# Patient Record
Sex: MALE | Race: WHITE | NOT HISPANIC OR LATINO | Employment: FULL TIME | URBAN - METROPOLITAN AREA
[De-identification: names, ages, dates, MRNs, and addresses within clinical notes are randomized per-mention and may not be internally consistent; named-entity substitution may affect disease eponyms.]

---

## 2017-01-03 RX ORDER — CITALOPRAM 20 MG/1
TABLET ORAL
Qty: 90 TAB | Refills: 0 | Status: SHIPPED | OUTPATIENT
Start: 2017-01-03 | End: 2017-05-06 | Stop reason: SDUPTHER

## 2017-01-03 NOTE — TELEPHONE ENCOUNTER
Was the patient seen in the last year in this department? Yes     Does patient have an active prescription for medications requested? No     Received Request Via: Pharmacy      Pt met protocol?: Yes    LAST OV 10/20/16

## 2017-03-07 RX ORDER — LISINOPRIL AND HYDROCHLOROTHIAZIDE 20; 12.5 MG/1; MG/1
TABLET ORAL
Qty: 180 TAB | Refills: 0 | Status: SHIPPED | OUTPATIENT
Start: 2017-03-07 | End: 2017-10-16

## 2017-03-07 NOTE — TELEPHONE ENCOUNTER
Was the patient seen in the last year in this department? Yes     Does patient have an active prescription for medications requested? No     Received Request Via: Pharmacy      Pt met protocol?: Yes, LABS 5/16, OV /88

## 2017-05-08 RX ORDER — CITALOPRAM 20 MG/1
TABLET ORAL
Qty: 90 TAB | Refills: 0 | Status: SHIPPED | OUTPATIENT
Start: 2017-05-08 | End: 2017-08-19 | Stop reason: SDUPTHER

## 2017-05-15 ENCOUNTER — OFFICE VISIT (OUTPATIENT)
Dept: URGENT CARE | Facility: PHYSICIAN GROUP | Age: 52
End: 2017-05-15
Payer: COMMERCIAL

## 2017-05-15 VITALS
DIASTOLIC BLOOD PRESSURE: 62 MMHG | TEMPERATURE: 97.5 F | RESPIRATION RATE: 18 BRPM | HEART RATE: 84 BPM | BODY MASS INDEX: 36.01 KG/M2 | SYSTOLIC BLOOD PRESSURE: 126 MMHG | WEIGHT: 230 LBS | OXYGEN SATURATION: 98 %

## 2017-05-15 DIAGNOSIS — J98.8 RTI (RESPIRATORY TRACT INFECTION): ICD-10-CM

## 2017-05-15 DIAGNOSIS — I10 ESSENTIAL HYPERTENSION: ICD-10-CM

## 2017-05-15 DIAGNOSIS — J01.90 ACUTE RHINOSINUSITIS: Primary | ICD-10-CM

## 2017-05-15 PROCEDURE — 99214 OFFICE O/P EST MOD 30 MIN: CPT | Performed by: FAMILY MEDICINE

## 2017-05-15 RX ORDER — FLUTICASONE PROPIONATE 50 MCG
2 SPRAY, SUSPENSION (ML) NASAL
Qty: 1 BOTTLE | Refills: 1 | Status: SHIPPED | OUTPATIENT
Start: 2017-05-15 | End: 2017-07-06

## 2017-05-15 RX ORDER — PREDNISONE 20 MG/1
40 TABLET ORAL EVERY MORNING
Qty: 12 TAB | Refills: 0 | Status: SHIPPED | OUTPATIENT
Start: 2017-05-15 | End: 2017-05-21

## 2017-05-15 RX ORDER — AMOXICILLIN AND CLAVULANATE POTASSIUM 875; 125 MG/1; MG/1
1 TABLET, FILM COATED ORAL 2 TIMES DAILY
Qty: 14 TAB | Refills: 0 | Status: SHIPPED | OUTPATIENT
Start: 2017-05-15 | End: 2017-05-22

## 2017-05-15 ASSESSMENT — ENCOUNTER SYMPTOMS
SPUTUM PRODUCTION: 1
FEVER: 0
COUGH: 1
DIZZINESS: 0
HEADACHES: 1
CHILLS: 0
FOCAL WEAKNESS: 0
SORE THROAT: 1

## 2017-05-15 NOTE — PROGRESS NOTES
Subjective:      Lawrence Garrett is a 52 y.o. male who presents with URI    Chief Complaint   Patient presents with   • URI     x 10 days        - This is a very pleasant 52 y.o. male with complaints of 10 days cough and sinus congestion w/ yellow DC      - Hx HTN, stable on current meds          ALLERGIES:  Review of patient's allergies indicates no known allergies.     PMH:  Past Medical History   Diagnosis Date   • Hypertension    • Hyperlipidemia    • Anxiety    • HTN (hypertension) 11/17/2010   • Elevated liver function tests 11/17/2010   • Dyslipidemia 11/17/2010        MEDS:    Current outpatient prescriptions:   •  amoxicillin-clavulanate (AUGMENTIN) 875-125 MG Tab, Take 1 Tab by mouth 2 times a day for 7 days., Disp: 14 Tab, Rfl: 0  •  predniSONE (DELTASONE) 20 MG Tab, Take 2 Tabs by mouth every morning for 6 days., Disp: 12 Tab, Rfl: 0  •  fluticasone (FLONASE) 50 MCG/ACT nasal spray, Spray 2 Sprays in nose every bedtime., Disp: 1 Bottle, Rfl: 1  •  Hydrocod Polst-CPM Polst ER (TUSSIONEX) 10-8 MG/5ML Suspension Extended Release, Take 5 mL by mouth every 12 hours as needed for Cough., Disp: 120 mL, Rfl: 0  •  citalopram (CELEXA) 20 MG Tab, TAKE 1 TABLET BY MOUTH EVERY DAY, Disp: 90 Tab, Rfl: 0  •  lisinopril-hydrochlorothiazide (PRINZIDE, ZESTORETIC) 20-12.5 MG per tablet, TAKE 1 TAB BY MOUTH 2 TIMES A DAY., Disp: 180 Tab, Rfl: 0  •  ibuprofen (MOTRIN) 800 MG Tab, Take 1 Tab by mouth every 8 hours as needed. For mild pain, Disp: 30 Tab, Rfl: 0  •  aspirin (ASA) 325 MG TABS, Take 325 mg by mouth every day., Disp: , Rfl:   •  docosahexanoic acid (FISH OIL) 1000 MG CAPS, Take 1,000 mg by mouth 2 Times a Day., Disp: , Rfl:   •  vitamin D (CHOLECALCIFEROL) 1000 UNIT TABS, Take 1,000 Units by mouth every day., Disp: , Rfl:   •  atorvastatin (LIPITOR) 40 MG Tab, Take 1 Tab by mouth every day., Disp: 90 Tab, Rfl: 3  •  clotrimazole-betamethasone (LOTRISONE) 1-0.05 % Cream, Apply 1 Application to affected area(s)  2 times a day., Disp: 1 Tube, Rfl: 3  •  meclizine (ANTIVERT) 25 MG TABS, Take 25 mg by mouth as needed., Disp: , Rfl:   •  fluticasone (FLONASE) 50 MCG/ACT nasal spray, Spray 2 Sprays in nose every day., Disp: 16 g, Rfl: 11  •  methocarbamol (ROBAXIN-750) 750 MG TABS, Take 1 Tab by mouth 3 times a day., Disp: 90 Tab, Rfl: 0    ** Past medical, social, family and surgical history documented in HPI or under PMH/PSH/FH section, otherwise it is contributory **             HPI    Review of Systems   Constitutional: Negative for fever and chills.   HENT: Positive for congestion and sore throat.    Respiratory: Positive for cough and sputum production.    Cardiovascular: Negative for chest pain.   Neurological: Positive for headaches. Negative for dizziness and focal weakness.          Objective:     /62 mmHg  Pulse 84  Temp(Src) 36.4 °C (97.5 °F)  Resp 18  Wt 104.327 kg (230 lb)  SpO2 98%     Physical Exam   Constitutional: He appears well-developed. No distress.   HENT:   Head: Normocephalic and atraumatic.   Mouth/Throat: Oropharynx is clear and moist.   Eyes: Conjunctivae are normal.   Neck: Neck supple.   Cardiovascular: Regular rhythm.    No murmur heard.  Pulmonary/Chest: Effort normal and breath sounds normal.   Neurological: He is alert. He exhibits normal muscle tone.   Skin: Skin is warm and dry.   Psychiatric: He has a normal mood and affect. Judgment normal.   Nursing note and vitals reviewed.  boggy nasal mucosa w/ yellow DC              Assessment/Plan:         1. Acute rhinosinusitis  amoxicillin-clavulanate (AUGMENTIN) 875-125 MG Tab    predniSONE (DELTASONE) 20 MG Tab    fluticasone (FLONASE) 50 MCG/ACT nasal spray   2. RTI (respiratory tract infection)  Hydrocod Polst-CPM Polst ER (TUSSIONEX) 10-8 MG/5ML Suspension Extended Release             Dx & d/c instructions discussed w/ patient and/or family members. Follow up w/ Prvt Dr or here in 3-4 days if not getting better, sooner if needed,  ER  if worse and UC/PCP unavailable.        Possible side effects (i.e. Rash, GI upset/constipation, sedation, elevation of BP or sugars) of any medications given discussed.

## 2017-05-15 NOTE — MR AVS SNAPSHOT
Lawrence Gillespie Gerry   5/15/2017 12:15 PM   Office Visit   MRN: [id number]    Department:  [facility]   Dept Phone:  [phone]    Description:  Male : 1965   Provider:  Barrington Morales M.D.           Reason for Visit     URI x 10 days      Allergies as of 5/15/2017     No Known Allergies      You were diagnosed with     Acute rhinosinusitis   [358212]  -  Primary     RTI (respiratory tract infection)   [996327]       Essential hypertension   [8796322]         Vital Signs     Blood Pressure Pulse Temperature Respirations Weight Oxygen Saturation    126/62 mmHg 84 36.4 °C (97.5 °F) 18 104.327 kg (230 lb) 98%    Smoking Status                   Never Smoker            Basic Information     Date Of Birth Sex Race Ethnicity Preferred Language    1965 Male White Non- English      Problem List              ICD-10-CM Priority Class Noted - Resolved    HTN (hypertension) I10   2010 - Present    Anxiety F41.9   2010 - Present    Dyslipidemia E78.5   2010 - Present    Palpitations R00.2   2014 - Present    Sea sickness T75.3XXA   2014 - Present    Rupture of right gastrocnemius tendon S86.811A   2015 - Present    Depression F32.9   2016 - Present      Health Maintenance        Date Due Completion Dates    COLONOSCOPY 2023 (Done)    Override on 2013: Done    IMM DTaP/Tdap/Td Vaccine (2 - Td) 2026            Current Immunizations     Influenza Vaccine Quad Inj (Pf) 2016    Tdap Vaccine 2016      Below and/or attached are the medications your provider expects you to take. Review all of your home medications and newly ordered medications with your provider and/or pharmacist. Follow medication instructions as directed by your provider and/or pharmacist. Please keep your medication list with you and share with your provider. Update the information when medications are discontinued, doses are changed, or new  medications (including over-the-counter products) are added; and carry medication information at all times in the event of emergency situations     Allergies:  No Known Allergies          Medications  Valid as of: May 15, 2017 - 12:35 PM    Generic Name Brand Name Tablet Size Instructions for use    Amoxicillin-Pot Clavulanate (Tab) AUGMENTIN 875-125 MG Take 1 Tab by mouth 2 times a day for 7 days.        Aspirin (Tab)  MG Take 325 mg by mouth every day.        Atorvastatin Calcium (Tab) LIPITOR 40 MG Take 1 Tab by mouth every day.        Cholecalciferol (Tab) cholecalciferol 1000 UNIT Take 1,000 Units by mouth every day.        Citalopram Hydrobromide (Tab) CELEXA 20 MG TAKE 1 TABLET BY MOUTH EVERY DAY        Clotrimazole-Betamethasone (Cream) LOTRISONE 1-0.05 % Apply 1 Application to affected area(s) 2 times a day.        Fluticasone Propionate (Suspension) FLONASE 50 MCG/ACT Spray 2 Sprays in nose every day.        Fluticasone Propionate (Suspension) FLONASE 50 MCG/ACT Spray 2 Sprays in nose every bedtime.        Hydrocod Polst-Chlorphen Polst (Suspension Extended Release) TUSSIONEX 10-8 MG/5ML Take 5 mL by mouth every 12 hours as needed for Cough.        Ibuprofen (Tab) MOTRIN 800 MG Take 1 Tab by mouth every 8 hours as needed. For mild pain        Lisinopril-Hydrochlorothiazide (Tab) PRINZIDE, ZESTORETIC 20-12.5 MG TAKE 1 TAB BY MOUTH 2 TIMES A DAY.        Meclizine HCl (Tab) ANTIVERT 25 MG Take 25 mg by mouth as needed.        Methocarbamol (Tab) ROBAXIN 750 MG Take 1 Tab by mouth 3 times a day.        Omega-3 Fatty Acids (Cap) OMEGA 3 FA 1000 MG Take 1,000 mg by mouth 2 Times a Day.        PredniSONE (Tab) DELTASONE 20 MG Take 2 Tabs by mouth every morning for 6 days.        .                 Medicines prescribed today were sent to:     [facility] #[id number] - [place], [place] - [address]    [address] [address]    Phone: [phone] Fax: [phone]    Open 24 Hours?: No       Medication refill instructions:       If your prescription bottle indicates you have medication refills left, it is not necessary to call your provider’s office. Please contact your pharmacy and they will refill your medication.    If your prescription bottle indicates you do not have any refills left, you may request refills at any time through one of the following ways: The online [employer] [facility] system (except Urgent Care), by calling your provider’s office, or by asking your pharmacy to contact your provider’s office with a refill request. Medication refills are processed only during regular business hours and may not be available until the next business day. Your provider may request additional information or to have a follow-up visit with you prior to refilling your medication.   *Please Note: Medication refills are assigned a new Rx number when refilled electronically. Your pharmacy may indicate that no refills were authorized even though a new prescription for the same medication is available at the pharmacy. Please request the medicine by name with the pharmacy before contacting your provider for a refill.           [employer] [facility] Access Code: Activation code not generated  Current [employer] Status: Active

## 2017-05-25 ENCOUNTER — OFFICE VISIT (OUTPATIENT)
Dept: URGENT CARE | Facility: PHYSICIAN GROUP | Age: 52
End: 2017-05-25
Payer: COMMERCIAL

## 2017-05-25 ENCOUNTER — HOSPITAL ENCOUNTER (OUTPATIENT)
Dept: RADIOLOGY | Facility: MEDICAL CENTER | Age: 52
End: 2017-05-25
Attending: PHYSICIAN ASSISTANT
Payer: COMMERCIAL

## 2017-05-25 VITALS
BODY MASS INDEX: 34.86 KG/M2 | TEMPERATURE: 100 F | HEIGHT: 68 IN | RESPIRATION RATE: 18 BRPM | DIASTOLIC BLOOD PRESSURE: 98 MMHG | WEIGHT: 230 LBS | SYSTOLIC BLOOD PRESSURE: 138 MMHG | HEART RATE: 97 BPM | OXYGEN SATURATION: 92 %

## 2017-05-25 DIAGNOSIS — R05.9 COUGH: ICD-10-CM

## 2017-05-25 DIAGNOSIS — J18.9 PNEUMONIA OF RIGHT LOWER LOBE DUE TO INFECTIOUS ORGANISM: ICD-10-CM

## 2017-05-25 LAB
FLUAV+FLUBV AG SPEC QL IA: NORMAL
INT CON NEG: NEGATIVE
INT CON POS: POSITIVE

## 2017-05-25 PROCEDURE — 87804 INFLUENZA ASSAY W/OPTIC: CPT | Performed by: PHYSICIAN ASSISTANT

## 2017-05-25 PROCEDURE — 71020 DX-CHEST-2 VIEWS: CPT

## 2017-05-25 PROCEDURE — 99214 OFFICE O/P EST MOD 30 MIN: CPT | Mod: 25 | Performed by: PHYSICIAN ASSISTANT

## 2017-05-25 RX ORDER — DOXYCYCLINE HYCLATE 100 MG
100 TABLET ORAL 2 TIMES DAILY
Qty: 20 TAB | Refills: 0 | Status: SHIPPED | OUTPATIENT
Start: 2017-05-25 | End: 2017-06-04

## 2017-05-25 RX ORDER — CEFTRIAXONE 1 G/1
1 INJECTION, POWDER, FOR SOLUTION INTRAMUSCULAR; INTRAVENOUS ONCE
Status: COMPLETED | OUTPATIENT
Start: 2017-05-25 | End: 2017-05-25

## 2017-05-25 RX ADMIN — CEFTRIAXONE 1 G: 1 INJECTION, POWDER, FOR SOLUTION INTRAMUSCULAR; INTRAVENOUS at 20:44

## 2017-05-25 ASSESSMENT — ENCOUNTER SYMPTOMS
ABDOMINAL PAIN: 0
FOCAL WEAKNESS: 0
COUGH: 1
HEADACHES: 0
VOMITING: 0
SORE THROAT: 0
MYALGIAS: 0
WHEEZING: 1
FEVER: 1
RHINORRHEA: 1
CHILLS: 1
TINGLING: 0
DIARRHEA: 0
NAUSEA: 0
DIZZINESS: 0
SPUTUM PRODUCTION: 1
SWEATS: 1
SHORTNESS OF BREATH: 0
SENSORY CHANGE: 0
HEMOPTYSIS: 0

## 2017-05-25 ASSESSMENT — COPD QUESTIONNAIRES: COPD: 0

## 2017-05-25 NOTE — MR AVS SNAPSHOT
"Shahzadjulito Gillespie Gerry   2017 6:30 PM   Office Visit   MRN: [id number]    Department:  [facility]   Dept Phone:  [phone]    Description:  Male : 1965   Provider:  Sarah Valdez PA-C           Reason for Visit     Cold Symptoms cough, congestion x3 weeks, not getting better      Allergies as of 2017     No Known Allergies      You were diagnosed with     Pneumonia of right lower lobe due to infectious organism   [[id number]]         Vital Signs     Blood Pressure Pulse Temperature Respirations Height Weight    138/98 mmHg 97 37.8 °C (100 °F) 18 1.727 m (5' 8\") 104.327 kg (230 lb)    Body Mass Index Oxygen Saturation Smoking Status             34.98 kg/m2 92% Never Smoker          Basic Information     Date Of Birth Sex Race Ethnicity Preferred Language    1965 Male White Non- English      Problem List              ICD-10-CM Priority Class Noted - Resolved    HTN (hypertension) I10   2010 - Present    Anxiety F41.9   2010 - Present    Dyslipidemia E78.5   2010 - Present    Palpitations R00.2   2014 - Present    Sea sickness T75.3XXA   2014 - Present    Rupture of right gastrocnemius tendon S86.811A   2015 - Present    Depression F32.9   2016 - Present      Health Maintenance        Date Due Completion Dates    COLONOSCOPY 2023 (Done)    Override on 2013: Done    IMM DTaP/Tdap/Td Vaccine (2 - Td) 2026            Results     POCT Influenza A/B      Component    Rapid Influenza A-B    neg    Internal Control Positive    Positive    Internal Control Negative    Negative                        Current Immunizations     Influenza Vaccine Quad Inj (Pf) 2016    Tdap Vaccine 2016      Below and/or attached are the medications your provider expects you to take. Review all of your home medications and newly ordered medications with your provider and/or pharmacist. Follow medication instructions as " directed by your provider and/or pharmacist. Please keep your medication list with you and share with your provider. Update the information when medications are discontinued, doses are changed, or new medications (including over-the-counter products) are added; and carry medication information at all times in the event of emergency situations     Allergies:  No Known Allergies          Medications  Valid as of: May 25, 2017 -  8:57 PM    Generic Name Brand Name Tablet Size Instructions for use    Aspirin (Tab)  MG Take 325 mg by mouth every day.        Atorvastatin Calcium (Tab) LIPITOR 40 MG Take 1 Tab by mouth every day.        Cholecalciferol (Tab) cholecalciferol 1000 UNIT Take 1,000 Units by mouth every day.        Citalopram Hydrobromide (Tab) CELEXA 20 MG TAKE 1 TABLET BY MOUTH EVERY DAY        Clotrimazole-Betamethasone (Cream) LOTRISONE 1-0.05 % Apply 1 Application to affected area(s) 2 times a day.        Doxycycline Hyclate (Tab) VIBRAMYCIN 100 MG Take 1 Tab by mouth 2 times a day for 10 days.        Fluticasone Propionate (Suspension) FLONASE 50 MCG/ACT Spray 2 Sprays in nose every day.        Fluticasone Propionate (Suspension) FLONASE 50 MCG/ACT Spray 2 Sprays in nose every bedtime.        Hydrocod Polst-Chlorphen Polst (Suspension Extended Release) TUSSIONEX 10-8 MG/5ML Take 5 mL by mouth every 12 hours as needed for Cough.        Ibuprofen (Tab) MOTRIN 800 MG Take 1 Tab by mouth every 8 hours as needed. For mild pain        Lisinopril-Hydrochlorothiazide (Tab) PRINZIDE, ZESTORETIC 20-12.5 MG TAKE 1 TAB BY MOUTH 2 TIMES A DAY.        Meclizine HCl (Tab) ANTIVERT 25 MG Take 25 mg by mouth as needed.        Methocarbamol (Tab) ROBAXIN 750 MG Take 1 Tab by mouth 3 times a day.        Omega-3 Fatty Acids (Cap) OMEGA 3 FA 1000 MG Take 1,000 mg by mouth 2 Times a Day.        .                 Medicines prescribed today were sent to:     [facility]Y #[id number] - [place], [place] - [address]    [address] [address]    Phone: [phone] Fax: [phone]    Open 24 Hours?: No    [id number] - [place] - [address]    [address] [place] [address] [place] [address]    Phone: [phone] Fax: [phone]    Open 24 Hours?: No      Medication refill instructions:       If your prescription bottle indicates you have medication refills left, it is not necessary to call your provider’s office. Please contact your pharmacy and they will refill your medication.    If your prescription bottle indicates you do not have any refills left, you may request refills at any time through one of the following ways: The online [employer] system (except Urgent Care), by calling your provider’s office, or by asking your pharmacy to contact your provider’s office with a refill request. Medication refills are processed only during regular business hours and may not be available until the next business day. Your provider may request additional information or to have a follow-up visit with you prior to refilling your medication.   *Please Note: Medication refills are assigned a new Rx number when refilled electronically. Your pharmacy may indicate that no refills were authorized even though a new prescription for the same medication is available at the pharmacy. Please request the medicine by name with the pharmacy before contacting your provider for a refill.        Your To Do List     Future Labs/Procedures Complete By Expires    DX-CHEST-2 VIEWS  As directed 5/25/2018         [employer] Access Code: Activation code not generated  Current [employer] Status: Active

## 2017-05-26 NOTE — PROGRESS NOTES
"Subjective:      Lawrence Garrett is a 52 y.o. male who presents with Cold Symptoms            Cough  This is a new problem. Episode onset: 3 weeks  The problem has been unchanged. The cough is productive of sputum. Associated symptoms include chills, ear congestion, a fever, nasal congestion, rhinorrhea, sweats and wheezing. Pertinent negatives include no chest pain, ear pain, headaches, hemoptysis, myalgias, rash, sore throat or shortness of breath. Associated symptoms comments: Sinus pressure . He has tried prescription cough suppressant (Augmentin ) for the symptoms. The treatment provided no relief. There is no history of asthma, bronchitis, COPD or pneumonia.     Past Medical History   Diagnosis Date   • Hypertension    • Hyperlipidemia    • Anxiety    • HTN (hypertension) 11/17/2010   • Elevated liver function tests 11/17/2010   • Dyslipidemia 11/17/2010       Past Surgical History   Procedure Laterality Date   • Vasectomy         Family History   Problem Relation Age of Onset   • Heart Disease Father 70     CHF       No Known Allergies    Medications, Allergies, and current problem list reviewed today in Epic      Review of Systems   Constitutional: Positive for fever, chills and malaise/fatigue.   HENT: Positive for congestion and rhinorrhea. Negative for ear discharge, ear pain and sore throat.         Sinus pressure and rhinorrhea    Respiratory: Positive for cough, sputum production and wheezing. Negative for hemoptysis and shortness of breath.    Cardiovascular: Negative for chest pain and leg swelling.   Gastrointestinal: Negative for nausea, vomiting, abdominal pain and diarrhea.   Musculoskeletal: Negative for myalgias.   Skin: Negative for rash.   Neurological: Negative for dizziness, tingling, sensory change, focal weakness and headaches.     All other systems reviewed and are negative.        Objective:     /98 mmHg  Pulse 97  Temp(Src) 37.8 °C (100 °F)  Resp 18  Ht 1.727 m (5' 8\")  " Wt 104.327 kg (230 lb)  BMI 34.98 kg/m2  SpO2 92%     Physical Exam   Constitutional: He is oriented to person, place, and time. He appears well-developed and well-nourished. No distress.   HENT:   Head: Normocephalic and atraumatic.   Right Ear: Tympanic membrane, external ear and ear canal normal.   Left Ear: Tympanic membrane, external ear and ear canal normal.   Nose: Mucosal edema and rhinorrhea present.   Mouth/Throat: Uvula is midline and oropharynx is clear and moist. No oropharyngeal exudate or posterior oropharyngeal erythema.   Eyes: Conjunctivae are normal.   Neck: Neck supple.   Cardiovascular: Normal rate, regular rhythm and normal heart sounds.  Exam reveals no gallop and no friction rub.    No murmur heard.  Lymphadenopathy:     He has no cervical adenopathy.   Neurological: He is alert and oriented to person, place, and time. No cranial nerve deficit.   Skin: Skin is warm and dry. No rash noted.   Psychiatric: He has a normal mood and affect. His behavior is normal. Judgment and thought content normal.     5/25/2017 7:54 PM    HISTORY/REASON FOR EXAM: Cough.    TECHNIQUE/EXAM DESCRIPTION AND NUMBER OF VIEWS:  Two views of the chest.    COMPARISON:  None.    FINDINGS:    Cardiomediastinal contours are normal.    There is mild patchy right basilar opacity    No pleural space process is evident.         Impression        Mild patchy right basilar opacity could represent pneumonia               Assessment/Plan:     1. Pneumonia of right lower lobe due to infectious organism  DX-CHEST-2 VIEWS    POCT Influenza A/B- negative    doxycycline (VIBRAMYCIN) 100 MG Tab    cefTRIAXone (ROCEPHIN) injection 1 g       Current outpatient prescriptions:   •  doxycycline (VIBRAMYCIN) 100 MG Tab, Take 1 Tab by mouth 2 times a day for 10 days., Disp: 20 Tab, Rfl: 0    Current facility-administered medications:   •  cefTRIAXone (ROCEPHIN) injection 1 g, 1 g, Intramuscular, Once, KEVIN Brennan  fluids, rest, humidification.     Differential diagnoses, Supportive care, and indications for immediate follow-up discussed with patient.   Instructed to return to clinic or nearest emergency department for any change in condition, further concerns, or worsening of symptoms.    The patient demonstrated a good understanding and agreed with the treatment plan.    Sarah Valdez PA-C

## 2017-05-31 ENCOUNTER — OFFICE VISIT (OUTPATIENT)
Dept: MEDICAL GROUP | Facility: PHYSICIAN GROUP | Age: 52
End: 2017-05-31
Payer: COMMERCIAL

## 2017-05-31 VITALS
WEIGHT: 233 LBS | HEIGHT: 68 IN | RESPIRATION RATE: 18 BRPM | DIASTOLIC BLOOD PRESSURE: 90 MMHG | TEMPERATURE: 97.8 F | BODY MASS INDEX: 35.31 KG/M2 | OXYGEN SATURATION: 98 % | SYSTOLIC BLOOD PRESSURE: 130 MMHG | HEART RATE: 80 BPM

## 2017-05-31 DIAGNOSIS — E66.9 OBESITY (BMI 30-39.9): ICD-10-CM

## 2017-05-31 DIAGNOSIS — E78.2 MIXED HYPERTRIGLYCERIDEMIA: ICD-10-CM

## 2017-05-31 DIAGNOSIS — J18.9 CAP (COMMUNITY ACQUIRED PNEUMONIA): Primary | ICD-10-CM

## 2017-05-31 DIAGNOSIS — E78.5 DYSLIPIDEMIA: ICD-10-CM

## 2017-05-31 DIAGNOSIS — Z12.5 SCREENING FOR PROSTATE CANCER: ICD-10-CM

## 2017-05-31 PROCEDURE — 99213 OFFICE O/P EST LOW 20 MIN: CPT | Performed by: NURSE PRACTITIONER

## 2017-05-31 ASSESSMENT — PATIENT HEALTH QUESTIONNAIRE - PHQ9: CLINICAL INTERPRETATION OF PHQ2 SCORE: 0

## 2017-05-31 NOTE — MR AVS SNAPSHOT
"Shahzadmund Jose Miguel Garrett   2017 2:20 PM   Office Visit   MRN: [id number]    Department:  [facility]   Dept Phone:  [phone]    Description:  Male : 1965   Provider:  YOSELIN Thompson           Reason for Visit     Follow-Up Pneumonia       Allergies as of 2017     No Known Allergies      You were diagnosed with     CAP (community acquired pneumonia)   [379271]  -  Primary     Mixed hypertriglyceridemia   [782876]       Dyslipidemia   [527227]       Screening for prostate cancer   [315931]         Vital Signs     Blood Pressure Pulse Temperature Respirations Height Weight    130/90 mmHg 80 36.6 °C (97.8 °F) 18 1.727 m (5' 8\") 105.688 kg (233 lb)    Body Mass Index Oxygen Saturation Smoking Status             35.44 kg/m2 98% Never Smoker          Basic Information     Date Of Birth Sex Race Ethnicity Preferred Language    1965 Male White Non- English      Your appointments     2017  8:20 AM   ANNUAL EXAM PREVENTATIVE with Loraine Hendrix M.D.   [address] [address]   [phone]              Problem List              ICD-10-CM Priority Class Noted - Resolved    HTN (hypertension) I10   2010 - Present    Anxiety F41.9   2010 - Present    Dyslipidemia E78.5   2010 - Present    Palpitations R00.2   2014 - Present    Sea sickness T75.3XXA   2014 - Present    Rupture of right gastrocnemius tendon S86.811A   2015 - Present    Depression F32.9   2016 - Present      Health Maintenance        Date Due Completion Dates    COLONOSCOPY 2023 (Done)    Override on 2013: Done    IMM DTaP/Tdap/Td Vaccine (2 - Td) 2026            Current Immunizations     Influenza Vaccine Quad Inj (Pf) 2016    Tdap Vaccine 2016      Below and/or attached are the medications your provider expects you to take. Review all of your home medications " and newly ordered medications with your provider and/or pharmacist. Follow medication instructions as directed by your provider and/or pharmacist. Please keep your medication list with you and share with your provider. Update the information when medications are discontinued, doses are changed, or new medications (including over-the-counter products) are added; and carry medication information at all times in the event of emergency situations     Allergies:  No Known Allergies          Medications  Valid as of: May 31, 2017 -  2:28 PM    Generic Name Brand Name Tablet Size Instructions for use    Aspirin (Tab)  MG Take 325 mg by mouth every day.        Atorvastatin Calcium (Tab) LIPITOR 40 MG Take 1 Tab by mouth every day.        Cholecalciferol (Tab) cholecalciferol 1000 UNIT Take 1,000 Units by mouth every day.        Citalopram Hydrobromide (Tab) CELEXA 20 MG TAKE 1 TABLET BY MOUTH EVERY DAY        Clotrimazole-Betamethasone (Cream) LOTRISONE 1-0.05 % Apply 1 Application to affected area(s) 2 times a day.        Doxycycline Hyclate (Tab) VIBRAMYCIN 100 MG Take 1 Tab by mouth 2 times a day for 10 days.        Fluticasone Propionate (Suspension) FLONASE 50 MCG/ACT Spray 2 Sprays in nose every day.        Fluticasone Propionate (Suspension) FLONASE 50 MCG/ACT Spray 2 Sprays in nose every bedtime.        Hydrocod Polst-Chlorphen Polst (Suspension Extended Release) TUSSIONEX 10-8 MG/5ML Take 5 mL by mouth every 12 hours as needed for Cough.        Ibuprofen (Tab) MOTRIN 800 MG Take 1 Tab by mouth every 8 hours as needed. For mild pain        Lisinopril-Hydrochlorothiazide (Tab) PRINZIDE, ZESTORETIC 20-12.5 MG TAKE 1 TAB BY MOUTH 2 TIMES A DAY.        Meclizine HCl (Tab) ANTIVERT 25 MG Take 25 mg by mouth as needed.        Methocarbamol (Tab) ROBAXIN 750 MG Take 1 Tab by mouth 3 times a day.        Omega-3 Fatty Acids (Cap) OMEGA 3 FA 1000 MG Take 1,000 mg by mouth 2 Times a Day.        .                    Medicines prescribed today were sent to:     [id number] - [place], [place] - [address]    [address] [place] [address]    Phone: [phone] Fax: [phone]    Open 24 Hours?: No    [id number] - [place], [place] - [address]    [address] [place] [address] [place] [address]    Phone: [phone] Fax: [phone]    Open 24 Hours?: No      Medication refill instructions:       If your prescription bottle indicates you have medication refills left, it is not necessary to call your provider’s office. Please contact your pharmacy and they will refill your medication.    If your prescription bottle indicates you do not have any refills left, you may request refills at any time through one of the following ways: The online [employer] system (except Urgent Care), by calling your provider’s office, or by asking your pharmacy to contact your provider’s office with a refill request. Medication refills are processed only during regular business hours and may not be available until the next business day. Your provider may request additional information or to have a follow-up visit with you prior to refilling your medication.   *Please Note: Medication refills are assigned a new Rx number when refilled electronically. Your pharmacy may indicate that no refills were authorized even though a new prescription for the same medication is available at the pharmacy. Please request the medicine by name with the pharmacy before contacting your provider for a refill.        Your To Do List     Future Labs/Procedures Complete By Expires    COMP METABOLIC PANEL  As directed 5/31/2018    HEMOGLOBIN A1C  As directed 5/31/2018    LIPID PROFILE  As directed 5/31/2018    PROSTATE SPECIFIC AG SCREENING  As directed 6/1/2018         [employer] Access Code: Activation code not generated  Current [employer] Status: Active

## 2017-05-31 NOTE — PROGRESS NOTES
Chief Complaint   Patient presents with   • Follow-Up     Pneumonia        HISTORY OF PRESENT ILLNESS: Patient is a 52 y.o. male established patient who presents today to discuss the followin. CAP (community acquired pneumonia)  Patient was seen in the  on 17, diagnosed with pneumonia.  Confirmed by chest xray.  He has been taking Doxycycline as prescribed with significant improvement in symptoms.  Continues to report cough with occasional wheezing.  No further fever.  Back to work.    2. Mixed hypertriglyceridemia  3. Dyslipidemia  Patient continues to take atorvastatin daily.  He denies any side effects.  He is preparing for a well exam with his primary care provider and is requesting lab orders to have done prior to the appointment.    4. Screening for prostate cancer    5. Obesity (BMI 30-39.9)    Allergies:Review of patient's allergies indicates no known allergies.    Current Outpatient Prescriptions Ordered in [facility]   Medication Sig Dispense Refill   • doxycycline (VIBRAMYCIN) 100 MG Tab Take 1 Tab by mouth 2 times a day for 10 days. 20 Tab 0   • Hydrocod Polst-CPM Polst ER (TUSSIONEX) 10-8 MG/5ML Suspension Extended Release Take 5 mL by mouth every 12 hours as needed for Cough. 120 mL 0   • citalopram (CELEXA) 20 MG Tab TAKE 1 TABLET BY MOUTH EVERY DAY 90 Tab 0   • lisinopril-hydrochlorothiazide (PRINZIDE, ZESTORETIC) 20-12.5 MG per tablet TAKE 1 TAB BY MOUTH 2 TIMES A DAY. 180 Tab 0   • ibuprofen (MOTRIN) 800 MG Tab Take 1 Tab by mouth every 8 hours as needed. For mild pain 30 Tab 0   • aspirin (ASA) 325 MG TABS Take 325 mg by mouth every day.     • docosahexanoic acid (FISH OIL) 1000 MG CAPS Take 1,000 mg by mouth 2 Times a Day.     • vitamin D (CHOLECALCIFEROL) 1000 UNIT TABS Take 1,000 Units by mouth every day.     • fluticasone (FLONASE) 50 MCG/ACT nasal spray Spray 2 Sprays in nose every bedtime. 1 Bottle 1   • atorvastatin (LIPITOR) 40 MG Tab Take 1 Tab by mouth every day. 90 Tab 3   •  clotrimazole-betamethasone (LOTRISONE) 1-0.05 % Cream Apply 1 Application to affected area(s) 2 times a day. 1 Tube 3   • meclizine (ANTIVERT) 25 MG TABS Take 25 mg by mouth as needed.     • fluticasone (FLONASE) 50 MCG/ACT nasal spray Spray 2 Sprays in nose every day. 16 g 11   • methocarbamol (ROBAXIN-750) 750 MG TABS Take 1 Tab by mouth 3 times a day. 90 Tab 0     No current Epic-ordered facility-administered medications on file.       Past Medical History   Diagnosis Date   • Hypertension    • Hyperlipidemia    • Anxiety    • HTN (hypertension) 2010   • Elevated liver function tests 2010   • Dyslipidemia 2010       Social History   Substance Use Topics   • Smoking status: Never Smoker    • Smokeless tobacco: Never Used   • Alcohol Use: 0.0 oz/week     0 Standard drinks or equivalent per week      Comment: rare       Family Status   Relation Status Death Age   • Mother Alive    • Father     • Sister Alive    • Brother  21     MVA   • Maternal Grandmother     • Maternal Grandfather     • Paternal Grandmother     • Paternal Grandfather     • Sister Alive      Family History   Problem Relation Age of Onset   • Heart Disease Father 70     CHF       ROS: see above    Review of Systems   Constitutional: Negative for fever, chills, weight loss and malaise/fatigue.   HENT: Negative for ear pain, nosebleeds, congestion, sore throat and neck pain.    Eyes: Negative for blurred vision.   Respiratory: Negative for cough, sputum production, shortness of breath and wheezing.    Cardiovascular: Negative for chest pain, palpitations, orthopnea and leg swelling.   Gastrointestinal: Negative for heartburn, nausea, vomiting and abdominal pain.   Genitourinary: Negative for dysuria, urgency and frequency.   Musculoskeletal: Negative for myalgias, back pain and joint pain.   Skin: Negative for rash and itching.   Neurological: Negative for dizziness, tingling, tremors,  "sensory change, focal weakness and headaches.   Endo/Heme/Allergies: Does not bruise/bleed easily.   Psychiatric/Behavioral: Negative for depression, suicidal ideas and memory loss.  The patient is not nervous/anxious and does not have insomnia.        Exam:  Blood pressure 130/90, pulse 80, temperature 36.6 °C (97.8 °F), resp. rate 18, height 1.727 m (5' 8\"), weight 105.688 kg (233 lb), SpO2 98 %.  General:  Well nourished, well developed male in NAD  Head is grossly normal.  Neck: Thyroid is not enlarged.  Pulmonary: Normal effort. Bibasilar rales cleared with coughing.  Cardiovascular: Regular rate and rhythm without murmur.   Extremities: no clubbing, cyanosis, or edema.  Psych:  Mood and affect are normal.  Answering questions appropriately with good eye contact.      Please note that this dictation was created using voice recognition software. I have made every reasonable attempt to correct obvious errors, but I expect that there are errors of grammar and possibly content that I did not discover before finalizing the note.    Assessment/Plan:    1. CAP (community acquired pneumonia)     2. Mixed hypertriglyceridemia  LIPID PROFILE   3. Dyslipidemia  LIPID PROFILE    COMP METABOLIC PANEL    HEMOGLOBIN A1C   4. Screening for prostate cancer  PROSTATE SPECIFIC AG SCREENING   5. Obesity (BMI 30-39.9)  Patient identified as having weight management issue.  Appropriate orders and counseling given.        1.  No changes to medication treatment, complete course of Doxycycline as prescribed  2.  Fasting lab orders given  3.  F/u with Dr. Hendrix as scheduled.    "

## 2017-06-20 RX ORDER — IBUPROFEN 800 MG/1
TABLET ORAL
Qty: 30 TAB | Refills: 2 | Status: SHIPPED | OUTPATIENT
Start: 2017-06-20 | End: 2017-07-06

## 2017-06-20 RX ORDER — LISINOPRIL AND HYDROCHLOROTHIAZIDE 20; 12.5 MG/1; MG/1
TABLET ORAL
Qty: 180 TAB | Refills: 0 | Status: SHIPPED | OUTPATIENT
Start: 2017-06-20 | End: 2017-07-06

## 2017-06-20 NOTE — TELEPHONE ENCOUNTER
Was the patient seen in the last year in this department? Yes     Does patient have an active prescription for medications requested? No     Received Request Via: Pharmacy      Pt met protocol?: Yes, OV last month   BP Readings from Last 1 Encounters:   05/31/17 130/90

## 2017-06-29 ENCOUNTER — HOSPITAL ENCOUNTER (OUTPATIENT)
Dept: LAB | Facility: MEDICAL CENTER | Age: 52
End: 2017-06-29
Attending: NURSE PRACTITIONER
Payer: COMMERCIAL

## 2017-06-29 DIAGNOSIS — E78.2 MIXED HYPERTRIGLYCERIDEMIA: ICD-10-CM

## 2017-06-29 DIAGNOSIS — E78.5 DYSLIPIDEMIA: ICD-10-CM

## 2017-06-29 DIAGNOSIS — Z12.5 SCREENING FOR PROSTATE CANCER: ICD-10-CM

## 2017-06-29 LAB
ALBUMIN SERPL BCP-MCNC: 4.4 G/DL (ref 3.2–4.9)
ALBUMIN/GLOB SERPL: 1.4 G/DL
ALP SERPL-CCNC: 82 U/L (ref 30–99)
ALT SERPL-CCNC: 30 U/L (ref 2–50)
ANION GAP SERPL CALC-SCNC: 6 MMOL/L (ref 0–11.9)
AST SERPL-CCNC: 20 U/L (ref 12–45)
BILIRUB SERPL-MCNC: 1 MG/DL (ref 0.1–1.5)
BUN SERPL-MCNC: 13 MG/DL (ref 8–22)
CALCIUM SERPL-MCNC: 9.7 MG/DL (ref 8.5–10.5)
CHLORIDE SERPL-SCNC: 99 MMOL/L (ref 96–112)
CHOLEST SERPL-MCNC: 235 MG/DL (ref 100–199)
CO2 SERPL-SCNC: 30 MMOL/L (ref 20–33)
CREAT SERPL-MCNC: 1.04 MG/DL (ref 0.5–1.4)
EST. AVERAGE GLUCOSE BLD GHB EST-MCNC: 117 MG/DL
GFR SERPL CREATININE-BSD FRML MDRD: >60 ML/MIN/1.73 M 2
GLOBULIN SER CALC-MCNC: 3.1 G/DL (ref 1.9–3.5)
GLUCOSE SERPL-MCNC: 89 MG/DL (ref 65–99)
HBA1C MFR BLD: 5.7 % (ref 0–5.6)
HDLC SERPL-MCNC: 50 MG/DL
LDLC SERPL CALC-MCNC: 124 MG/DL
POTASSIUM SERPL-SCNC: 4.1 MMOL/L (ref 3.6–5.5)
PROT SERPL-MCNC: 7.5 G/DL (ref 6–8.2)
PSA SERPL-MCNC: 1.47 NG/ML (ref 0–4)
SODIUM SERPL-SCNC: 135 MMOL/L (ref 135–145)
TRIGL SERPL-MCNC: 303 MG/DL (ref 0–149)

## 2017-06-29 PROCEDURE — 80053 COMPREHEN METABOLIC PANEL: CPT

## 2017-06-29 PROCEDURE — 80061 LIPID PANEL: CPT

## 2017-06-29 PROCEDURE — 84153 ASSAY OF PSA TOTAL: CPT

## 2017-06-29 PROCEDURE — 36415 COLL VENOUS BLD VENIPUNCTURE: CPT

## 2017-06-29 PROCEDURE — 83036 HEMOGLOBIN GLYCOSYLATED A1C: CPT

## 2017-07-06 ENCOUNTER — OFFICE VISIT (OUTPATIENT)
Dept: MEDICAL GROUP | Facility: PHYSICIAN GROUP | Age: 52
End: 2017-07-06
Payer: COMMERCIAL

## 2017-07-06 VITALS
SYSTOLIC BLOOD PRESSURE: 132 MMHG | HEART RATE: 72 BPM | RESPIRATION RATE: 14 BRPM | BODY MASS INDEX: 36.22 KG/M2 | DIASTOLIC BLOOD PRESSURE: 86 MMHG | HEIGHT: 68 IN | TEMPERATURE: 97.9 F | OXYGEN SATURATION: 97 % | WEIGHT: 239 LBS

## 2017-07-06 DIAGNOSIS — R73.02 IMPAIRED GLUCOSE TOLERANCE: ICD-10-CM

## 2017-07-06 DIAGNOSIS — Z00.00 WELL ADULT EXAM: ICD-10-CM

## 2017-07-06 DIAGNOSIS — E78.2 MIXED HYPERLIPIDEMIA: ICD-10-CM

## 2017-07-06 PROCEDURE — 99396 PREV VISIT EST AGE 40-64: CPT | Performed by: FAMILY MEDICINE

## 2017-07-06 NOTE — MR AVS SNAPSHOT
"        Lawrence Gillespie Gerry   2017 9:40 AM   Office Visit   MRN: [id number]    Department:  [facility]   Dept Phone:  [phone]    Description:  Male : 1965   Provider:  Loraine Hendrix M.D.           Reason for Visit     Annual Exam     Follow-Up labs      Allergies as of 2017     No Known Allergies      You were diagnosed with     Well adult exam   [659279]       Impaired glucose tolerance   [976087]       Mixed hyperlipidemia   [272.2.ICD-9-CM]         Vital Signs     Blood Pressure Pulse Temperature Respirations Height Weight    132/86 mmHg 72 36.6 °C (97.9 °F) 14 1.727 m (5' 8\") 108.41 kg (239 lb)    Body Mass Index Oxygen Saturation Smoking Status             36.35 kg/m2 97% Never Smoker          Basic Information     Date Of Birth Sex Race Ethnicity Preferred Language    1965 Male White Non- English      Problem List              ICD-10-CM Priority Class Noted - Resolved    HTN (hypertension) I10   2010 - Present    Anxiety F41.9   2010 - Present    Dyslipidemia E78.5   2010 - Present    Palpitations R00.2   2014 - Present    Sea sickness T75.3XXA   2014 - Present    Rupture of right gastrocnemius tendon S86.811A   2015 - Present    Depression F32.9   2016 - Present    Obesity (BMI 30-39.9) E66.9   2017 - Present      Health Maintenance        Date Due Completion Dates    IMM INFLUENZA (1) 2017    COLONOSCOPY 2023 (Done)    Override on 2013: Done    IMM DTaP/Tdap/Td Vaccine (2 - Td) 2026            Current Immunizations     Influenza Vaccine Quad Inj (Pf) 2016    Tdap Vaccine 2016      Below and/or attached are the medications your provider expects you to take. Review all of your home medications and newly ordered medications with your provider and/or pharmacist. Follow medication instructions as directed by your provider and/or pharmacist. Please keep your medication list " with you and share with your provider. Update the information when medications are discontinued, doses are changed, or new medications (including over-the-counter products) are added; and carry medication information at all times in the event of emergency situations     Allergies:  No Known Allergies          Medications  Valid as of: July 06, 2017 - 10:13 AM    Generic Name Brand Name Tablet Size Instructions for use    Aspirin (Tab)  MG Take 325 mg by mouth every day.        Atorvastatin Calcium (Tab) LIPITOR 40 MG Take 1 Tab by mouth every day.        Cholecalciferol (Tab) cholecalciferol 1000 UNIT Take 1,000 Units by mouth every day.        Citalopram Hydrobromide (Tab) CELEXA 20 MG TAKE 1 TABLET BY MOUTH EVERY DAY        Lisinopril-Hydrochlorothiazide (Tab) PRINZIDE, ZESTORETIC 20-12.5 MG TAKE 1 TAB BY MOUTH 2 TIMES A DAY.        Meclizine HCl (Tab) ANTIVERT 25 MG Take 25 mg by mouth as needed.        Methocarbamol (Tab) ROBAXIN 750 MG Take 1 Tab by mouth 3 times a day.        Omega-3 Fatty Acids (Cap) OMEGA 3 FA 1000 MG Take 1,000 mg by mouth 2 Times a Day.        .                 Medicines prescribed today were sent to:     [id number] - [place], [place] - [address]    [address] [facility] [address]    Phone: [phone] Fax: [phone]    Open 24 Hours?: No      Medication refill instructions:       If your prescription bottle indicates you have medication refills left, it is not necessary to call your provider’s office. Please contact your pharmacy and they will refill your medication.    If your prescription bottle indicates you do not have any refills left, you may request refills at any time through one of the following ways: The online [employer] system (except Urgent Care), by calling your provider’s office, or by asking your pharmacy to contact your provider’s office with a refill request. Medication refills are processed only during regular business hours and may not  be available until the next business day. Your provider may request additional information or to have a follow-up visit with you prior to refilling your medication.   *Please Note: Medication refills are assigned a new Rx number when refilled electronically. Your pharmacy may indicate that no refills were authorized even though a new prescription for the same medication is available at the pharmacy. Please request the medicine by name with the pharmacy before contacting your provider for a refill.        Your To Do List     Future Labs/Procedures Complete By Expires    COMP METABOLIC PANEL  As directed 7/7/2018    HEMOGLOBIN A1C  As directed 7/6/2018    LIPID PROFILE  As directed 7/6/2018         [employer] Access Code: Activation code not generated  Current [employer] Status: Active

## 2017-07-06 NOTE — PROGRESS NOTES
Chief Complaint   Patient presents with   • Annual Exam   • Follow-Up     labs       HISTORY OF PRESENT ILLNESS: Patient is a 52 y.o. male est patient who presents today to discuss the following issues:    1. Well adult exam  Here for annual wellness visit. Reports that he has been doing well.  No new medical history to report.  He continues on his same medications, although reports some difficulty with adherence to the ASA and Statin therapy due to being dosed at night.  Labs reviewed and there is room for improvement.  No myalgias with the atorvastatin 40 mg.  A1c is up to 5.7.  No CP, SOB, or leg swelling.          Active Ambulatory Problems     Diagnosis Date Noted   • HTN (hypertension) 11/17/2010   • Anxiety 11/17/2010   • Dyslipidemia 11/17/2010   • Palpitations 07/08/2014   • Sea sickness 07/08/2014   • Rupture of right gastrocnemius tendon 07/08/2015   • Depression 05/31/2016   • Obesity (BMI 30-39.9) 05/31/2017     Resolved Ambulatory Problems     Diagnosis Date Noted   • Elevated liver function tests 11/17/2010   • Acute lumbar back pain 11/15/2013   • Sinusitis 07/08/2014     Past Medical History   Diagnosis Date   • Hypertension    • Hyperlipidemia        Allergies:Review of patient's allergies indicates no known allergies.    Current Outpatient Prescriptions   Medication Sig Dispense Refill   • citalopram (CELEXA) 20 MG Tab TAKE 1 TABLET BY MOUTH EVERY DAY 90 Tab 0   • lisinopril-hydrochlorothiazide (PRINZIDE, ZESTORETIC) 20-12.5 MG per tablet TAKE 1 TAB BY MOUTH 2 TIMES A DAY. 180 Tab 0   • atorvastatin (LIPITOR) 40 MG Tab Take 1 Tab by mouth every day. 90 Tab 3   • meclizine (ANTIVERT) 25 MG TABS Take 25 mg by mouth as needed.     • methocarbamol (ROBAXIN-750) 750 MG TABS Take 1 Tab by mouth 3 times a day. 90 Tab 0   • aspirin (ASA) 325 MG TABS Take 325 mg by mouth every day.     • docosahexanoic acid (FISH OIL) 1000 MG CAPS Take 1,000 mg by mouth 2 Times a Day.     • vitamin D (CHOLECALCIFEROL) 1000  "UNIT TABS Take 1,000 Units by mouth every day.       No current facility-administered medications for this visit.       Social History   Substance Use Topics   • Smoking status: Never Smoker    • Smokeless tobacco: Never Used   • Alcohol Use: 0.0 oz/week     0 Standard drinks or equivalent per week      Comment: rare       Family Status   Relation Status Death Age   • Mother Alive    • Father     • Sister Alive    • Brother  21     MVA   • Maternal Grandmother     • Maternal Grandfather     • Paternal Grandmother     • Paternal Grandfather     • Sister Alive      Family History   Problem Relation Age of Onset   • Heart Disease Father 70     CHF     There are no preventive care reminders to display for this patient.    ROS:  Review of Systems   Constitutional: Negative for fever, chills, weight loss and malaise/fatigue.   HENT: Negative for ear pain, nosebleeds, congestion, sore throat and neck pain.    Eyes: Negative for blurred vision.   Respiratory: Negative for cough, sputum production, shortness of breath and wheezing.    Cardiovascular: Negative for chest pain, palpitations, orthopnea and leg swelling.   Gastrointestinal: Negative for heartburn, nausea, vomiting and abdominal pain.   Genitourinary: Negative for dysuria, urgency and frequency.   Musculoskeletal: Negative for myalgias, back pain and joint pain.   Skin: Negative for rash and itching.   Neurological: Negative for dizziness, tingling, tremors, sensory change, focal weakness and headaches.   Endo/Heme/Allergies: Does not bruise/bleed easily.   Psychiatric/Behavioral: Negative for depression, suicidal ideas and memory loss.  The patient is not nervous/anxious and does not have insomnia.      Exam:  Blood pressure 132/86, pulse 72, temperature 36.6 °C (97.9 °F), resp. rate 14, height 1.727 m (5' 8\"), weight 108.41 kg (239 lb), SpO2 97 %.  General:  Well nourished, well developed male in NAD  HEENT: " Normocephalic and atraumatic. TMs clear bilaterally. Oropharynx is clear      without erythema and no tonsillar exudate. Nasal mucosa pink with minimal      Rhinorrhea.  EYES: EOMI.  Conjunctiva clear.    Neck: Supple without JVD or bruit. Thyroid is not enlarged.  Pulmonary: Clear to ausculation and percussion.  Normal effort. No rales, ronchi, or wheezing.  Cardiovascular: Regular rate and rhythm without murmur, no peripheral edema  Abdomen: positive bowel sounds.  Non tender, non distended, no hepatosplenomegaly.   MSK: normal ROM in upper and lower extremities bilaterally  Psych: appropriate affect and concentration, oriented to person and place  Neuro: no unilateral weakness in upper or lower extremities.  No gait disturbance    Please note that this dictation was created using voice recognition software. I have made every reasonable attempt to correct obvious errors, but I expect that there are errors of grammar and possibly content that I did not discover before finalizing the note.    Assessment/Plan:  1. Well adult exam  Continue exercise, work on dietary improvements      2. Impaired glucose tolerance  - HEMOGLOBIN A1C; Future    3. Mixed hyperlipidemia  - COMP METABOLIC PANEL; Future  - LIPID PROFILE; Future    Labs in 3 months follow up thereafter.

## 2017-08-21 RX ORDER — CITALOPRAM 20 MG/1
TABLET ORAL
Qty: 90 TAB | Refills: 1 | Status: SHIPPED | OUTPATIENT
Start: 2017-08-21 | End: 2018-03-04 | Stop reason: SDUPTHER

## 2017-10-16 RX ORDER — LISINOPRIL AND HYDROCHLOROTHIAZIDE 20; 12.5 MG/1; MG/1
TABLET ORAL
Qty: 180 TAB | Refills: 1 | Status: SHIPPED | OUTPATIENT
Start: 2017-10-16 | End: 2018-05-13 | Stop reason: SDUPTHER

## 2017-10-16 NOTE — TELEPHONE ENCOUNTER
Refill X 6 months, sent to pharmacy.Pt. Seen in the last 6 months per protocol.   Lab Results   Component Value Date/Time    SODIUM 135 06/29/2017 11:56 AM    POTASSIUM 4.1 06/29/2017 11:56 AM    CHLORIDE 99 06/29/2017 11:56 AM    CO2 30 06/29/2017 11:56 AM    GLUCOSE 89 06/29/2017 11:56 AM    BUN 13 06/29/2017 11:56 AM    CREATININE 1.04 06/29/2017 11:56 AM    CREATININE 1.09 01/03/2011 08:40 AM    BUNCREATRAT 15 01/03/2011 08:40 AM    GLOMRATE >59 01/03/2011 08:40 AM

## 2017-10-16 NOTE — TELEPHONE ENCOUNTER
Was the patient seen in the last year in this department? Yes     Does patient have an active prescription for medications requested? No     Received Request Via: Pharmacy      Pt met protocol?: Yes pt last ov 7/2017   BP Readings from Last 1 Encounters:   07/06/17 132/86

## 2017-11-01 ENCOUNTER — HOSPITAL ENCOUNTER (OUTPATIENT)
Dept: RADIOLOGY | Facility: MEDICAL CENTER | Age: 52
End: 2017-11-01
Attending: FAMILY MEDICINE
Payer: COMMERCIAL

## 2017-11-01 ENCOUNTER — OFFICE VISIT (OUTPATIENT)
Dept: MEDICAL GROUP | Facility: PHYSICIAN GROUP | Age: 52
End: 2017-11-01
Payer: COMMERCIAL

## 2017-11-01 VITALS
HEART RATE: 78 BPM | BODY MASS INDEX: 35.16 KG/M2 | DIASTOLIC BLOOD PRESSURE: 88 MMHG | SYSTOLIC BLOOD PRESSURE: 130 MMHG | TEMPERATURE: 97.7 F | HEIGHT: 68 IN | WEIGHT: 232 LBS | OXYGEN SATURATION: 96 % | RESPIRATION RATE: 14 BRPM

## 2017-11-01 DIAGNOSIS — M54.2 CERVICALGIA: ICD-10-CM

## 2017-11-01 DIAGNOSIS — R42 VERTIGO: ICD-10-CM

## 2017-11-01 DIAGNOSIS — M54.50 CHRONIC BILATERAL LOW BACK PAIN WITHOUT SCIATICA: ICD-10-CM

## 2017-11-01 DIAGNOSIS — Z23 NEED FOR VACCINATION: ICD-10-CM

## 2017-11-01 DIAGNOSIS — M54.50 ACUTE BILATERAL LOW BACK PAIN WITHOUT SCIATICA: ICD-10-CM

## 2017-11-01 DIAGNOSIS — G89.29 CHRONIC BILATERAL LOW BACK PAIN WITHOUT SCIATICA: ICD-10-CM

## 2017-11-01 PROCEDURE — 99214 OFFICE O/P EST MOD 30 MIN: CPT | Mod: 25 | Performed by: FAMILY MEDICINE

## 2017-11-01 PROCEDURE — 72040 X-RAY EXAM NECK SPINE 2-3 VW: CPT

## 2017-11-01 PROCEDURE — 90686 IIV4 VACC NO PRSV 0.5 ML IM: CPT | Performed by: FAMILY MEDICINE

## 2017-11-01 PROCEDURE — 90471 IMMUNIZATION ADMIN: CPT | Performed by: FAMILY MEDICINE

## 2017-11-01 RX ORDER — METHOCARBAMOL 750 MG/1
750 TABLET, FILM COATED ORAL 3 TIMES DAILY
Qty: 90 TAB | Refills: 0 | Status: SHIPPED | OUTPATIENT
Start: 2017-11-01 | End: 2019-06-25

## 2017-11-01 NOTE — PROGRESS NOTES
Chief Complaint   Patient presents with   • Neck Pain   • Back Pain   • Dizziness     x 2 months    • Immunizations     FLU        HISTORY OF PRESENT ILLNESS: Patient is a 52 y.o. male established patient here today for the following concerns:    1. Need for vaccination  Due for influenza    2. Chronic bilateral low back pain without sciatica  Here today with intermittent lower lumbar back pain with discomfort just right lateral to the spine without radiation.  Feels better with slight forward flexion and stretching.  Just started going back to the gym.  Improved with occasional ibuprofen.  No radicular symptoms.  NO pelvic pains.  Denies any hematuria or dysuria.   Will be going to chiropractic therapy later this week and getting massage therapy.      3. Cervicalgia  Reports that he has been experiencing about 2 months of intermittent right sided neck pain that radiates into his right scapula and upper shoulder.  No numbness, tingling or weakness in the right hand.  He reports that it will catch him by surprise with a turn of the head and be at level 10/10 pain for a few seconds then subsides.  He reports that he feels use of a neck brace may help him particularly at night.     4. Vertigo  Reports about the same time he has been experiencing room spinning vertigo only while lying flat.  NO dizziness or light headedness with standing, bending or stooping.  No syncope or pre-syncopal feelings.  Reports that it is not made worse by rolling over.  It does improve when he is able to be upright and standing.        Past Medical, Social, and Family history reviewed and updated in EPIC    Allergies:Review of patient's allergies indicates no known allergies.    Current Outpatient Prescriptions   Medication Sig Dispense Refill   • methocarbamol (ROBAXIN-750) 750 MG Tab Take 1 Tab by mouth 3 times a day. 90 Tab 0   • lisinopril-hydrochlorothiazide (PRINZIDE, ZESTORETIC) 20-12.5 MG per tablet TAKE 1 TAB BY MOUTH 2 TIMES A DAY.  "180 Tab 1   • citalopram (CELEXA) 20 MG Tab TAKE 1 TABLET BY MOUTH EVERY DAY 90 Tab 1   • atorvastatin (LIPITOR) 40 MG Tab Take 1 Tab by mouth every day. 90 Tab 3   • aspirin (ASA) 325 MG TABS Take 325 mg by mouth every day.     • docosahexanoic acid (FISH OIL) 1000 MG CAPS Take 1,000 mg by mouth 2 Times a Day.     • vitamin D (CHOLECALCIFEROL) 1000 UNIT TABS Take 1,000 Units by mouth every day.       No current facility-administered medications for this visit.          ROS:  Review of Systems   Constitutional: Negative for fever, chills, weight loss and malaise/fatigue.   HENT: Negative for ear pain, nosebleeds, congestion, sore throat and +neck pain.    Eyes: Negative for blurred vision.   Respiratory: Negative for cough, sputum production, shortness of breath and wheezing.    Cardiovascular: Negative for chest pain, palpitations,  and leg swelling.   Gastrointestinal: Negative for heartburn, nausea, vomiting, diarrhea and abdominal pain.   Genitourinary: Negative for dysuria, urgency and frequency.   Musculoskeletal: Negative for myalgias,+ back pain and joint pain.   Skin: Negative for rash and itching.   Neurological:  + dizziness,no  tingling, tremors, sensory change, focal weakness and headaches.   Endo/Heme/Allergies: Does not bruise/bleed easily.   Psychiatric/Behavioral: Negative for depression, anxiety, suicidal ideas, insomnia and memory loss.      Exam:  Blood pressure 130/88, pulse 78, temperature 36.5 °C (97.7 °F), resp. rate 14, height 1.727 m (5' 8\"), weight 105.2 kg (232 lb), SpO2 96 %.    General:  Well nourished, well developed in NAD  Head is grossly normal.  Neck: Supple without JVD.  No midline tenderness. Normal ROM.   Neuro:  strength is 5/5 throughout.    Pulmonary:  Normal effort. CTAB  Cardiovascular: Regular rate and rhythm   Extremities: no clubbing, cyanosis, or edema.  Psych: affect appropriate  MSK: non-tender midline to palpation.  Full ROM.  Antalgic gait.      Please note that " this dictation was created using voice recognition software. I have made every reasonable attempt to correct obvious errors, but I expect that there are errors of grammar and possibly content that I did not discover before finalizing the note.    Assessment/Plan:  1. Need for vaccination  - INFLUENZA VACCINE QUAD INJ >3Y(PF)    2. Chronic bilateral low back pain without sciatica  Suspect Facet arthropathy on the right.   - REFERRAL TO PHYSICAL THERAPY Reason for Therapy: Eval/Treat/Report    3. Cervicalgia  Concerning for possible instability   - methocarbamol (ROBAXIN-750) 750 MG Tab; Take 1 Tab by mouth 3 times a day.  Dispense: 90 Tab; Refill: 0  - REFERRAL TO PHYSICAL THERAPY Reason for Therapy: Eval/Treat/Report  - DX-CERVICAL SPINE-FLX-EXT ONLY; Future    4. Vertigo  - DX-CERVICAL SPINE-FLX-EXT ONLY; Future  Suspect that this vertigo is cervical in origin.  We will get flex ex films to help r/o instability   PT and chiropractic as planned      Follow up in 4-6 weeks.

## 2017-11-13 ENCOUNTER — PHYSICAL THERAPY (OUTPATIENT)
Dept: PHYSICAL THERAPY | Facility: REHABILITATION | Age: 52
End: 2017-11-13
Attending: FAMILY MEDICINE
Payer: COMMERCIAL

## 2017-11-13 DIAGNOSIS — G89.29 CHRONIC LOW BACK PAIN, UNSPECIFIED BACK PAIN LATERALITY, WITH SCIATICA PRESENCE UNSPECIFIED: ICD-10-CM

## 2017-11-13 DIAGNOSIS — M54.5 CHRONIC LOW BACK PAIN, UNSPECIFIED BACK PAIN LATERALITY, WITH SCIATICA PRESENCE UNSPECIFIED: ICD-10-CM

## 2017-11-13 DIAGNOSIS — M54.2 CERVICALGIA: ICD-10-CM

## 2017-11-13 PROCEDURE — 97012 MECHANICAL TRACTION THERAPY: CPT

## 2017-11-13 PROCEDURE — 97162 PT EVAL MOD COMPLEX 30 MIN: CPT

## 2017-11-13 ASSESSMENT — ENCOUNTER SYMPTOMS
QUALITY: ACHING
PAIN SCALE AT HIGHEST: 10
PAIN SCALE AT LOWEST: 3
QUALITY: DISCOMFORT
PAIN SCALE: 4
EXACERBATED BY: PROLONGED SITTING
PAIN TIMING: ALL DAY
EXACERBATED BY: BENDING

## 2017-11-13 NOTE — OP THERAPY EVALUATION
Outpatient Physical Therapy  INITIAL EVALUATION    [facility] Physical Therapy [address]  [address]  [place] [address]  Phone:  [phone]  Fax:  [phone]    Date of Evaluation: 2017    Patient: Lawrence Garrett  YOB: 1965  MRN: [id number]     Referring Provider: Loraine Hendrix M.D.  [address] [place] [address]  [address]  [place] [address]   Referring Diagnosis Chronic bilateral low back pain without sciatica [M54.5, G89.29];Cervicalgia [M54.2]     Time Calculation  Start time: 0800  Stop time: 0915 Time Calculation (min): 75 minutes     Physical Therapy Occurrence Codes    Date physical therapy care plan established or reviewed:  17   Date physical therapy treatment started:  17             Chief Complaint: Neck Pain (with dizziness when lying down)    Visit Diagnoses     ICD-10-CM   1. Cervicalgia M54.2   2. Chronic low back pain, unspecified back pain laterality, with sciatica presence unspecified M54.5    G89.29         Subjective:   History of Present Illness:     Mechanism of injury:  Patient reports biggest concern is insidious onset neck pain with dizziness when lying flat. Patient goes to the gym 5x days a week and report frequently hurts himself. Patient reports the gym does help sxs. The pain and dizziness is unchanged since beginning. Patient also has low back pain chronically. Patient has been to the chiropractor, since the neck pain started and massage, reports some relief.   Prior level of function:  Patient works as a , 40 hrs a week, does have standing job. In spare time, does search and rescue.   Headaches:  no headaches  Sleep disturbance:  Interrupted sleep (Patient waking approx 4 times a night )  Pain:     Current pain ratin    At best pain rating:  3    At worst pain rating:  10 (intermittently, for short duration )    Quality:  Aching and discomfort (denies any sxs into UE)    Pain timing:  All day    Alleviating factors:  "Foam Roller, (every couple of days)    Aggravating factors:  Prolonged sitting and bending (extension with rotation, posture )    Progression:  Unchanged  Social Support:     Lives in:  One-story house    Lives with:  Spouse  Hand dominance:  Right  Diagnostic Tests:     X-ray: abnormal      Diagnostic Tests Comments:  Per x-ray report: \"There is degenerative disc space narrowing with endplate spurring and sclerosis at the C5-6 level. There may be mild disc space narrowing and anterior endplate spurring at the C4-5 level. There is no significant arthropathy evident.\"  Treatments:     Previous treatment:  Chiropractic, massage and medication (meds as needed but makes patient very drowsy )    Current treatment:  Chiropractic, massage and medication (occasionally )  Patient Goals:     Other patient goals:  To improve movement with neck.       Past Medical History:   Diagnosis Date   • Anxiety    • Dyslipidemia 11/17/2010   • Elevated liver function tests 11/17/2010   • HTN (hypertension) 11/17/2010   • Hyperlipidemia    • Hypertension      Past Surgical History:   Procedure Laterality Date   • VASECTOMY       Social History   Substance Use Topics   • Smoking status: Never Smoker   • Smokeless tobacco: Never Used   • Alcohol use 0.0 oz/week      Comment: rare     Family and Occupational History     Social History   • Marital status:      Spouse name: N/A   • Number of children: N/A   • Years of education: N/A       Objective     Postural Observations  Seated posture: fair  Correction of posture: makes symptoms better    Additional Postural Observation Details  In sitting, improvement of cervical rotation to decreased pain with 60 degrees symmetrical.     Neurological Testing     Myotome testing   Cervical (left)   All left cervical myotomes within normal limits    Cervical (right)   All right cervical myotomes within normal limits    Dermatome testing   Cervical (left)   All left cervical dermatomes " intact    Cervical (right)   All right cervical dermatomes intact    Active Range of Motion     Cervical Spine   Flexion: within functional limits (with upper cervical pain)  Extension: within functional limits (pain lower cervical at end range)  Left rotation: Active left cervical rotation: 50 degrees.  Right rotation: Active right cervical rotation: 40 degrees with pain     Joint Play   Spine     Central PA [place]        T1: hypomobile       T2: hypomobile       T3: hypomobile       T4: hypomobile       T5: painful and hypomobile       T6: hypomobile and painful    Unilateral PA Glide (left)        C3: painful       C4: painful       C5: painful    1st Rib Caudal Glide        Left: WFL       Right: painful and hypomobile        General Comments     Spine Comments   NDI=9/50         Therapeutic Exercises:     1. Foam Roller progression (HEP)    2. Chin tuck with retraction , x 10     Treatments:    1. Traction (mechanical), 15 minutes 14/7 lbs 60/20 with MHP, reported no pain after tx.       Assessment, Response and Plan:   Impairments: activity intolerance, difficulty performing job, impaired functional mobility, limited mobility and pain with function    Assessment details:  Patient is a 52 year old who present with constant neck pain with insidious onset approx 2-3 months ago with dizziness when lying flat limiting patient from sleeping through the night. On examination, patient found to have thoracic hypomobilty, poor posture, decreased cervical ROM,  and cervical TTP C4-C6. Patient will benefit from skilled PT to improve mobility and sleep through night.   Patient did also report low back pain, but agrees to address the neck first and then address the lumbar spine at later time if not improving with postural training.   Goals:   Short Term Goals:   1) Patient will report only waking <2x a night   2) Patient will improve cervical rotation ROM > 10 degrees      Long Term Goals:    1) Patient will be independent  with HEP   2) Patient will be able to sleep through night with no pain   3) Patient will score <5/50 on NDI  Long term goal time span:  2-4 months    Plan:   Therapy options:  Physical therapy treatment to continue  Planned therapy interventions:  Therapeutic activities, home exercise program, manual therapy, neuromuscular re-education, patient education and safety awareness  Other planned therapy interventions:  Cervical traction, electrical stimulation   Frequency: 1-2 x.  Duration in visits:  10  Duration in weeks:  16  Discussed with:  Patient  Plan details:  Anticipate visits spaced out over 1x 1-2 weeks as patient becomes more independent with HEP and self managing sxs.       Referring provider co-signature:  I have reviewed this plan of care and my co-signature certifies the need for services.  Certification Dates:   From 11/13/2017       To 03/13/2017    Physician Signature: ________________________________ Date: ______________

## 2017-11-20 ENCOUNTER — PHYSICAL THERAPY (OUTPATIENT)
Dept: PHYSICAL THERAPY | Facility: REHABILITATION | Age: 52
End: 2017-11-20
Attending: FAMILY MEDICINE
Payer: COMMERCIAL

## 2017-11-20 DIAGNOSIS — M54.2 CERVICALGIA: ICD-10-CM

## 2017-11-20 DIAGNOSIS — G89.29 CHRONIC LOW BACK PAIN, UNSPECIFIED BACK PAIN LATERALITY, WITH SCIATICA PRESENCE UNSPECIFIED: ICD-10-CM

## 2017-11-20 DIAGNOSIS — M54.5 CHRONIC LOW BACK PAIN, UNSPECIFIED BACK PAIN LATERALITY, WITH SCIATICA PRESENCE UNSPECIFIED: ICD-10-CM

## 2017-11-20 PROCEDURE — 97012 MECHANICAL TRACTION THERAPY: CPT

## 2017-11-20 PROCEDURE — 97110 THERAPEUTIC EXERCISES: CPT

## 2017-11-20 PROCEDURE — 95992 CANALITH REPOSITIONING PROC: CPT

## 2017-11-20 NOTE — OP THERAPY DAILY TREATMENT
"  Outpatient Physical Therapy  DAILY TREATMENT     [facility] Physical [address].  [address]  [place] [address]  Phone:  [phone]  Fax:  [phone]    Date: 11/20/2017    Patient: Lawrence Garrett  YOB: 1965  MRN: [id number]     Time Calculation    1500  1545  45     Chief Complaint: Neck Problem and Back Problem    Visit #: 2    SUBJECTIVE:  States c-traction helped last visit  OBJECTIVE:  Current objective measures:  Minor cervical/lumbar restrictions. Evaluated for BPPV. + (R) horizontal geotrophic BPPV      Therapeutic Exercises (CPT 95362):     1. Trap/levator stretch, 3x15    2. Lumbar sidelying stretch, 3x15    3. Core dying bug    Therapeutic Treatments and Modalities:     2. Canalith Repositioning (CPT 29932), Gufojuanai manuver minutes    Therapeutic Treatment and Modalities Summary: c-traction 15' 20/14# 40/7\"      Pain rating before treatment: 4  Pain rating after treatment: 2    ASSESSMENT:   Response to treatment: a little less pain    PLAN/RECOMMENDATIONS:   Plan for treatment: therapy treatment to continue next visit.  Planned interventions for next visit: continue with current treatment, canalith repositioning (CPT 35460), E-stim unattended (CPT 11192), manual therapy (CPT 84919), mechanical traction (CPT 94063) and therapeutic exercise (CPT 25546).      "

## 2017-11-22 ENCOUNTER — PHYSICAL THERAPY (OUTPATIENT)
Dept: PHYSICAL THERAPY | Facility: REHABILITATION | Age: 52
End: 2017-11-22
Attending: FAMILY MEDICINE
Payer: COMMERCIAL

## 2017-11-22 DIAGNOSIS — M54.2 CERVICALGIA: ICD-10-CM

## 2017-11-22 DIAGNOSIS — M54.5 CHRONIC LOW BACK PAIN, UNSPECIFIED BACK PAIN LATERALITY, WITH SCIATICA PRESENCE UNSPECIFIED: ICD-10-CM

## 2017-11-22 DIAGNOSIS — G89.29 CHRONIC LOW BACK PAIN, UNSPECIFIED BACK PAIN LATERALITY, WITH SCIATICA PRESENCE UNSPECIFIED: ICD-10-CM

## 2017-11-22 PROCEDURE — 97012 MECHANICAL TRACTION THERAPY: CPT

## 2017-11-22 PROCEDURE — 97110 THERAPEUTIC EXERCISES: CPT

## 2017-11-22 NOTE — OP THERAPY DAILY TREATMENT
"  Outpatient Physical Therapy  DAILY TREATMENT     [facility] Physical Therapy [address]  [address]  [place] [address]  Phone:  [phone]  Fax:  [phone]    Date: 11/22/2017    Patient: Lawrence Garrett  YOB: 1965  MRN: [id number]     Time Calculation             Chief Complaint: Neck Pain and Back Problem    Visit #: 3    SUBJECTIVE:  A little less dizzy after last tx. States tx felt good  OBJECTIVE:  Current objective measures: 2 beat horizontal nystagmus. Performed Gufojuanai manuwinston       Therapeutic Exercises (CPT 14321):     1. Prone chin tucks , 10 x10\"    2.  quadraped ue/le lifts, 10 each side    3. Swiss ball supermans, 2# x 20    Therapeutic Treatments and Modalities:     1. Mechanical Traction (CPT 21477), 15' minutes, cervical  canalith repositioning        ASSESSMENT:   Response to treatment: less nystagmus    PLAN/RECOMMENDATIONS:   Plan for treatment: therapy treatment to continue next visit.        "

## 2017-11-27 ENCOUNTER — APPOINTMENT (OUTPATIENT)
Dept: PHYSICAL THERAPY | Facility: REHABILITATION | Age: 52
End: 2017-11-27
Attending: FAMILY MEDICINE
Payer: COMMERCIAL

## 2017-11-29 ENCOUNTER — APPOINTMENT (OUTPATIENT)
Dept: PHYSICAL THERAPY | Facility: REHABILITATION | Age: 52
End: 2017-11-29
Attending: FAMILY MEDICINE
Payer: COMMERCIAL

## 2017-12-04 ENCOUNTER — PHYSICAL THERAPY (OUTPATIENT)
Dept: PHYSICAL THERAPY | Facility: REHABILITATION | Age: 52
End: 2017-12-04
Attending: FAMILY MEDICINE
Payer: COMMERCIAL

## 2017-12-04 DIAGNOSIS — M54.2 CERVICALGIA: ICD-10-CM

## 2017-12-04 DIAGNOSIS — M54.5 CHRONIC LOW BACK PAIN, UNSPECIFIED BACK PAIN LATERALITY, WITH SCIATICA PRESENCE UNSPECIFIED: ICD-10-CM

## 2017-12-04 DIAGNOSIS — G89.29 CHRONIC LOW BACK PAIN, UNSPECIFIED BACK PAIN LATERALITY, WITH SCIATICA PRESENCE UNSPECIFIED: ICD-10-CM

## 2017-12-04 PROCEDURE — 97140 MANUAL THERAPY 1/> REGIONS: CPT

## 2017-12-04 PROCEDURE — 97014 ELECTRIC STIMULATION THERAPY: CPT

## 2017-12-04 PROCEDURE — 97012 MECHANICAL TRACTION THERAPY: CPT

## 2017-12-04 NOTE — OP THERAPY DAILY TREATMENT
"  Outpatient Physical Therapy  DAILY TREATMENT     [facility] Physical [address].  [address]  [place] [address]  Phone:  [phone]  Fax:  [phone]    Date: 12/04/2017    Patient: Lawrence Garrett  YOB: 1965  MRN: [id number]     Time Calculation  Start time: 0800  Stop time: 0845 Time Calculation (min): 45 minutes     Chief Complaint: Neck Pain and Back Problem    Visit #: 4    SUBJECTIVE:  States just came from gym. Muscles a little sore. Neck 1/10  OBJECTIVE:  Current objective measures: cervical rom 90%. Lumbar extension mod limited      Therapeutic Exercises (CPT 10120):     1. Prone press ups with resistance., 20    2. Sidelying stretch , 15\"      Therapeutic Exercise Summary: Sounds like has a good gym program    Therapeutic Treatments and Modalities:     Therapeutic Treatment and Modalities Summary: Mayur therapy to cervical as well as lumbar. c-Tx 20/14# 60/10\" 15' followed with E-stim /MH to lumbar.        ASSESSMENT:   Response to treatment: graduallly improving. Still intermittamt vertigo roll (R). States does self TX - Gufojuanai manuver 3x day  PLAN/RECOMMENDATIONS:   Plan for treatment: therapy treatment to continue next visit.  Planned interventions for next visit: canalith repositioning (CPT 88516), E-stim unattended (CPT 73041), manual therapy (CPT 40391), mechanical traction (CPT 76368) and therapeutic exercise (CPT 28991).      "

## 2017-12-06 ENCOUNTER — PHYSICAL THERAPY (OUTPATIENT)
Dept: PHYSICAL THERAPY | Facility: REHABILITATION | Age: 52
End: 2017-12-06
Attending: FAMILY MEDICINE
Payer: COMMERCIAL

## 2017-12-06 DIAGNOSIS — M54.5 CHRONIC LOW BACK PAIN, UNSPECIFIED BACK PAIN LATERALITY, WITH SCIATICA PRESENCE UNSPECIFIED: ICD-10-CM

## 2017-12-06 DIAGNOSIS — G89.29 CHRONIC LOW BACK PAIN, UNSPECIFIED BACK PAIN LATERALITY, WITH SCIATICA PRESENCE UNSPECIFIED: ICD-10-CM

## 2017-12-06 DIAGNOSIS — M54.2 CERVICALGIA: ICD-10-CM

## 2017-12-06 PROCEDURE — 97012 MECHANICAL TRACTION THERAPY: CPT

## 2017-12-06 NOTE — OP THERAPY DAILY TREATMENT
"  Outpatient Physical Therapy  DAILY TREATMENT     [facility] Physical [address].  [address]  [place] [address]  Phone:  [phone]  Fax:  [phone]    Date: 12/06/2017    Patient: Lawrence Garrett  YOB: 1965  MRN: [id number]     Time Calculation  Start time: 0800  Stop time: 0830 Time Calculation (min): 30 minutes     Chief Complaint: Back Problem and Neck Pain    Visit #: 5    SUBJECTIVE:  States less neck /back pain. Today, neck 0/10, LBP 1/10 but can vary. Also less c/o vertigo  OBJECTIVE:  Current objective measures: Positional testing all canals (-). Initially had horizontal canalithiesis. He has been compliant with self Tx and reports much less dizziness. Cervical ROM has improved by ~ 10'. He still has somewhat limited trunk ext. But this has improved      Therapeutic Exercises (CPT 68339):     Total treatment time spent on Therapeutic Exercises: 15 minutes.      1. Canalith repositioning all canals, No vertigo with testing    2. Prone press ups, 20    Therapeutic Treatments and Modalities:     1. Mechanical Traction (CPT 12370), 15' minutes, lumbar , 100/70# 40 7\"             ASSESSMENT:   Response to treatment:  Improved ROM, Decreased pain, Less dizziness    PLAN/RECOMMENDATIONS:   Plan for treatment: D/C with HEP  "

## 2017-12-06 NOTE — OP THERAPY DISCHARGE SUMMARY
Outpatient Physical Therapy  DISCHARGE SUMMARY NOTE      [facility] Physical Therapy [address].  [address]  [place] [address]  Phone:  [phone]  Fax:  [phone]    Date of Visit: 12/06/2017    Patient: Lawrence Garrett  YOB: 1965  MRN: [id number]     Referring Provider: Loraine Hendrix M.D.  [address] [place] [address]   Referring Diagnosis Low back pain [M54.5];Cervicalgia [M54.2]     Physical Therapy Occurrence Codes    Date physical therapy care plan established or reviewed:  12/6/17   Date physical therapy treatment started:  11/13/17          Functional Limitation G-Codes and Severity Modifiers  Goal:     Discharge:         Your patient is being discharged from Physical Therapy with the following comments:   · Goals met    Comments:  See daily note for objective parameters     Limitations Remaining:  See note    Recommendations:  D/C with BRITT Meza, PT    Date: 12/6/2017

## 2017-12-11 ENCOUNTER — APPOINTMENT (OUTPATIENT)
Dept: PHYSICAL THERAPY | Facility: REHABILITATION | Age: 52
End: 2017-12-11
Attending: FAMILY MEDICINE
Payer: COMMERCIAL

## 2017-12-13 ENCOUNTER — APPOINTMENT (OUTPATIENT)
Dept: PHYSICAL THERAPY | Facility: REHABILITATION | Age: 52
End: 2017-12-13
Attending: FAMILY MEDICINE
Payer: COMMERCIAL

## 2017-12-26 DIAGNOSIS — E78.5 HYPERLIPIDEMIA, UNSPECIFIED HYPERLIPIDEMIA TYPE: ICD-10-CM

## 2017-12-26 NOTE — TELEPHONE ENCOUNTER
Was the patient seen in the last year in this department? Yes     Does patient have an active prescription for medications requested? No     Received Request Via: Patient mandeep

## 2017-12-27 RX ORDER — ATORVASTATIN CALCIUM 40 MG/1
40 TABLET, FILM COATED ORAL DAILY
Qty: 90 TAB | Refills: 0 | Status: SHIPPED | OUTPATIENT
Start: 2017-12-27 | End: 2018-03-29 | Stop reason: SDUPTHER

## 2017-12-27 NOTE — TELEPHONE ENCOUNTER
See MA's notes below, pt has met protocol, OV 11/17   Lab Results   Component Value Date/Time    CHOLSTRLTOT 235 (H) 06/29/2017 11:56 AM     (H) 06/29/2017 11:56 AM    HDL 50 06/29/2017 11:56 AM    TRIGLYCERIDE 303 (H) 06/29/2017 11:56 AM       Lab Results   Component Value Date/Time    SODIUM 135 06/29/2017 11:56 AM    POTASSIUM 4.1 06/29/2017 11:56 AM    CHLORIDE 99 06/29/2017 11:56 AM    CO2 30 06/29/2017 11:56 AM    GLUCOSE 89 06/29/2017 11:56 AM    BUN 13 06/29/2017 11:56 AM    CREATININE 1.04 06/29/2017 11:56 AM    CREATININE 1.09 01/03/2011 08:40 AM    BUNCREATRAT 15 01/03/2011 08:40 AM    GLOMRATE >59 01/03/2011 08:40 AM     Lab Results   Component Value Date/Time    ALKPHOSPHAT 82 06/29/2017 11:56 AM    ASTSGOT 20 06/29/2017 11:56 AM    ALTSGPT 30 06/29/2017 11:56 AM    TBILIRUBIN 1.0 06/29/2017 11:56 AM

## 2018-01-04 ENCOUNTER — HOSPITAL ENCOUNTER (OUTPATIENT)
Dept: LAB | Facility: MEDICAL CENTER | Age: 53
End: 2018-01-04
Attending: FAMILY MEDICINE
Payer: COMMERCIAL

## 2018-01-04 DIAGNOSIS — R73.02 IMPAIRED GLUCOSE TOLERANCE: ICD-10-CM

## 2018-01-04 DIAGNOSIS — E78.2 MIXED HYPERLIPIDEMIA: ICD-10-CM

## 2018-01-04 LAB
ALBUMIN SERPL BCP-MCNC: 4.5 G/DL (ref 3.2–4.9)
ALBUMIN/GLOB SERPL: 1.7 G/DL
ALP SERPL-CCNC: 78 U/L (ref 30–99)
ALT SERPL-CCNC: 37 U/L (ref 2–50)
ANION GAP SERPL CALC-SCNC: 8 MMOL/L (ref 0–11.9)
AST SERPL-CCNC: 23 U/L (ref 12–45)
BILIRUB SERPL-MCNC: 1.7 MG/DL (ref 0.1–1.5)
BUN SERPL-MCNC: 13 MG/DL (ref 8–22)
CALCIUM SERPL-MCNC: 9.5 MG/DL (ref 8.5–10.5)
CHLORIDE SERPL-SCNC: 102 MMOL/L (ref 96–112)
CHOLEST SERPL-MCNC: 130 MG/DL (ref 100–199)
CO2 SERPL-SCNC: 29 MMOL/L (ref 20–33)
CREAT SERPL-MCNC: 1 MG/DL (ref 0.5–1.4)
EST. AVERAGE GLUCOSE BLD GHB EST-MCNC: 123 MG/DL
GFR SERPL CREATININE-BSD FRML MDRD: >60 ML/MIN/1.73 M 2
GLOBULIN SER CALC-MCNC: 2.7 G/DL (ref 1.9–3.5)
GLUCOSE SERPL-MCNC: 96 MG/DL (ref 65–99)
HBA1C MFR BLD: 5.9 % (ref 0–5.6)
HDLC SERPL-MCNC: 52 MG/DL
LDLC SERPL CALC-MCNC: 46 MG/DL
POTASSIUM SERPL-SCNC: 3.8 MMOL/L (ref 3.6–5.5)
PROT SERPL-MCNC: 7.2 G/DL (ref 6–8.2)
SODIUM SERPL-SCNC: 139 MMOL/L (ref 135–145)
TRIGL SERPL-MCNC: 161 MG/DL (ref 0–149)

## 2018-01-04 PROCEDURE — 36415 COLL VENOUS BLD VENIPUNCTURE: CPT

## 2018-01-04 PROCEDURE — 80053 COMPREHEN METABOLIC PANEL: CPT

## 2018-01-04 PROCEDURE — 83036 HEMOGLOBIN GLYCOSYLATED A1C: CPT

## 2018-01-04 PROCEDURE — 80061 LIPID PANEL: CPT

## 2018-03-05 RX ORDER — CITALOPRAM 20 MG/1
TABLET ORAL
Qty: 90 TAB | Refills: 1 | Status: SHIPPED | OUTPATIENT
Start: 2018-03-05 | End: 2018-09-19 | Stop reason: SDUPTHER

## 2018-03-05 NOTE — TELEPHONE ENCOUNTER
Was the patient seen in the last year in this department? Yes     Does patient have an active prescription for medications requested? No     Received Request Via: Pharmacy      Pt met protocol?: Yes pt last ov 11/17

## 2018-03-29 DIAGNOSIS — E78.5 HYPERLIPIDEMIA, UNSPECIFIED HYPERLIPIDEMIA TYPE: ICD-10-CM

## 2018-03-29 RX ORDER — ATORVASTATIN CALCIUM 40 MG/1
40 TABLET, FILM COATED ORAL DAILY
Qty: 90 TAB | Refills: 1 | Status: SHIPPED | OUTPATIENT
Start: 2018-03-29 | End: 2018-12-12 | Stop reason: SDUPTHER

## 2018-03-29 NOTE — TELEPHONE ENCOUNTER
Was the patient seen in the last year in this department? Yes     Does patient have an active prescription for medications requested? No     Received Request Via: Pharmacy    Pt met protocol?: Yes     Last OV 11/21017    Lab Results  Component Value Date/Time   CHOLSTRLTOT 130 01/04/2018 0610   TRIGLYCERIDE 161 (H) 01/04/2018 0610   HDL 52 01/04/2018 0610   LDL 46 01/04/2018 0610

## 2018-03-29 NOTE — TELEPHONE ENCOUNTER
Pt has had OV within the 12 month protocol and lipid panel is current. 6 month supply sent to pharmacy.   Lab Results   Component Value Date/Time    CHOLSTRLTOT 130 01/04/2018 06:10 AM    LDL 46 01/04/2018 06:10 AM    HDL 52 01/04/2018 06:10 AM    TRIGLYCERIDE 161 (H) 01/04/2018 06:10 AM       Lab Results   Component Value Date/Time    SODIUM 139 01/04/2018 06:10 AM    POTASSIUM 3.8 01/04/2018 06:10 AM    CHLORIDE 102 01/04/2018 06:10 AM    CO2 29 01/04/2018 06:10 AM    GLUCOSE 96 01/04/2018 06:10 AM    BUN 13 01/04/2018 06:10 AM    CREATININE 1.00 01/04/2018 06:10 AM    CREATININE 1.09 01/03/2011 08:40 AM    BUNCREATRAT 15 01/03/2011 08:40 AM    GLOMRATE >59 01/03/2011 08:40 AM     Lab Results   Component Value Date/Time    ALKPHOSPHAT 78 01/04/2018 06:10 AM    ASTSGOT 23 01/04/2018 06:10 AM    ALTSGPT 37 01/04/2018 06:10 AM    TBILIRUBIN 1.7 (H) 01/04/2018 06:10 AM

## 2018-05-14 RX ORDER — LISINOPRIL AND HYDROCHLOROTHIAZIDE 20; 12.5 MG/1; MG/1
TABLET ORAL
Qty: 180 TAB | Refills: 0 | Status: SHIPPED | OUTPATIENT
Start: 2018-05-14 | End: 2018-06-25 | Stop reason: SDUPTHER

## 2018-05-14 NOTE — TELEPHONE ENCOUNTER
Was the patient seen in the last year in this department? Yes     Does patient have an active prescription for medications requested? No     Received Request Via: Pharmacy      Pt met protocol?: No pt last ov 11/2017   BP Readings from Last 1 Encounters:   11/01/17 130/88

## 2018-05-31 ENCOUNTER — HOSPITAL ENCOUNTER (OUTPATIENT)
Dept: LAB | Facility: MEDICAL CENTER | Age: 53
End: 2018-05-31
Attending: FAMILY MEDICINE
Payer: COMMERCIAL

## 2018-05-31 ENCOUNTER — OFFICE VISIT (OUTPATIENT)
Dept: MEDICAL GROUP | Facility: PHYSICIAN GROUP | Age: 53
End: 2018-05-31
Payer: COMMERCIAL

## 2018-05-31 VITALS
RESPIRATION RATE: 16 BRPM | SYSTOLIC BLOOD PRESSURE: 124 MMHG | BODY MASS INDEX: 36.28 KG/M2 | WEIGHT: 239.4 LBS | HEART RATE: 74 BPM | TEMPERATURE: 97.6 F | OXYGEN SATURATION: 98 % | HEIGHT: 68 IN | DIASTOLIC BLOOD PRESSURE: 88 MMHG

## 2018-05-31 DIAGNOSIS — R60.9 PERIPHERAL EDEMA: ICD-10-CM

## 2018-05-31 DIAGNOSIS — R00.2 PALPITATIONS: ICD-10-CM

## 2018-05-31 DIAGNOSIS — L91.8 SKIN TAG: ICD-10-CM

## 2018-05-31 DIAGNOSIS — R20.9 SKIN SENSATION DISTURBANCE: ICD-10-CM

## 2018-05-31 LAB — TSH SERPL DL<=0.005 MIU/L-ACNC: 1.27 UIU/ML (ref 0.38–5.33)

## 2018-05-31 PROCEDURE — 99214 OFFICE O/P EST MOD 30 MIN: CPT | Mod: 25 | Performed by: FAMILY MEDICINE

## 2018-05-31 PROCEDURE — 93000 ELECTROCARDIOGRAM COMPLETE: CPT | Performed by: FAMILY MEDICINE

## 2018-05-31 PROCEDURE — 84443 ASSAY THYROID STIM HORMONE: CPT

## 2018-05-31 PROCEDURE — 17110 DESTRUCTION B9 LES UP TO 14: CPT | Performed by: FAMILY MEDICINE

## 2018-05-31 PROCEDURE — 99999 CLINIC EKG: CPT | Performed by: FAMILY MEDICINE

## 2018-05-31 PROCEDURE — 36415 COLL VENOUS BLD VENIPUNCTURE: CPT

## 2018-05-31 ASSESSMENT — PATIENT HEALTH QUESTIONNAIRE - PHQ9: CLINICAL INTERPRETATION OF PHQ2 SCORE: 0

## 2018-05-31 NOTE — PROGRESS NOTES
Chief Complaint   Patient presents with   • Other     skin tags       HISTORY OF PRESENT ILLNESS: Patient is a 53 y.o. male established patient here today for the following concerns:    1. Skin tag  2. Skin sensation disturbance  Here today to have his skin tags treated over the neck, axillae and waistline, and neck that typically get snagged on clothing and are bothersome.  Has not tried any OTC treatments.      3. Palpitations  Reports also he is still getting some palpiations without any provocation.  No change in caffeine intake.  He has also noted some edema of the shin area.  Previously found to have some PVCs on routine EKG.  Would like to look into it again.    4. Peripheral edema  As mentioned above notes, even by early morning has indentation of sock lines and feels puffy.  No pain.  No orthopnea or PND.  No CP.        Past Medical, Social, and Family history reviewed and updated in EPIC    Allergies:Patient has no known allergies.    Current Outpatient Prescriptions   Medication Sig Dispense Refill   • lisinopril-hydrochlorothiazide (PRINZIDE, ZESTORETIC) 20-12.5 MG per tablet TAKE 1 TAB BY MOUTH 2 TIMES A DAY. 180 Tab 0   • atorvastatin (LIPITOR) 40 MG Tab TAKE 1 TAB BY MOUTH EVERY DAY. 90 Tab 1   • citalopram (CELEXA) 20 MG Tab TAKE 1 TABLET BY MOUTH EVERY DAY 90 Tab 1   • methocarbamol (ROBAXIN-750) 750 MG Tab Take 1 Tab by mouth 3 times a day. 90 Tab 0   • aspirin (ASA) 325 MG TABS Take 325 mg by mouth every day.     • docosahexanoic acid (FISH OIL) 1000 MG CAPS Take 1,000 mg by mouth 2 Times a Day.     • vitamin D (CHOLECALCIFEROL) 1000 UNIT TABS Take 1,000 Units by mouth every day.       No current facility-administered medications for this visit.          ROS:  Review of Systems   Constitutional: Negative for fever, chills, weight loss and malaise/fatigue.   HENT: Negative for ear pain, nosebleeds, congestion, sore throat and neck pain.    Eyes: Negative for blurred vision.   Respiratory: Negative  "for cough, sputum production, shortness of breath and wheezing.    Cardiovascular: Negative for chest pain, palpitations,  and leg swelling.   Gastrointestinal: Negative for heartburn, nausea, vomiting, diarrhea and abdominal pain.   Genitourinary: Negative for dysuria, urgency and frequency.   Musculoskeletal: Negative for myalgias, back pain and joint pain.   Skin: Negative for rash and itching.   Neurological: Negative for dizziness, tingling, tremors, sensory change, focal weakness and headaches.   Endo/Heme/Allergies: Does not bruise/bleed easily.   Psychiatric/Behavioral: Negative for depression, anxiety, suicidal ideas, insomnia and memory loss.      Exam:  Blood pressure 124/88, pulse 74, temperature 36.4 °C (97.6 °F), resp. rate 16, height 1.727 m (5' 8\"), weight 108.6 kg (239 lb 6.4 oz), SpO2 98 %.    General:  Well nourished, well developed in NAD  Head is grossly normal.  Neck: Supple without JVD   Pulmonary:  Normal effort.   Cardiovascular: Regular rate  Extremities: no clubbing, cyanosis, or edema.  Psych: affect appropriate  Skin: multiple pedunculated flesh colored tags as mention above.     CRYOTHERAPY: With patient's permission. 5 Lesion(s) was identified and 3 rounds of liquid nitrogen was used in a 30 second freeze thaw cycle.  Patient tolerated the procedure well.       Please note that this dictation was created using voice recognition software. I have made every reasonable attempt to correct obvious errors, but I expect that there are errors of grammar and possibly content that I did not discover before finalizing the note.    Assessment/Plan:  1. Skin tag  2. Skin sensation disturbance  Cryo therapy performed.  Skin care discussed.  Return precautions given.     3. Palpitations  EKG Interpretation-HR is  normal EKG, normal sinus rhythm, unchanged from previous tracings    - EKG - Clinic performed  - HOLTER - Cardiology Performed (48HR); Future    4. Peripheral edema  r/o thyroid dysfuction " especially given the palpitations.  - TSH WITH REFLEX TO FT4; Future

## 2018-06-25 NOTE — TELEPHONE ENCOUNTER
Patient has misplaced the medication. Can you please refill. Thank you     Was the patient seen in the last year in this department? Yes     Does patient have an active prescription for medications requested? Yes     Received Request Via: Patient Mychart

## 2018-06-26 RX ORDER — LISINOPRIL AND HYDROCHLOROTHIAZIDE 20; 12.5 MG/1; MG/1
1 TABLET ORAL 2 TIMES DAILY
Qty: 180 TAB | Refills: 3 | Status: SHIPPED | OUTPATIENT
Start: 2018-06-26 | End: 2019-08-11 | Stop reason: SDUPTHER

## 2018-08-06 ENCOUNTER — TELEPHONE (OUTPATIENT)
Dept: CARDIOLOGY | Facility: MEDICAL CENTER | Age: 53
End: 2018-08-06

## 2018-08-06 ENCOUNTER — NON-PROVIDER VISIT (OUTPATIENT)
Dept: CARDIOLOGY | Facility: MEDICAL CENTER | Age: 53
End: 2018-08-06
Payer: COMMERCIAL

## 2018-08-06 DIAGNOSIS — I49.1 PREMATURE ATRIAL CONTRACTION: ICD-10-CM

## 2018-08-06 DIAGNOSIS — R00.2 PALPITATIONS: ICD-10-CM

## 2018-08-06 DIAGNOSIS — I49.3 PVC (PREMATURE VENTRICULAR CONTRACTION): ICD-10-CM

## 2018-08-08 LAB — EKG IMPRESSION: NORMAL

## 2018-08-08 PROCEDURE — 93224 XTRNL ECG REC UP TO 48 HRS: CPT | Performed by: INTERNAL MEDICINE

## 2018-09-04 ENCOUNTER — HOSPITAL ENCOUNTER (OUTPATIENT)
Dept: LAB | Facility: MEDICAL CENTER | Age: 53
End: 2018-09-04
Attending: FAMILY MEDICINE
Payer: COMMERCIAL

## 2018-09-04 ENCOUNTER — OFFICE VISIT (OUTPATIENT)
Dept: MEDICAL GROUP | Facility: PHYSICIAN GROUP | Age: 53
End: 2018-09-04
Payer: COMMERCIAL

## 2018-09-04 VITALS
WEIGHT: 238.8 LBS | SYSTOLIC BLOOD PRESSURE: 144 MMHG | HEART RATE: 90 BPM | TEMPERATURE: 97.4 F | RESPIRATION RATE: 18 BRPM | DIASTOLIC BLOOD PRESSURE: 80 MMHG | BODY MASS INDEX: 36.19 KG/M2 | HEIGHT: 68 IN | OXYGEN SATURATION: 96 %

## 2018-09-04 DIAGNOSIS — R10.2 PELVIC PAIN: ICD-10-CM

## 2018-09-04 DIAGNOSIS — Z23 NEED FOR VACCINATION: ICD-10-CM

## 2018-09-04 DIAGNOSIS — N53.19 ABNORMAL EJACULATION: ICD-10-CM

## 2018-09-04 DIAGNOSIS — N41.1 CHRONIC PROSTATITIS: ICD-10-CM

## 2018-09-04 LAB
APPEARANCE UR: CLEAR
BILIRUB UR QL STRIP.AUTO: NEGATIVE
COLOR UR: YELLOW
GLUCOSE UR STRIP.AUTO-MCNC: NEGATIVE MG/DL
KETONES UR STRIP.AUTO-MCNC: NEGATIVE MG/DL
LEUKOCYTE ESTERASE UR QL STRIP.AUTO: NEGATIVE
MICRO URNS: NORMAL
NITRITE UR QL STRIP.AUTO: NEGATIVE
PH UR STRIP.AUTO: 5.5 [PH]
PROT UR QL STRIP: NEGATIVE MG/DL
PSA SERPL-MCNC: 1.72 NG/ML (ref 0–4)
RBC UR QL AUTO: NEGATIVE
SP GR UR STRIP.AUTO: 1.03
UROBILINOGEN UR STRIP.AUTO-MCNC: 0.2 MG/DL

## 2018-09-04 PROCEDURE — 81003 URINALYSIS AUTO W/O SCOPE: CPT

## 2018-09-04 PROCEDURE — 90686 IIV4 VACC NO PRSV 0.5 ML IM: CPT | Performed by: FAMILY MEDICINE

## 2018-09-04 PROCEDURE — 90471 IMMUNIZATION ADMIN: CPT | Performed by: FAMILY MEDICINE

## 2018-09-04 PROCEDURE — 84153 ASSAY OF PSA TOTAL: CPT

## 2018-09-04 PROCEDURE — 36415 COLL VENOUS BLD VENIPUNCTURE: CPT

## 2018-09-04 PROCEDURE — 99214 OFFICE O/P EST MOD 30 MIN: CPT | Mod: 25 | Performed by: FAMILY MEDICINE

## 2018-09-04 RX ORDER — CIPROFLOXACIN 250 MG/1
250 TABLET, FILM COATED ORAL 2 TIMES DAILY
Qty: 28 TAB | Refills: 0 | Status: SHIPPED | OUTPATIENT
Start: 2018-09-04 | End: 2018-09-18

## 2018-09-04 NOTE — PROGRESS NOTES
Chief Complaint   Patient presents with   • Labs Only     PSA       HISTORY OF PRESENT ILLNESS: Patient is a 53 y.o. male established patient here today for the following concerns:    Here today with about 1 year of pelvic pressure and difficulty ejaculating.  Reports that there is pressure and very little ejaculate comes out.  No pain specifically, although he reports it can be quite uncomfortable.  No dysuria.  No hematospermia.  No hematuria.  He has not had any fevers or chills.  He is on citalopram for the last several months.   He is due for PSA testing.  No previous hx of prostate cancer or prostatitis.        Past Medical, Social, and Family history reviewed and updated in EPIC    Allergies:Patient has no known allergies.    Current Outpatient Prescriptions   Medication Sig Dispense Refill   • ciprofloxacin (CIPRO) 250 MG Tab Take 1 Tab by mouth 2 times a day for 14 days. 28 Tab 0   • lisinopril-hydrochlorothiazide (PRINZIDE, ZESTORETIC) 20-12.5 MG per tablet Take 1 Tab by mouth 2 times a day. TAKE 1 TAB BY MOUTH 2 TIMES A DAY. 180 Tab 3   • atorvastatin (LIPITOR) 40 MG Tab TAKE 1 TAB BY MOUTH EVERY DAY. 90 Tab 1   • citalopram (CELEXA) 20 MG Tab TAKE 1 TABLET BY MOUTH EVERY DAY 90 Tab 1   • methocarbamol (ROBAXIN-750) 750 MG Tab Take 1 Tab by mouth 3 times a day. 90 Tab 0   • aspirin (ASA) 325 MG TABS Take 325 mg by mouth every day.     • docosahexanoic acid (FISH OIL) 1000 MG CAPS Take 1,000 mg by mouth 2 Times a Day.     • vitamin D (CHOLECALCIFEROL) 1000 UNIT TABS Take 1,000 Units by mouth every day.       No current facility-administered medications for this visit.          ROS:  Review of Systems   Constitutional: Negative for fever, chills, weight loss and malaise/fatigue.   HENT: Negative for ear pain, nosebleeds, congestion, sore throat and neck pain.    Eyes: Negative for blurred vision.   Respiratory: Negative for cough, sputum production, shortness of breath and wheezing.    Cardiovascular:  "Negative for chest pain, palpitations,  and leg swelling.   Gastrointestinal: Negative for heartburn, nausea, vomiting, diarrhea and abdominal pain.   Genitourinary: Negative for dysuria, urgency and frequency.   Musculoskeletal: Negative for myalgias, back pain and joint pain.   Skin: Negative for rash and itching.   Neurological: Negative for dizziness, tingling, tremors, sensory change, focal weakness and headaches.   Endo/Heme/Allergies: Does not bruise/bleed easily.   Psychiatric/Behavioral: Negative for depression, anxiety, suicidal ideas, insomnia and memory loss.      Exam:  Blood pressure 144/80, pulse 90, temperature 36.3 °C (97.4 °F), resp. rate 18, height 1.727 m (5' 8\"), weight 108.3 kg (238 lb 12.8 oz), SpO2 96 %.    General:  Well nourished, well developed in NAD  Head is grossly normal.  Neck: Supple without JVD   Pulmonary:  Normal effort.   Cardiovascular: Regular rate  Extremities: no clubbing, cyanosis, or edema.  Psych: affect appropriate  KINZA: normal rectal tone, prostate mildly enlarged, smooth and non-tender.  Patient does experience urethral discharge on prostatic pressure.      Please note that this dictation was created using voice recognition software. I have made every reasonable attempt to correct obvious errors, but I expect that there are errors of grammar and possibly content that I did not discover before finalizing the note.    Assessment/Plan:  1. Pelvic pain  - PROSTATE SPECIFIC AG SCREENING; Future  - URINALYSIS,CULTURE IF INDICATED; Future    2. Abnormal ejaculation  - PROSTATE SPECIFIC AG SCREENING; Future  - URINALYSIS,CULTURE IF INDICATED; Future    3. Need for vaccination  - INFLUENZA VACCINE QUAD INJ >3Y(PF)    4. Chronic prostatitis  - ciprofloxacin (CIPRO) 250 MG Tab; Take 1 Tab by mouth 2 times a day for 14 days.  Dispense: 28 Tab; Refill: 0  - URINALYSIS,CULTURE IF INDICATED; Future            "

## 2018-09-06 ENCOUNTER — TELEPHONE (OUTPATIENT)
Dept: MEDICAL GROUP | Facility: PHYSICIAN GROUP | Age: 53
End: 2018-09-06

## 2018-09-07 RX ORDER — SULFAMETHOXAZOLE AND TRIMETHOPRIM 800; 160 MG/1; MG/1
1 TABLET ORAL 2 TIMES DAILY
Qty: 20 TAB | Refills: 0 | Status: SHIPPED | OUTPATIENT
Start: 2018-09-07 | End: 2018-09-17

## 2018-09-07 NOTE — TELEPHONE ENCOUNTER
Received a fax from [facility].  They state that there is a severe drug to drug interaction between the Cipro and Citalopram.  The use of Citalopram in patients maintained on agents that prolong the QTc interval may result in potentially life-threatening cardia arrhythmias, including torsades de pointes.  Please advise.  Thank you.  P:[phone]  F:[phone]

## 2018-09-20 NOTE — TELEPHONE ENCOUNTER
Was the patient seen in the last year in this department? Yes    Does patient have an active prescription for medications requested? No     Received Request Via: Pharmacy    Pt met protocol?: Yes     Last OV 09/2018    No upcoming appointment

## 2018-09-21 RX ORDER — CITALOPRAM 20 MG/1
TABLET ORAL
Qty: 90 TAB | Refills: 1 | Status: SHIPPED | OUTPATIENT
Start: 2018-09-21 | End: 2019-04-07 | Stop reason: SDUPTHER

## 2018-12-12 DIAGNOSIS — E78.5 HYPERLIPIDEMIA, UNSPECIFIED HYPERLIPIDEMIA TYPE: ICD-10-CM

## 2018-12-13 RX ORDER — ATORVASTATIN CALCIUM 40 MG/1
40 TABLET, FILM COATED ORAL DAILY
Qty: 90 TAB | Refills: 0 | Status: SHIPPED | OUTPATIENT
Start: 2018-12-13 | End: 2019-03-13 | Stop reason: SDUPTHER

## 2018-12-13 NOTE — TELEPHONE ENCOUNTER
Was the patient seen in the last year in this department? Yes    Does patient have an active prescription for medications requested? No     Received Request Via: Pharmacy      Pt met protocol?: Yes, last ov 9/4/18    Lab Results  Component Value Date/Time   CHOLSTRLTOT 130 01/04/2018 0610   TRIGLYCERIDE 161 (H) 01/04/2018 0610   HDL 52 01/04/2018 0610   LDL 46 01/04/2018 0610

## 2019-03-13 DIAGNOSIS — E78.5 HYPERLIPIDEMIA, UNSPECIFIED HYPERLIPIDEMIA TYPE: ICD-10-CM

## 2019-03-13 DIAGNOSIS — E78.5 DYSLIPIDEMIA: ICD-10-CM

## 2019-03-13 DIAGNOSIS — I10 ESSENTIAL HYPERTENSION: ICD-10-CM

## 2019-03-13 RX ORDER — ATORVASTATIN CALCIUM 40 MG/1
TABLET, FILM COATED ORAL
Qty: 90 TAB | Refills: 0 | Status: SHIPPED | OUTPATIENT
Start: 2019-03-13 | End: 2019-06-18 | Stop reason: SDUPTHER

## 2019-03-13 NOTE — TELEPHONE ENCOUNTER
Was the patient seen in the last year in this department? Yes    Does patient have an active prescription for medications requested? No     Received Request Via: Pharmacy      Pt met protocol?: Yes, lipid labs 1/18, ov pcp 11/18 trigl up

## 2019-03-26 RX ORDER — IBUPROFEN 800 MG/1
TABLET ORAL
Qty: 30 TAB | Refills: 1 | Status: SHIPPED | OUTPATIENT
Start: 2019-03-26 | End: 2020-06-11

## 2019-04-08 NOTE — TELEPHONE ENCOUNTER
Was the patient seen in the last year in this department? Yes    Does patient have an active prescription for medications requested? No     Received Request Via: Pharmacy    Pt met protocol?: Yes     Last OV 09/04/2018

## 2019-04-09 RX ORDER — CITALOPRAM 20 MG/1
TABLET ORAL
Qty: 90 TAB | Refills: 0 | Status: SHIPPED | OUTPATIENT
Start: 2019-04-09 | End: 2019-07-11 | Stop reason: SDUPTHER

## 2019-04-23 ENCOUNTER — HOSPITAL ENCOUNTER (OUTPATIENT)
Dept: LAB | Facility: MEDICAL CENTER | Age: 54
End: 2019-04-23
Attending: FAMILY MEDICINE
Payer: COMMERCIAL

## 2019-04-23 ENCOUNTER — OFFICE VISIT (OUTPATIENT)
Dept: MEDICAL GROUP | Facility: PHYSICIAN GROUP | Age: 54
End: 2019-04-23
Payer: COMMERCIAL

## 2019-04-23 VITALS
BODY MASS INDEX: 36.22 KG/M2 | WEIGHT: 239 LBS | HEART RATE: 78 BPM | OXYGEN SATURATION: 97 % | HEIGHT: 68 IN | SYSTOLIC BLOOD PRESSURE: 138 MMHG | TEMPERATURE: 97.4 F | DIASTOLIC BLOOD PRESSURE: 90 MMHG | RESPIRATION RATE: 16 BRPM

## 2019-04-23 DIAGNOSIS — Z12.5 SCREENING FOR PROSTATE CANCER: ICD-10-CM

## 2019-04-23 DIAGNOSIS — R31.9 HEMATURIA, UNSPECIFIED TYPE: ICD-10-CM

## 2019-04-23 DIAGNOSIS — R10.2 SUPRAPUBIC ABDOMINAL PAIN: ICD-10-CM

## 2019-04-23 DIAGNOSIS — G89.29 CHRONIC MIDLINE LOW BACK PAIN, WITH SCIATICA PRESENCE UNSPECIFIED: ICD-10-CM

## 2019-04-23 DIAGNOSIS — G89.29 CHRONIC LEFT SHOULDER PAIN: ICD-10-CM

## 2019-04-23 DIAGNOSIS — M25.512 CHRONIC LEFT SHOULDER PAIN: ICD-10-CM

## 2019-04-23 DIAGNOSIS — M54.5 CHRONIC MIDLINE LOW BACK PAIN, WITH SCIATICA PRESENCE UNSPECIFIED: ICD-10-CM

## 2019-04-23 DIAGNOSIS — M54.2 NECK PAIN: ICD-10-CM

## 2019-04-23 DIAGNOSIS — Z13.0 SCREENING FOR DEFICIENCY ANEMIA: ICD-10-CM

## 2019-04-23 DIAGNOSIS — Z13.6 SCREENING FOR CARDIOVASCULAR CONDITION: ICD-10-CM

## 2019-04-23 PROBLEM — M54.50 CHRONIC MIDLINE LOW BACK PAIN: Status: ACTIVE | Noted: 2019-04-23

## 2019-04-23 LAB
ALBUMIN SERPL BCP-MCNC: 4.4 G/DL (ref 3.2–4.9)
ALBUMIN/GLOB SERPL: 1.5 G/DL
ALP SERPL-CCNC: 94 U/L (ref 30–99)
ALT SERPL-CCNC: 32 U/L (ref 2–50)
ANION GAP SERPL CALC-SCNC: 8 MMOL/L (ref 0–11.9)
APPEARANCE UR: CLEAR
AST SERPL-CCNC: 23 U/L (ref 12–45)
BASOPHILS # BLD AUTO: 0.5 % (ref 0–1.8)
BASOPHILS # BLD: 0.02 K/UL (ref 0–0.12)
BILIRUB SERPL-MCNC: 1.3 MG/DL (ref 0.1–1.5)
BILIRUB UR STRIP-MCNC: NEGATIVE MG/DL
BUN SERPL-MCNC: 16 MG/DL (ref 8–22)
CALCIUM SERPL-MCNC: 9.6 MG/DL (ref 8.5–10.5)
CHLORIDE SERPL-SCNC: 102 MMOL/L (ref 96–112)
CHOLEST SERPL-MCNC: 140 MG/DL (ref 100–199)
CO2 SERPL-SCNC: 29 MMOL/L (ref 20–33)
COLOR UR AUTO: YELLOW
CREAT SERPL-MCNC: 1.02 MG/DL (ref 0.5–1.4)
EOSINOPHIL # BLD AUTO: 0.11 K/UL (ref 0–0.51)
EOSINOPHIL NFR BLD: 2.6 % (ref 0–6.9)
ERYTHROCYTE [DISTWIDTH] IN BLOOD BY AUTOMATED COUNT: 41.1 FL (ref 35.9–50)
FASTING STATUS PATIENT QL REPORTED: NORMAL
GLOBULIN SER CALC-MCNC: 3 G/DL (ref 1.9–3.5)
GLUCOSE SERPL-MCNC: 101 MG/DL (ref 65–99)
GLUCOSE UR STRIP.AUTO-MCNC: NEGATIVE MG/DL
HCT VFR BLD AUTO: 46.2 % (ref 42–52)
HDLC SERPL-MCNC: 45 MG/DL
HGB BLD-MCNC: 15.2 G/DL (ref 14–18)
IMM GRANULOCYTES # BLD AUTO: 0 K/UL (ref 0–0.11)
IMM GRANULOCYTES NFR BLD AUTO: 0 % (ref 0–0.9)
KETONES UR STRIP.AUTO-MCNC: NEGATIVE MG/DL
LDLC SERPL CALC-MCNC: 61 MG/DL
LEUKOCYTE ESTERASE UR QL STRIP.AUTO: NEGATIVE
LYMPHOCYTES # BLD AUTO: 1.42 K/UL (ref 1–4.8)
LYMPHOCYTES NFR BLD: 34 % (ref 22–41)
MCH RBC QN AUTO: 29.5 PG (ref 27–33)
MCHC RBC AUTO-ENTMCNC: 32.9 G/DL (ref 33.7–35.3)
MCV RBC AUTO: 89.5 FL (ref 81.4–97.8)
MONOCYTES # BLD AUTO: 0.46 K/UL (ref 0–0.85)
MONOCYTES NFR BLD AUTO: 11 % (ref 0–13.4)
NEUTROPHILS # BLD AUTO: 2.17 K/UL (ref 1.82–7.42)
NEUTROPHILS NFR BLD: 51.9 % (ref 44–72)
NITRITE UR QL STRIP.AUTO: NEGATIVE
NRBC # BLD AUTO: 0.04 K/UL
NRBC BLD-RTO: 1 /100 WBC
PH UR STRIP.AUTO: 7 [PH] (ref 5–8)
PLATELET # BLD AUTO: 247 K/UL (ref 164–446)
PMV BLD AUTO: 10.6 FL (ref 9–12.9)
POTASSIUM SERPL-SCNC: 3.9 MMOL/L (ref 3.6–5.5)
PROT SERPL-MCNC: 7.4 G/DL (ref 6–8.2)
PROT UR QL STRIP: NEGATIVE MG/DL
PSA SERPL-MCNC: 1.53 NG/ML (ref 0–4)
RBC # BLD AUTO: 5.16 M/UL (ref 4.7–6.1)
RBC UR QL AUTO: NEGATIVE
SODIUM SERPL-SCNC: 139 MMOL/L (ref 135–145)
SP GR UR STRIP.AUTO: 1.01
TRIGL SERPL-MCNC: 169 MG/DL (ref 0–149)
UROBILINOGEN UR STRIP-MCNC: 0.2 MG/DL
WBC # BLD AUTO: 4.2 K/UL (ref 4.8–10.8)

## 2019-04-23 PROCEDURE — 36415 COLL VENOUS BLD VENIPUNCTURE: CPT

## 2019-04-23 PROCEDURE — 80061 LIPID PANEL: CPT

## 2019-04-23 PROCEDURE — 80053 COMPREHEN METABOLIC PANEL: CPT

## 2019-04-23 PROCEDURE — 81002 URINALYSIS NONAUTO W/O SCOPE: CPT | Performed by: FAMILY MEDICINE

## 2019-04-23 PROCEDURE — 84153 ASSAY OF PSA TOTAL: CPT

## 2019-04-23 PROCEDURE — 85025 COMPLETE CBC W/AUTO DIFF WBC: CPT

## 2019-04-23 PROCEDURE — 99214 OFFICE O/P EST MOD 30 MIN: CPT | Performed by: FAMILY MEDICINE

## 2019-04-23 ASSESSMENT — PATIENT HEALTH QUESTIONNAIRE - PHQ9
8. MOVING OR SPEAKING SO SLOWLY THAT OTHER PEOPLE COULD HAVE NOTICED. OR THE OPPOSITE, BEING SO FIGETY OR RESTLESS THAT YOU HAVE BEEN MOVING AROUND A LOT MORE THAN USUAL: NOT AT ALL
SUM OF ALL RESPONSES TO PHQ QUESTIONS 1-9: 3
4. FEELING TIRED OR HAVING LITTLE ENERGY: NOT AT ALL
2. FEELING DOWN, DEPRESSED, IRRITABLE, OR HOPELESS: NOT AT ALL
5. POOR APPETITE OR OVEREATING: NOT AT ALL
1. LITTLE INTEREST OR PLEASURE IN DOING THINGS: NOT AT ALL
SUM OF ALL RESPONSES TO PHQ9 QUESTIONS 1 AND 2: 0
3. TROUBLE FALLING OR STAYING ASLEEP OR SLEEPING TOO MUCH: NEARLY EVERY DAY
7. TROUBLE CONCENTRATING ON THINGS, SUCH AS READING THE NEWSPAPER OR WATCHING TELEVISION: NOT AT ALL
6. FEELING BAD ABOUT YOURSELF - OR THAT YOU ARE A FAILURE OR HAVE LET YOURSELF OR YOUR FAMILY DOWN: NOT AL ALL
9. THOUGHTS THAT YOU WOULD BE BETTER OFF DEAD, OR OF HURTING YOURSELF: NOT AT ALL

## 2019-04-23 NOTE — ASSESSMENT & PLAN NOTE
Reports about 1 month ago had some urinary hesitancy and pressure like sensation and began passing gross hematuria, it resolved after that one void.  Urine stream is nearly back to normal, without that sensation of being blocked.  Still having some periumbilical discomfort.  Not influenced by urination.  No fevers or chills.  No hematospermia.  No flank pains.

## 2019-04-23 NOTE — PROGRESS NOTES
Chief Complaint   Patient presents with   • Abdominal Pain     x 2 months   • Blood in Urine       HISTORY OF PRESENT ILLNESS: Patient is a 54 y.o. male established patient here today for the following concerns:      Suprapubic abdominal pain  Hematuria  Reports about 1 month ago had some urinary hesitancy and pressure like sensation and began passing gross hematuria, it resolved after that one void.  Urine stream is nearly back to normal, without that sensation of being blocked.  Still having some periumbilical discomfort.  Not influenced by urination.  No fevers or chills.  No hematospermia.  No flank pains.      2. Chronic midline low back pain, with sciatica presence unspecified  3. Neck pain  4. Chronic left shoulder pain  Reports over the last few months, noting some more aches and pains and stiffness primarily affecting the left shoulder, neck and low back.  In the past these things have been greatly helped with PT.  Would like to have referral.  Denies any new accidents or injuries.  Reports no new sports.  Reports that the pain is described as aching in all of these joints without radiation of pain.  Has been having some crackling and popping of the right wrist, but without pain or swelling.  This is the site of former distal radius fracture.     5. Screening for cardiovascular condition  6. Screening for prostate cancer  7. Screening for deficiency anemia  Due for labs as well.          Past Medical, Social, and Family history reviewed and updated in EPIC    Allergies:Patient has no known allergies.    Current Outpatient Prescriptions   Medication Sig Dispense Refill   • citalopram (CELEXA) 20 MG Tab TAKE 1 TABLET BY MOUTH EVERY DAY 90 Tab 0   • ibuprofen (MOTRIN) 800 MG Tab TAKE 1 TAB BY MOUTH EVERY 8 HOURS AS NEEDED. FOR MILD PAIN 30 Tab 1   • atorvastatin (LIPITOR) 40 MG Tab TAKE 1 TABLET BY MOUTH EVERY DAY 90 Tab 0   • lisinopril-hydrochlorothiazide (PRINZIDE, ZESTORETIC) 20-12.5 MG per tablet Take 1  "Tab by mouth 2 times a day. TAKE 1 TAB BY MOUTH 2 TIMES A DAY. 180 Tab 3   • aspirin (ASA) 325 MG TABS Take 325 mg by mouth every day.     • docosahexanoic acid (FISH OIL) 1000 MG CAPS Take 1,000 mg by mouth 2 Times a Day.     • vitamin D (CHOLECALCIFEROL) 1000 UNIT TABS Take 1,000 Units by mouth every day.     • methocarbamol (ROBAXIN-750) 750 MG Tab Take 1 Tab by mouth 3 times a day. (Patient not taking: Reported on 4/23/2019) 90 Tab 0     No current facility-administered medications for this visit.          ROS:  Review of Systems   Constitutional: Negative for fever, chills, weight loss and malaise/fatigue.   HENT: Negative for ear pain, nosebleeds, congestion, sore throat and neck pain.    Eyes: Negative for blurred vision.   Respiratory: Negative for cough, sputum production, shortness of breath and wheezing.    Cardiovascular: Negative for chest pain, palpitations,  and leg swelling.   Gastrointestinal: Negative for heartburn, nausea, vomiting, diarrhea and abdominal pain.   Genitourinary: + for dysuria, urgency and frequency.   Musculoskeletal: Negative for myalgias, +back pain and joint pain.   Skin: Negative for rash and itching.   Neurological: Negative for dizziness, tingling, tremors, sensory change, focal weakness and headaches.   Endo/Heme/Allergies: Does not bruise/bleed easily.   Psychiatric/Behavioral: Negative for depression, anxiety, suicidal ideas, insomnia and memory loss.      Exam:  /90   Pulse 78   Temp 36.3 °C (97.4 °F)   Resp 16   Ht 1.727 m (5' 8\")   Wt 108.4 kg (239 lb)   SpO2 97%     General:  Well nourished, well developed in NAD  Head is grossly normal.  Neck: Supple without JVD   Pulmonary:  Normal effort.   Cardiovascular: Regular rate  Extremities: no clubbing, cyanosis, or edema.  Psych: affect appropriate  MSK: Full ROM in the lumbar spine,  No leg weakness.  Neck with some limited rotation, normal flexion and extension.  No weakness in the arms.  Left shoulder " without any weakness on empty can testing.      Please note that this dictation was created using voice recognition software. I have made every reasonable attempt to correct obvious errors, but I expect that there are errors of grammar and possibly content that I did not discover before finalizing the note.    Assessment/Plan:  1. Hematuria, unspecified type  UA clear today, will obtain US to r/o obstruction given still with suprapubic discomfort and slow emptying   - POCT Urinalysis  - US-RENAL; Future    2. Chronic midline low back pain, with sciatica presence unspecified  Suspect facet/DDD  - REFERRAL TO PHYSICAL THERAPY Reason for Therapy: Eval/Treat/Report    3. Neck pain  Suspect DDD, facet arthropathy,   - REFERRAL TO PHYSICAL THERAPY Reason for Therapy: Eval/Treat/Report    4. 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 Screening for cardiovascular condition  - Comp Metabolic Panel; Future  - Lipid Profile; Future    6. Screening for prostate cancer  - PROSTATE SPECIFIC AG SCREENING; Future    7. Screening for deficiency anemia  - CBC WITH DIFFERENTIAL; Future    8. Suprapubic abdominal pain  Will check   - US-RENAL; Future    Follow up in 4 weeks for labs and US review.

## 2019-04-26 DIAGNOSIS — B35.6 TINEA CRURIS: ICD-10-CM

## 2019-04-26 NOTE — TELEPHONE ENCOUNTER
Was the patient seen in the last year in this department? Yes    Does patient have an active prescription for medications requested? No     Received Request Via: Pharmacy      Pt met protocol?: Yes    LAST OV 04/23/2019

## 2019-04-26 NOTE — TELEPHONE ENCOUNTER
Was the patient seen in the last year in this department? Yes    Does patient have an active prescription for medications requested? No     Received Request Via: Patient       Hi Dr Hendrix,     Can I get another tube for a rash in my groin area?     Thanks Ed

## 2019-04-30 ENCOUNTER — HOSPITAL ENCOUNTER (OUTPATIENT)
Dept: RADIOLOGY | Facility: MEDICAL CENTER | Age: 54
End: 2019-04-30
Attending: FAMILY MEDICINE
Payer: COMMERCIAL

## 2019-04-30 DIAGNOSIS — R31.9 HEMATURIA, UNSPECIFIED TYPE: ICD-10-CM

## 2019-04-30 DIAGNOSIS — R10.2 SUPRAPUBIC ABDOMINAL PAIN: ICD-10-CM

## 2019-04-30 PROCEDURE — 76775 US EXAM ABDO BACK WALL LIM: CPT

## 2019-04-30 RX ORDER — CLOTRIMAZOLE AND BETAMETHASONE DIPROPIONATE 10; .64 MG/G; MG/G
1 CREAM TOPICAL 2 TIMES DAILY
Qty: 1 TUBE | Refills: 3 | Status: SHIPPED | OUTPATIENT
Start: 2019-04-30 | End: 2020-03-11

## 2019-06-18 DIAGNOSIS — E78.5 HYPERLIPIDEMIA, UNSPECIFIED HYPERLIPIDEMIA TYPE: ICD-10-CM

## 2019-06-19 RX ORDER — ATORVASTATIN CALCIUM 40 MG/1
TABLET, FILM COATED ORAL
Qty: 90 TAB | Refills: 3 | Status: SHIPPED | OUTPATIENT
Start: 2019-06-19 | End: 2020-06-11 | Stop reason: SDUPTHER

## 2019-06-19 NOTE — TELEPHONE ENCOUNTER
Pt has had OV within the 12 month protocol and lipid panel is current. 12 month supply sent to pharmacy.   Lab Results   Component Value Date/Time    CHOLSTRLTOT 140 04/23/2019 07:51 AM    LDL 61 04/23/2019 07:51 AM    HDL 45 04/23/2019 07:51 AM    TRIGLYCERIDE 169 (H) 04/23/2019 07:51 AM       Lab Results   Component Value Date/Time    SODIUM 139 04/23/2019 07:51 AM    POTASSIUM 3.9 04/23/2019 07:51 AM    CHLORIDE 102 04/23/2019 07:51 AM    CO2 29 04/23/2019 07:51 AM    GLUCOSE 101 (H) 04/23/2019 07:51 AM    BUN 16 04/23/2019 07:51 AM    CREATININE 1.02 04/23/2019 07:51 AM    CREATININE 1.09 01/03/2011 08:40 AM    BUNCREATRAT 15 01/03/2011 08:40 AM    GLOMRATE >59 01/03/2011 08:40 AM     Lab Results   Component Value Date/Time    ALKPHOSPHAT 94 04/23/2019 07:51 AM    ASTSGOT 23 04/23/2019 07:51 AM    ALTSGPT 32 04/23/2019 07:51 AM    TBILIRUBIN 1.3 04/23/2019 07:51 AM

## 2019-06-19 NOTE — TELEPHONE ENCOUNTER
Was the patient seen in the last year in this department? Yes    Does patient have an active prescription for medications requested? No     Received Request Via: Pharmacy      Pt met protocol?: Yes   Pt last ov 4/19   Lab Results  Component Value Date/Time   CHOLSTRLTOT 140 04/23/2019 0751   TRIGLYCERIDE 169 (H) 04/23/2019 0751   HDL 45 04/23/2019 0751   LDL 61 04/23/2019 0751

## 2019-06-25 ENCOUNTER — OFFICE VISIT (OUTPATIENT)
Dept: MEDICAL GROUP | Facility: PHYSICIAN GROUP | Age: 54
End: 2019-06-25
Payer: COMMERCIAL

## 2019-06-25 VITALS
DIASTOLIC BLOOD PRESSURE: 88 MMHG | HEART RATE: 80 BPM | RESPIRATION RATE: 16 BRPM | HEIGHT: 68 IN | OXYGEN SATURATION: 92 % | SYSTOLIC BLOOD PRESSURE: 140 MMHG | TEMPERATURE: 98 F | BODY MASS INDEX: 37.22 KG/M2 | WEIGHT: 245.6 LBS

## 2019-06-25 DIAGNOSIS — R73.01 IMPAIRED FASTING GLUCOSE: ICD-10-CM

## 2019-06-25 DIAGNOSIS — N40.1 BENIGN PROSTATIC HYPERPLASIA WITH LOWER URINARY TRACT SYMPTOMS, SYMPTOM DETAILS UNSPECIFIED: ICD-10-CM

## 2019-06-25 DIAGNOSIS — E66.9 OBESITY (BMI 35.0-39.9 WITHOUT COMORBIDITY): ICD-10-CM

## 2019-06-25 DIAGNOSIS — I10 ESSENTIAL HYPERTENSION: ICD-10-CM

## 2019-06-25 DIAGNOSIS — E78.2 MIXED HYPERLIPIDEMIA: ICD-10-CM

## 2019-06-25 PROCEDURE — 99214 OFFICE O/P EST MOD 30 MIN: CPT | Performed by: FAMILY MEDICINE

## 2019-06-25 RX ORDER — TAMSULOSIN HYDROCHLORIDE 0.4 MG/1
0.4 CAPSULE ORAL
Qty: 90 CAP | Refills: 3 | Status: SHIPPED | OUTPATIENT
Start: 2019-06-25 | End: 2020-06-11 | Stop reason: SDUPTHER

## 2019-06-25 RX ORDER — FINASTERIDE 5 MG/1
5 TABLET, FILM COATED ORAL DAILY
Qty: 90 TAB | Refills: 3 | Status: SHIPPED | OUTPATIENT
Start: 2019-06-25 | End: 2020-06-11 | Stop reason: SDUPTHER

## 2019-06-25 NOTE — PROGRESS NOTES
Chief Complaint   Patient presents with   • Hyperlipidemia     fv labs       HISTORY OF PRESENT ILLNESS: Patient is a 54 y.o. male established patient here today for the following concerns:    1. Benign prostatic hyperplasia with lower urinary tract symptoms, symptom details unspecified  Here today for follow up on US of the KUB.  Found to have prostate enlargement with symptoms of frequency.  No Hematuria.  No pelvic pains.  PSA has remained stable.     2. Mixed hyperlipidemia  Continues on statin therapy with atorvastatin without myalgias.  No CP.      3. Essential hypertension  Continues on lisinopril-HCTZ.  Doing well.  Occasional orthostasis.  No CP or palpitations.  Lytes normal.     4. Impaired fasting glucose  Noted on labs fasting sugar of 101.  No polyuria or polydipsia.  No neuropathy.       5. Obesity (BMI 35.0-39.9 without comorbidity)  Has been trying to work on weight since returning home, but had visited mom who fed him really well.  Exercising regulaly.       Past Medical, Social, and Family history reviewed and updated in EPIC    Allergies:Patient has no known allergies.    Current Outpatient Prescriptions   Medication Sig Dispense Refill   • tamsulosin (FLOMAX) 0.4 MG capsule Take 1 Cap by mouth ONE-HALF HOUR AFTER BREAKFAST. 90 Cap 3   • finasteride (PROSCAR) 5 MG Tab Take 1 Tab by mouth every day. 90 Tab 3   • atorvastatin (LIPITOR) 40 MG Tab TAKE 1 TABLET BY MOUTH EVERY DAY 90 Tab 3   • clotrimazole-betamethasone (LOTRISONE) 1-0.05 % Cream Apply 1 Application to affected area(s) 2 times a day. 1 Tube 3   • citalopram (CELEXA) 20 MG Tab TAKE 1 TABLET BY MOUTH EVERY DAY 90 Tab 0   • lisinopril-hydrochlorothiazide (PRINZIDE, ZESTORETIC) 20-12.5 MG per tablet Take 1 Tab by mouth 2 times a day. TAKE 1 TAB BY MOUTH 2 TIMES A DAY. 180 Tab 3   • aspirin (ASA) 325 MG TABS Take 325 mg by mouth every day.     • docosahexanoic acid (FISH OIL) 1000 MG CAPS Take 1,000 mg by mouth 2 Times a Day.     • vitamin  "D (CHOLECALCIFEROL) 1000 UNIT TABS Take 1,000 Units by mouth every day.     • ibuprofen (MOTRIN) 800 MG Tab TAKE 1 TAB BY MOUTH EVERY 8 HOURS AS NEEDED. FOR MILD PAIN 30 Tab 1     No current facility-administered medications for this visit.          ROS:  Review of Systems   Constitutional: Negative for fever, chills, weight loss and malaise/fatigue.   HENT: Negative for ear pain, nosebleeds, congestion, sore throat and neck pain.    Eyes: Negative for blurred vision.   Respiratory: Negative for cough, sputum production, shortness of breath and wheezing.    Cardiovascular: Negative for chest pain, palpitations,  and leg swelling.   Gastrointestinal: Negative for heartburn, nausea, vomiting, diarrhea and abdominal pain.   Genitourinary: Negative for dysuria, urgency and frequency.   Musculoskeletal: Negative for myalgias, back pain and joint pain.   Skin: Negative for rash and itching.   Neurological: Negative for dizziness, tingling, tremors, sensory change, focal weakness and headaches.   Endo/Heme/Allergies: Does not bruise/bleed easily.   Psychiatric/Behavioral: Negative for depression, anxiety, suicidal ideas, insomnia and memory loss.      Exam:  /88   Pulse 80   Temp 36.7 °C (98 °F)   Resp 16   Ht 1.727 m (5' 8\")   Wt 111.4 kg (245 lb 9.6 oz)   SpO2 92%     General:  Well nourished, well developed in NAD  Head is grossly normal.  Neck: Supple without JVD   Pulmonary:  Normal effort.   Cardiovascular: Regular rate  Extremities: no clubbing, cyanosis, or edema.  Psych: affect appropriate      Please note that this dictation was created using voice recognition software. I have made every reasonable attempt to correct obvious errors, but I expect that there are errors of grammar and possibly content that I did not discover before finalizing the note.    Assessment/Plan:  1. Benign prostatic hyperplasia with lower urinary tract symptoms, symptom details unspecified  Start   - tamsulosin (FLOMAX) 0.4 MG " capsule; Take 1 Cap by mouth ONE-HALF HOUR AFTER BREAKFAST.  Dispense: 90 Cap; Refill: 3  - finasteride (PROSCAR) 5 MG Tab; Take 1 Tab by mouth every day.  Dispense: 90 Tab; Refill: 3  Offered referral to urology if desires, will try medication at first.     2. Mixed hyperlipidemia  Continue atrovastatin   - Comp Metabolic Panel; Future  - Lipid Profile; Future    3. Essential hypertension  Continue lisinopril-HCTZ, discussed monitoring for worsening orthostasis.  Hydration.    - Comp Metabolic Panel; Future  - Lipid Profile; Future    4. Impaired fasting glucose  Weight reduction, increased fiber.   - HEMOGLOBIN A1C; Future    5. Obesity (BMI 35.0-39.9 without comorbidity)  - Patient identified as having weight management issue.  Appropriate orders and counseling given.    Follow up in 6  Months, sooner prn.

## 2019-07-12 RX ORDER — CITALOPRAM 20 MG/1
TABLET ORAL
Qty: 90 TAB | Refills: 1 | Status: SHIPPED | OUTPATIENT
Start: 2019-07-12 | End: 2020-03-11 | Stop reason: SDUPTHER

## 2019-08-12 NOTE — TELEPHONE ENCOUNTER
Was the patient seen in the last year in this department? Yes    Does patient have an active prescription for medications requested? No     Received Request Via: Pharmacy      Pt met protocol?: Yes   Pt last ov 6/2019   BP Readings from Last 1 Encounters:   06/25/19 140/88

## 2019-08-13 RX ORDER — LISINOPRIL AND HYDROCHLOROTHIAZIDE 20; 12.5 MG/1; MG/1
1 TABLET ORAL 2 TIMES DAILY
Qty: 180 TAB | Refills: 1 | Status: SHIPPED | OUTPATIENT
Start: 2019-08-13 | End: 2019-08-28

## 2019-08-13 NOTE — TELEPHONE ENCOUNTER
Refill X 6 months, sent to pharmacy.Pt. Seen in the last 6 months per protocol.   Lab Results   Component Value Date/Time    SODIUM 139 04/23/2019 07:51 AM    POTASSIUM 3.9 04/23/2019 07:51 AM    CHLORIDE 102 04/23/2019 07:51 AM    CO2 29 04/23/2019 07:51 AM    GLUCOSE 101 (H) 04/23/2019 07:51 AM    BUN 16 04/23/2019 07:51 AM    CREATININE 1.02 04/23/2019 07:51 AM    CREATININE 1.09 01/03/2011 08:40 AM    BUNCREATRAT 15 01/03/2011 08:40 AM    GLOMRATE >59 01/03/2011 08:40 AM

## 2019-08-21 ENCOUNTER — HOSPITAL ENCOUNTER (OUTPATIENT)
Dept: RADIOLOGY | Facility: MEDICAL CENTER | Age: 54
End: 2019-08-21
Attending: CHIROPRACTOR
Payer: COMMERCIAL

## 2019-08-21 DIAGNOSIS — M99.02 THORACIC SEGMENT DYSFUNCTION: ICD-10-CM

## 2019-08-21 DIAGNOSIS — M25.512 LEFT SHOULDER PAIN, UNSPECIFIED CHRONICITY: ICD-10-CM

## 2019-08-21 DIAGNOSIS — M99.04 SOMATIC DYSFUNCTION OF SACRAL REGION: ICD-10-CM

## 2019-08-21 DIAGNOSIS — M99.03 SOMATIC DYSFUNCTION OF LUMBAR REGION: ICD-10-CM

## 2019-08-21 DIAGNOSIS — M99.01 CERVICAL SEGMENT DYSFUNCTION: ICD-10-CM

## 2019-08-21 PROCEDURE — 72040 X-RAY EXAM NECK SPINE 2-3 VW: CPT

## 2019-08-21 PROCEDURE — 72072 X-RAY EXAM THORAC SPINE 3VWS: CPT

## 2019-08-21 PROCEDURE — 72170 X-RAY EXAM OF PELVIS: CPT

## 2019-08-21 PROCEDURE — 73030 X-RAY EXAM OF SHOULDER: CPT | Mod: LT

## 2019-08-21 PROCEDURE — 72100 X-RAY EXAM L-S SPINE 2/3 VWS: CPT

## 2019-08-28 ENCOUNTER — OFFICE VISIT (OUTPATIENT)
Dept: MEDICAL GROUP | Facility: PHYSICIAN GROUP | Age: 54
End: 2019-08-28
Payer: COMMERCIAL

## 2019-08-28 VITALS
BODY MASS INDEX: 36.07 KG/M2 | HEIGHT: 68 IN | TEMPERATURE: 98.7 F | RESPIRATION RATE: 18 BRPM | OXYGEN SATURATION: 97 % | SYSTOLIC BLOOD PRESSURE: 120 MMHG | DIASTOLIC BLOOD PRESSURE: 84 MMHG | WEIGHT: 238 LBS | HEART RATE: 82 BPM

## 2019-08-28 DIAGNOSIS — R05.8 ACE-INHIBITOR COUGH: ICD-10-CM

## 2019-08-28 DIAGNOSIS — I95.1 ORTHOSTASIS: ICD-10-CM

## 2019-08-28 DIAGNOSIS — T46.4X5A ACE-INHIBITOR COUGH: ICD-10-CM

## 2019-08-28 PROCEDURE — 99214 OFFICE O/P EST MOD 30 MIN: CPT | Performed by: FAMILY MEDICINE

## 2019-08-28 RX ORDER — OLMESARTAN MEDOXOMIL 40 MG/1
20-40 TABLET ORAL DAILY
Qty: 90 TAB | Refills: 3 | Status: SHIPPED | OUTPATIENT
Start: 2019-08-28 | End: 2019-10-01

## 2019-08-28 NOTE — PROGRESS NOTES
"Chief Complaint   Patient presents with   • Dizziness     x \"3 weeks\"       HISTORY OF PRESENT ILLNESS: Patient is a 54 y.o. male established patient here today for the following concerns:    1. Orthostasis  2. ACE-inhibitor cough  Here for dizziness over the last few weeks characterized by light headedness and graying out of his vision, presyncopal sensation typically brought on by change in position, dehydration or even a hot shower.  He reports he has also developed a dry tickling near cough or throat clearing sensation without any fevers, chills, no nasal congestion or PND.        Past Medical, Social, and Family history reviewed and updated in EPIC    Allergies:Patient has no known allergies.    Current Outpatient Medications   Medication Sig Dispense Refill   • olmesartan (BENICAR) 40 MG Tab Take 0.5-1 Tabs by mouth every day for 90 days. Start with 1/2 tab daily for 2 weeks, if BP is >140/90 start taking full tab daily 90 Tab 3   • citalopram (CELEXA) 20 MG Tab TAKE 1 TABLET BY MOUTH EVERY DAY 90 Tab 1   • tamsulosin (FLOMAX) 0.4 MG capsule Take 1 Cap by mouth ONE-HALF HOUR AFTER BREAKFAST. 90 Cap 3   • finasteride (PROSCAR) 5 MG Tab Take 1 Tab by mouth every day. 90 Tab 3   • atorvastatin (LIPITOR) 40 MG Tab TAKE 1 TABLET BY MOUTH EVERY DAY 90 Tab 3   • clotrimazole-betamethasone (LOTRISONE) 1-0.05 % Cream Apply 1 Application to affected area(s) 2 times a day. 1 Tube 3   • ibuprofen (MOTRIN) 800 MG Tab TAKE 1 TAB BY MOUTH EVERY 8 HOURS AS NEEDED. FOR MILD PAIN 30 Tab 1   • aspirin (ASA) 325 MG TABS Take 325 mg by mouth every day.     • docosahexanoic acid (FISH OIL) 1000 MG CAPS Take 1,000 mg by mouth 2 Times a Day.     • vitamin D (CHOLECALCIFEROL) 1000 UNIT TABS Take 1,000 Units by mouth every day.       No current facility-administered medications for this visit.          ROS:  Review of Systems   Constitutional: Negative for fever, chills, weight loss and malaise/fatigue.   HENT: Negative for ear pain, " "nosebleeds, congestion, sore throat and neck pain.    Eyes: Negative for blurred vision.   Respiratory: Negative for cough, sputum production, shortness of breath and wheezing.    Cardiovascular: Negative for chest pain, palpitations,  and leg swelling.   Gastrointestinal: Negative for heartburn, nausea, vomiting, diarrhea and abdominal pain.   Genitourinary: Negative for dysuria, urgency and frequency.   Musculoskeletal: Negative for myalgias, back pain and joint pain.   Skin: Negative for rash and itching.   Neurological: + dizziness, no tingling, tremors, sensory change, focal weakness and headaches.   Endo/Heme/Allergies: Does not bruise/bleed easily.   Psychiatric/Behavioral: Negative for depression, anxiety, suicidal ideas, insomnia and memory loss.      Exam:  /84   Pulse 82   Temp 37.1 °C (98.7 °F)   Resp 18   Ht 1.727 m (5' 8\")   Wt 108 kg (238 lb)   SpO2 97%     General:  Well nourished, well developed in NAD  Head is grossly normal.  Neck: Supple without JVD   Pulmonary:  Normal effort.CTAB   Cardiovascular: Regular rate and rhythm  No m/r/g  Extremities: no clubbing, cyanosis, or edema.  Psych: affect appropriate      Please note that this dictation was created using voice recognition software. I have made every reasonable attempt to correct obvious errors, but I expect that there are errors of grammar and possibly content that I did not discover before finalizing the note.    Assessment/Plan:  1. Orthostasis  2. ACE-inhibitor cough  Will d/c the lisinopril-HCTZ and start benicar 20 mg and titrate up after 2 weeks if pressure remains >140/90  Home BP checks.  If still symptomatic will d/c the flomax too.     1 month follow up        "

## 2019-09-10 ENCOUNTER — OFFICE VISIT (OUTPATIENT)
Dept: URGENT CARE | Facility: PHYSICIAN GROUP | Age: 54
End: 2019-09-10
Payer: COMMERCIAL

## 2019-09-10 VITALS
RESPIRATION RATE: 14 BRPM | TEMPERATURE: 97.6 F | BODY MASS INDEX: 36.37 KG/M2 | OXYGEN SATURATION: 96 % | DIASTOLIC BLOOD PRESSURE: 92 MMHG | HEIGHT: 68 IN | WEIGHT: 240 LBS | SYSTOLIC BLOOD PRESSURE: 146 MMHG | HEART RATE: 88 BPM

## 2019-09-10 DIAGNOSIS — R09.82 POST-NASAL DRIP: ICD-10-CM

## 2019-09-10 DIAGNOSIS — J01.00 ACUTE NON-RECURRENT MAXILLARY SINUSITIS: Primary | ICD-10-CM

## 2019-09-10 DIAGNOSIS — S46.912A LEFT SHOULDER STRAIN, INITIAL ENCOUNTER: ICD-10-CM

## 2019-09-10 PROCEDURE — 99214 OFFICE O/P EST MOD 30 MIN: CPT | Performed by: PHYSICIAN ASSISTANT

## 2019-09-10 RX ORDER — AMOXICILLIN AND CLAVULANATE POTASSIUM 875; 125 MG/1; MG/1
1 TABLET, FILM COATED ORAL 2 TIMES DAILY
Qty: 20 TAB | Refills: 0 | Status: SHIPPED | OUTPATIENT
Start: 2019-09-10 | End: 2020-03-11

## 2019-09-10 ASSESSMENT — ENCOUNTER SYMPTOMS: COUGH: 1

## 2019-09-10 NOTE — PROGRESS NOTES
Subjective:      Lawrence Garrett is a 54 y.o. male who presents with Cough (runny nose, congested [id number]) and Shoulder Pain (possibly dislocated [id number])    PMH:  has a past medical history of Anxiety, Dyslipidemia (11/17/2010), Elevated liver function tests (11/17/2010), HTN (hypertension) (11/17/2010), Hyperlipidemia, and Hypertension.  MEDS:   Current Outpatient Medications:   •  amoxicillin-clavulanate (AUGMENTIN) 875-125 MG Tab, Take 1 Tab by mouth 2 times a day., Disp: 20 Tab, Rfl: 0  •  olmesartan (BENICAR) 40 MG Tab, Take 0.5-1 Tabs by mouth every day for 90 days. Start with 1/2 tab daily for 2 weeks, if BP is >140/90 start taking full tab daily, Disp: 90 Tab, Rfl: 3  •  citalopram (CELEXA) 20 MG Tab, TAKE 1 TABLET BY MOUTH EVERY DAY, Disp: 90 Tab, Rfl: 1  •  tamsulosin (FLOMAX) 0.4 MG capsule, Take 1 Cap by mouth ONE-HALF HOUR AFTER BREAKFAST., Disp: 90 Cap, Rfl: 3  •  finasteride (PROSCAR) 5 MG Tab, Take 1 Tab by mouth every day., Disp: 90 Tab, Rfl: 3  •  atorvastatin (LIPITOR) 40 MG Tab, TAKE 1 TABLET BY MOUTH EVERY DAY, Disp: 90 Tab, Rfl: 3  •  clotrimazole-betamethasone (LOTRISONE) 1-0.05 % Cream, Apply 1 Application to affected area(s) 2 times a day., Disp: 1 Tube, Rfl: 3  •  ibuprofen (MOTRIN) 800 MG Tab, TAKE 1 TAB BY MOUTH EVERY 8 HOURS AS NEEDED. FOR MILD PAIN, Disp: 30 Tab, Rfl: 1  •  aspirin (ASA) 325 MG TABS, Take 325 mg by mouth every day., Disp: , Rfl:   •  docosahexanoic acid (FISH OIL) 1000 MG CAPS, Take 1,000 mg by mouth 2 Times a Day., Disp: , Rfl:   •  vitamin D (CHOLECALCIFEROL) 1000 UNIT TABS, Take 1,000 Units by mouth every day., Disp: , Rfl:   ALLERGIES: No Known Allergies  SURGHX:   Past Surgical History:   Procedure Laterality Date   • VASECTOMY       SOCHX:  reports that he has never smoked. He has never used smokeless tobacco. He reports that he drinks alcohol. He reports that he does not use drugs.  FH: Reviewed with patient, not pertinent to this visit.           Patient  "presents with 2 very distinct complaints.     Cough: runny nose, congested [id number]    Shoulder Pain: \"possibly dislocated\" x3 weeks though no particular injury reported.    Pt states he has been able to use his left arm but very painful becoming more more difficult to raise his left arm above his shoulder.  Patient has been taking over-the-counter ibuprofen with little improvement.        Cough   This is a new problem. Episode onset: 10 days. The problem has been gradually worsening. The problem occurs every few minutes. The cough is productive of sputum. Associated symptoms include headaches, myalgias, nasal congestion, postnasal drip and rhinorrhea. Pertinent negatives include no chills, sore throat, shortness of breath or wheezing. The symptoms are aggravated by lying down and pollens. He has tried body position changes, OTC cough suppressant and rest for the symptoms. The treatment provided mild relief. His past medical history is significant for bronchitis. There is no history of asthma or COPD.   Shoulder Pain   This is a new problem. Episode onset: 3 weeks. The problem occurs constantly. The problem has been gradually worsening. Associated symptoms include arthralgias, congestion, coughing, headaches and myalgias. Pertinent negatives include no chills, joint swelling, numbness or sore throat. The symptoms are aggravated by exertion (Pain with all range of motion of right shoulder). He has tried ice, NSAIDs, position changes and rest for the symptoms. The treatment provided no relief.       Review of Systems   Constitutional: Negative for chills.   HENT: Positive for congestion, postnasal drip, rhinorrhea and sinus pain. Negative for sore throat.    Respiratory: Positive for cough and sputum production. Negative for shortness of breath and wheezing.    Musculoskeletal: Positive for arthralgias, joint pain (Left shoulder) and myalgias. Negative for joint swelling.   Neurological: Positive for headaches. " "Negative for tingling, sensory change, focal weakness and numbness.   All other systems reviewed and are negative.         Objective:     /92 (BP Location: Right arm, Patient Position: Sitting, BP Cuff Size: Large adult)   Pulse 88   Temp 36.4 °C (97.6 °F) (Temporal)   Resp 14   Ht 1.727 m (5' 8\")   Wt 108.9 kg (240 lb)   SpO2 96%   BMI 36.49 kg/m²      Physical Exam   Constitutional: He is oriented to person, place, and time. He appears well-developed and well-nourished. No distress.   HENT:   Head: Normocephalic and atraumatic.   Right Ear: Tympanic membrane normal.   Left Ear: Tympanic membrane normal.   Nose: Mucosal edema and rhinorrhea present. Right sinus exhibits maxillary sinus tenderness. Left sinus exhibits maxillary sinus tenderness.   Mouth/Throat: Uvula is midline, oropharynx is clear and moist and mucous membranes are normal.   Eyes: Pupils are equal, round, and reactive to light. Conjunctivae and EOM are normal.   Neck: Normal range of motion. Neck supple.   Cardiovascular: Normal rate, regular rhythm, normal heart sounds and intact distal pulses.   Pulmonary/Chest: Effort normal and breath sounds normal. He has no rales.   Abdominal: Soft.   Musculoskeletal:        Left shoulder: He exhibits decreased range of motion, tenderness, bony tenderness and pain. He exhibits no swelling, no effusion, no crepitus and normal pulse.   Neurological: He is alert and oriented to person, place, and time. He exhibits normal muscle tone. Gait normal.   Skin: Skin is warm and dry. Capillary refill takes less than 2 seconds.   Psychiatric: He has a normal mood and affect.   Nursing note and vitals reviewed.           Assessment/Plan:     1. Acute non-recurrent maxillary sinusitis  amoxicillin-clavulanate (AUGMENTIN) 875-125 MG Tab    REFERRAL TO SPORTS MEDICINE   2. Post-nasal drip  amoxicillin-clavulanate (AUGMENTIN) 875-125 MG Tab    REFERRAL TO SPORTS MEDICINE   3. Left shoulder strain, initial " encounter  amoxicillin-clavulanate (AUGMENTIN) 875-125 MG Tab    REFERRAL TO SPORTS MEDICINE   PT can continue OTC medications, increase fluids and rest until symptoms improve.     Motrin/Advil/Ibuprophen 600 mg every 6 hours as needed for pain or fever.    Patient declined x-ray today, however he is interested in getting a referral to sports medicine which was placed in his chart.    RICE TREATMENT FOR EXTREMITY INJURIES:  R-rest the extremity as much as possible while pain and swelling persist  I-ice the extremity 15 minutes every 2 hours for the first 24 hours, then 4-5 times daily   C-compress the extremity either with splint or ace wrap as directed  E-elevate the extremity to help with swelling    PT should follow up with PCP in 1-2 days for re-evaluation if symptoms have not improved.  Discussed red flags and reasons to return to UC or ED.  Pt and/or family verbalized understanding of diagnosis and follow up instructions and was offered informational handout on diagnosis.  PT discharged.

## 2019-09-13 ASSESSMENT — ENCOUNTER SYMPTOMS
SORE THROAT: 0
SINUS PAIN: 1
HEADACHES: 1
MYALGIAS: 1
CHILLS: 0
WHEEZING: 0
ARTHRALGIAS: 1
RHINORRHEA: 1
FOCAL WEAKNESS: 0
SPUTUM PRODUCTION: 1
SHORTNESS OF BREATH: 0
TINGLING: 0
SENSORY CHANGE: 0
JOINT SWELLING: 0
NUMBNESS: 0

## 2019-09-13 ASSESSMENT — COPD QUESTIONNAIRES: COPD: 0

## 2019-09-16 ENCOUNTER — OFFICE VISIT (OUTPATIENT)
Dept: MEDICAL GROUP | Facility: CLINIC | Age: 54
End: 2019-09-16
Payer: COMMERCIAL

## 2019-09-16 VITALS
BODY MASS INDEX: 36.37 KG/M2 | DIASTOLIC BLOOD PRESSURE: 86 MMHG | HEART RATE: 78 BPM | OXYGEN SATURATION: 97 % | HEIGHT: 68 IN | TEMPERATURE: 98.4 F | WEIGHT: 240 LBS | RESPIRATION RATE: 14 BRPM | SYSTOLIC BLOOD PRESSURE: 136 MMHG

## 2019-09-16 DIAGNOSIS — M75.82 ROTATOR CUFF TENDINITIS, LEFT: ICD-10-CM

## 2019-09-16 DIAGNOSIS — M75.42 SUBACROMIAL IMPINGEMENT, LEFT: ICD-10-CM

## 2019-09-16 PROCEDURE — 99203 OFFICE O/P NEW LOW 30 MIN: CPT | Performed by: FAMILY MEDICINE

## 2019-09-16 RX ORDER — METHOCARBAMOL 750 MG/1
750 TABLET, FILM COATED ORAL PRN
COMMUNITY
End: 2020-06-11
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
## 2019-10-01 ENCOUNTER — OFFICE VISIT (OUTPATIENT)
Dept: MEDICAL GROUP | Facility: PHYSICIAN GROUP | Age: 54
End: 2019-10-01
Payer: COMMERCIAL

## 2019-10-01 VITALS
WEIGHT: 237.6 LBS | OXYGEN SATURATION: 93 % | RESPIRATION RATE: 16 BRPM | HEIGHT: 68 IN | DIASTOLIC BLOOD PRESSURE: 90 MMHG | TEMPERATURE: 98.5 F | BODY MASS INDEX: 36.01 KG/M2 | HEART RATE: 96 BPM | SYSTOLIC BLOOD PRESSURE: 146 MMHG

## 2019-10-01 DIAGNOSIS — M25.512 CHRONIC LEFT SHOULDER PAIN: ICD-10-CM

## 2019-10-01 DIAGNOSIS — Z23 NEED FOR VACCINATION: ICD-10-CM

## 2019-10-01 DIAGNOSIS — N53.19 EJACULATORY DISORDER: ICD-10-CM

## 2019-10-01 DIAGNOSIS — G89.29 CHRONIC LEFT SHOULDER PAIN: ICD-10-CM

## 2019-10-01 DIAGNOSIS — I10 ESSENTIAL HYPERTENSION: ICD-10-CM

## 2019-10-01 DIAGNOSIS — N40.1 BENIGN PROSTATIC HYPERPLASIA WITH LOWER URINARY TRACT SYMPTOMS, SYMPTOM DETAILS UNSPECIFIED: ICD-10-CM

## 2019-10-01 PROCEDURE — 99214 OFFICE O/P EST MOD 30 MIN: CPT | Performed by: FAMILY MEDICINE

## 2019-10-01 RX ORDER — OLMESARTAN MEDOXOMIL 40 MG/1
40 TABLET ORAL DAILY
Qty: 90 TAB | Refills: 3
Start: 2019-10-01 | End: 2019-12-30
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
## 2019-10-24 ENCOUNTER — OFFICE VISIT (OUTPATIENT)
Dept: MEDICAL GROUP | Facility: CLINIC | Age: 54
End: 2019-10-24
Payer: COMMERCIAL

## 2019-10-24 VITALS
WEIGHT: 237.6 LBS | BODY MASS INDEX: 36.01 KG/M2 | OXYGEN SATURATION: 97 % | SYSTOLIC BLOOD PRESSURE: 126 MMHG | DIASTOLIC BLOOD PRESSURE: 84 MMHG | TEMPERATURE: 98.7 F | HEIGHT: 68 IN | HEART RATE: 88 BPM | RESPIRATION RATE: 16 BRPM

## 2019-10-24 DIAGNOSIS — M75.42 SUBACROMIAL IMPINGEMENT, LEFT: ICD-10-CM

## 2019-10-24 DIAGNOSIS — M75.82 ROTATOR CUFF TENDINITIS, LEFT: ICD-10-CM

## 2019-10-24 PROCEDURE — 99213 OFFICE O/P EST LOW 20 MIN: CPT | Mod: 25 | Performed by: FAMILY MEDICINE

## 2019-10-24 RX ORDER — TRIAMCINOLONE ACETONIDE 40 MG/ML
40 INJECTION, SUSPENSION INTRA-ARTICULAR; INTRAMUSCULAR ONCE
Status: COMPLETED | OUTPATIENT
Start: 2019-10-24 | End: 2019-10-24

## 2019-10-24 RX ADMIN — TRIAMCINOLONE ACETONIDE 40 MG: 40 INJECTION, SUSPENSION INTRA-ARTICULAR; INTRAMUSCULAR at 15:47

## 2019-10-24 NOTE — PROCEDURES
PROCEDURE NOTE:  left Shoulder subacromial injection  Risks and benefits discussed  Informed consent obtained  Shoulder prepped in sterile fashion utilizing a posterior approach  40 mg of Kenalog and 5 cc of bupivacaine injected into the subacromial space  Vapocoolant spray was utilized  Patient tolerated the procedure well  Postprocedure care and red flags discussed
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
## 2019-11-25 ENCOUNTER — OFFICE VISIT (OUTPATIENT)
Dept: MEDICAL GROUP | Facility: CLINIC | Age: 54
End: 2019-11-25
Payer: COMMERCIAL

## 2019-11-25 VITALS
HEIGHT: 68 IN | SYSTOLIC BLOOD PRESSURE: 122 MMHG | BODY MASS INDEX: 36.01 KG/M2 | RESPIRATION RATE: 18 BRPM | OXYGEN SATURATION: 95 % | TEMPERATURE: 98.3 F | WEIGHT: 237.6 LBS | HEART RATE: 96 BPM | DIASTOLIC BLOOD PRESSURE: 80 MMHG

## 2019-11-25 DIAGNOSIS — M75.82 ROTATOR CUFF TENDINITIS, LEFT: ICD-10-CM

## 2019-11-25 DIAGNOSIS — M75.42 SUBACROMIAL IMPINGEMENT, LEFT: ICD-10-CM

## 2019-11-25 PROCEDURE — 99213 OFFICE O/P EST LOW 20 MIN: CPT | Performed by: FAMILY MEDICINE

## 2019-11-25 NOTE — PROGRESS NOTES
"CHIEF COMPLAINT:  FOLLOW UP for LEFT shoulder pain    Lawrence Garrett is complaining of left shoulder pain  Date of onset, August 4, 2019  Mechanism, he was sitting down into a long chair and was weightbearing on both hands as he was coming down and he felt a pop in his LEFT shoulder  Felt a subluxation type pop and then subsequent sudden pain  Pain is at the deltoid region , deep     Still having some pain with opening a vehicle door  Improved with  rest     Shot HELPED, but still has some pain  About 50% improved  Still has some pain  Worse with external rotation and abduction  He has not started PT since \" he was not called\"    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  Gait: normal    Cervical spine:  Range of motion Slightly limited with Lateral rotation    Shoulder Exam:    RIGHT Shoulder:  No visible swelling   Range of motion INTACT  Tenderness: Non-tender  Empty Can Testing 5/5  Internal Rotation 5/5  External Rotation 5/5  Lift Off Testing 5/5  Impingement testing Selby  NEGATIVE  Neer's testing NEGATIVE    LEFT Shoulder:  No visible swelling   Range of motion INTACT  Tenderness: Supraspinatous tenderness  Empty Can Testing 5/5  Internal Rotation 5/5  External Rotation 5/5  Lift Off Testing 5/5  Impingement testing Selby  POSITIVE  Neer's testing POSITIVE  Apprehension testing POSITIVE  Relocation testing NEGATIVE  Bhatia's Testing NEGATIVE  Grind Testing NEGATIVE    Additional Findings: Flexed Posture    1. Subacromial impingement, left  MR-SHOULDER-W/O LEFT   2. Rotator cuff tendinitis, left (infraspinatus)  MR-SHOULDER-W/O LEFT     Date of onset, August " "4, 2019  Mechanism, he was sitting down into a long chair and was weightbearing on both hands as he was coming down and he felt a pop in his LEFT shoulder    Patient has improved somewhat with home exercise program and working with   He has not yet set up formal physical therapy ([facility] at [facility])  Scheduling information was provided TODAY's visit    Return in about 4 weeks (around 12/23/2019).  To see how he is doing after subacromial injection of the LEFT shoulder and formal physical therapy        8/21/2019 8:49 AM    HISTORY/REASON FOR EXAM:  Shoulder pain, possible trauma.    TECHNIQUE/EXAM DESCRIPTION AND NUMBER OF VIEWS:  3 views of the LEFT shoulder.    COMPARISON: None    FINDINGS:    BONE MINERALIZATION: Normal.  JOINTS: Preserved. No erosions.  FRACTURE: None.  DISLOCATION: None.  SOFT TISSUES: No mass.      Impression       Preserved acromioclavicular and glenohumeral joints. No fracture or dislocation.             8/21/2019 8:49 AM    HISTORY/REASON FOR EXAM:  Chronic neck and back pain.    TECHNIQUE/EXAM DESCRIPTION AND NUMBER OF VIEWS:  Cervical spine series, 3 views.    COMPARISON:  11/1/2017.    FINDINGS:  Preserved cervical lordosis. No acute fracture or listhesis.  Mild-to-moderate spondylosis at C5-C6 level. Disc height loss at this level. The rest of the disc spaces are relatively preserved. No significant facet hypertrophy.  No soft tissue abnormality is identified.      Impression       Mild-to-moderate spondylosis at C5-C6, unchanged since prior study. No fracture or listhesis.     Thank you Dominga Morton PA-C for allowing me to participate in caring for the patient.      ADDENDUM:  December 5, 2019  Patient contacted office stating his pain persists  He has FAILED corticosteroid injection  He has had pain since August 2019 (4 months)  POSITIVE history of feeling a \"pop in the shoulder\" at the time of injury  Given the above findings and FAILURE of conservative measures, " recommend MRI study of the LEFT shoulder

## 2019-12-18 ENCOUNTER — TELEPHONE (OUTPATIENT)
Dept: MEDICAL GROUP | Facility: CLINIC | Age: 54
End: 2019-12-18

## 2019-12-18 NOTE — TELEPHONE ENCOUNTER
The patient will call the scheduling department to make an appointment for the MRI through [facility] and if authorization is warranted, the Autho Department will work on the request. The understood this and I provided the contact info for [facility] Imaging Scheduling.      --------------------------------------------------------------------------------------------------------------  Silvia Oh,    I received a fax that re: the MRI of the LT Shoulder @ [facility] and with the patient's insurance, they ar out of network. I called the patient, discussed this with him and the patient would like to re-direct the MRI Order to a location that is in-network. I called the [facility] Imaging/Autho Department to update and work on redirecting the MRI order for the patient.    Sarah Black

## 2019-12-26 ENCOUNTER — OFFICE VISIT (OUTPATIENT)
Dept: MEDICAL GROUP | Facility: CLINIC | Age: 54
End: 2019-12-26
Payer: COMMERCIAL

## 2019-12-26 VITALS
OXYGEN SATURATION: 96 % | WEIGHT: 237.6 LBS | SYSTOLIC BLOOD PRESSURE: 158 MMHG | RESPIRATION RATE: 18 BRPM | DIASTOLIC BLOOD PRESSURE: 100 MMHG | HEIGHT: 68 IN | HEART RATE: 78 BPM | BODY MASS INDEX: 36.01 KG/M2 | TEMPERATURE: 97.9 F

## 2019-12-26 DIAGNOSIS — M75.82 ROTATOR CUFF TENDINITIS, LEFT: ICD-10-CM

## 2019-12-26 DIAGNOSIS — M75.42 SUBACROMIAL IMPINGEMENT, LEFT: ICD-10-CM

## 2019-12-26 PROCEDURE — 99213 OFFICE O/P EST LOW 20 MIN: CPT | Performed by: FAMILY MEDICINE

## 2019-12-26 NOTE — PROGRESS NOTES
"CHIEF COMPLAINT:  FOLLOW UP for LEFT shoulder pain    Lawrence Garrett is complaining of left shoulder pain  Date of onset, August 4, 2019  Mechanism, he was sitting down into a long chair and was weightbearing on both hands as he was coming down and he felt a pop in his LEFT shoulder  Felt a subluxation type pop and then subsequent sudden pain  Pain is at the deltoid region , deep     Still having some pain with opening a vehicle door  Improved with  rest   He was popping some shipping air packets and felt sudden pain when smashing the air packet    FAILED corticosteroid injection  Continues to have pain  PENDING MRI study December 30  PT helping somewhat with strength, but he still having continued pain    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  Gait: normal    Cervical spine:  Range of motion Slightly limited with Lateral rotation    Shoulder Exam:    RIGHT Shoulder:  No visible swelling   Range of motion INTACT  Tenderness: Non-tender  Empty Can Testing 5/5  Internal Rotation 5/5  External Rotation 5/5  Lift Off Testing 5/5  Impingement testing Selby  NEGATIVE  Neer's testing NEGATIVE    LEFT Shoulder:  No visible swelling   Range of motion INTACT  Tenderness: Supraspinatous tenderness, pain with empty can and with external rotation  empty Can Testing 5/5  Internal Rotation 5/5  External Rotation 5/5  Lift Off Testing 5/5    Additional Findings: Flexed Posture    1. Subacromial impingement, left  REFERRAL TO ORTHOPEDICS   2. Rotator cuff tendinitis, left (infraspinatus)  REFERRAL TO ORTHOPEDICS     Date of onset, August 4, 2019  Mechanism, he was sitting down " into a long chair and was weightbearing on both hands as he was coming down and he felt a pop in his LEFT shoulder    FAILED formal physical therapy  FAILED subacromial corticosteroid  Pain persists    Pending MRI study on December 30, 2019    Referral for orthopedic evaluation since he has failed all nonoperative measures at this point  He can follow-up with me to discuss the MRI or he can discuss the MRI results with the orthopedic surgeon as well if he wishes          8/21/2019 8:49 AM    HISTORY/REASON FOR EXAM:  Shoulder pain, possible trauma.    TECHNIQUE/EXAM DESCRIPTION AND NUMBER OF VIEWS:  3 views of the LEFT shoulder.    COMPARISON: None    FINDINGS:    BONE MINERALIZATION: Normal.  JOINTS: Preserved. No erosions.  FRACTURE: None.  DISLOCATION: None.  SOFT TISSUES: No mass.      Impression       Preserved acromioclavicular and glenohumeral joints. No fracture or dislocation.             8/21/2019 8:49 AM    HISTORY/REASON FOR EXAM:  Chronic neck and back pain.    TECHNIQUE/EXAM DESCRIPTION AND NUMBER OF VIEWS:  Cervical spine series, 3 views.    COMPARISON:  11/1/2017.    FINDINGS:  Preserved cervical lordosis. No acute fracture or listhesis.  Mild-to-moderate spondylosis at C5-C6 level. Disc height loss at this level. The rest of the disc spaces are relatively preserved. No significant facet hypertrophy.  No soft tissue abnormality is identified.      Impression       Mild-to-moderate spondylosis at C5-C6, unchanged since prior study. No fracture or listhesis.     Thank you Dominga Morton PA-C for allowing me to participate in caring for the patient.

## 2019-12-30 ENCOUNTER — HOSPITAL ENCOUNTER (OUTPATIENT)
Dept: RADIOLOGY | Facility: MEDICAL CENTER | Age: 54
End: 2019-12-30
Attending: FAMILY MEDICINE
Payer: COMMERCIAL

## 2019-12-30 DIAGNOSIS — M75.42 SUBACROMIAL IMPINGEMENT, LEFT: ICD-10-CM

## 2019-12-30 DIAGNOSIS — M75.82 ROTATOR CUFF TENDINITIS, LEFT: ICD-10-CM

## 2019-12-30 PROCEDURE — 73221 MRI JOINT UPR EXTREM W/O DYE: CPT | Mod: LT

## 2020-01-07 ENCOUNTER — OFFICE VISIT (OUTPATIENT)
Dept: MEDICAL GROUP | Facility: CLINIC | Age: 55
End: 2020-01-07
Payer: COMMERCIAL

## 2020-01-07 VITALS — BODY MASS INDEX: 36.01 KG/M2 | HEIGHT: 68 IN | WEIGHT: 237.6 LBS

## 2020-01-07 DIAGNOSIS — M75.42 SUBACROMIAL IMPINGEMENT OF LEFT SHOULDER: ICD-10-CM

## 2020-01-07 DIAGNOSIS — S46.012D TRAUMATIC INCOMPLETE TEAR OF LEFT ROTATOR CUFF, SUBSEQUENT ENCOUNTER: ICD-10-CM

## 2020-01-07 PROCEDURE — 99213 OFFICE O/P EST LOW 20 MIN: CPT | Performed by: FAMILY MEDICINE

## 2020-01-07 NOTE — PROGRESS NOTES
1. Traumatic incomplete tear of left rotator cuff, subsequent encounter  REFERRAL TO ORTHOPEDICS   2. Subacromial impingement of left shoulder  REFERRAL TO ORTHOPEDICS     Patient here to discuss MRI results     Dr. Huang referral placed    Follow-up with me as needed               12/30/2019 5:22 PM     HISTORY/REASON FOR EXAM:  Shoulder pain, rotator cuff disorder suspected, nondiagnostic xray; Shoulder pain, labral tear suspected, nondiagnostic xray  Dislocated Aug 2019, popped back in by itself, continued pain, decreased ROM     TECHNIQUE/EXAM DESCRIPTION:  MRI of the LEFT shoulder without contrast.     The study was performed on a Rah 3.0 Brie MRI scanner. T1 sagittal, fast spin-echo T2 fat-suppressed oblique coronal, sagittal, and axial and intermediate fast spin-echo oblique coronal images were obtained.     COMPARISON:  Radiograph 8/21/2019.     FINDINGS:     ROTATOR CUFF:     There is 9 mm partial thickness interstitial tear involving the footprint of the supraspinatus tendon.     Partial-thickness interstitial tear involving the infraspinatus tendon near the myotendinous junction as well     There is also partial thickness bursal sided tear of the supraspinatus tendon as well.     Diffuse tendinosis of the subscapularis tendon.     No rotator cuff muscle atrophy or fatty infiltration.     GLENOHUMERAL JOINT:  - Labrum: Degeneration of the superior labrum  - Cartilage: Mild cartilage thinning.  - Bone: Mild marginal spurring. No Hill-Sachs or osseous Bankart, or other fracture or osseous contusion.        LONG HEAD OF BICEPS TENDON:  Intact intra-and extra-articular portions.     SUBACROMIAL-SUBDELTOID BURSA:  Small effusion.     ACROMIOCLAVICULAR JOINT:  Small subacromial enthesophyte.  Moderate osteoarthritis.     __________________________________     IMPRESSION:       1. Partial thickness bursal sided tear of the supraspinatus tendon with associated subacromial subdeltoid bursitis. This is  likely secondary to a small subacromial enthesophyte.     2. Partial-thickness interstitial tear involving the footprint of the supraspinatus tendon as well as the myotendinous junction of the infraspinatus tendon     3. Tendinosis of the subscapularis tendon.     4. Moderate osteoarthritis of the AC joint. Mild osteoarthritis of the glenohumeral joint.    Interpreted in the office today with the patient\    Follow-up as needed

## 2020-01-13 ENCOUNTER — PATIENT MESSAGE (OUTPATIENT)
Dept: MEDICAL GROUP | Facility: PHYSICIAN GROUP | Age: 55
End: 2020-01-13

## 2020-02-18 ENCOUNTER — OFFICE VISIT (OUTPATIENT)
Dept: URGENT CARE | Facility: PHYSICIAN GROUP | Age: 55
End: 2020-02-18
Payer: COMMERCIAL

## 2020-02-18 ENCOUNTER — PATIENT MESSAGE (OUTPATIENT)
Dept: MEDICAL GROUP | Facility: PHYSICIAN GROUP | Age: 55
End: 2020-02-18

## 2020-02-18 VITALS
HEIGHT: 68 IN | BODY MASS INDEX: 35.77 KG/M2 | RESPIRATION RATE: 18 BRPM | TEMPERATURE: 98.5 F | WEIGHT: 236 LBS | DIASTOLIC BLOOD PRESSURE: 118 MMHG | OXYGEN SATURATION: 96 % | SYSTOLIC BLOOD PRESSURE: 164 MMHG | HEART RATE: 72 BPM

## 2020-02-18 DIAGNOSIS — R03.0 ELEVATED BLOOD PRESSURE READING: ICD-10-CM

## 2020-02-18 DIAGNOSIS — I10 ESSENTIAL HYPERTENSION: ICD-10-CM

## 2020-02-18 DIAGNOSIS — E78.5 DYSLIPIDEMIA: ICD-10-CM

## 2020-02-18 PROCEDURE — 99214 OFFICE O/P EST MOD 30 MIN: CPT | Performed by: NURSE PRACTITIONER

## 2020-02-18 RX ORDER — HYDROCHLOROTHIAZIDE 12.5 MG/1
12.5 TABLET ORAL DAILY
Qty: 30 TAB | Refills: 0 | Status: SHIPPED | OUTPATIENT
Start: 2020-02-18 | End: 2020-03-11

## 2020-02-18 ASSESSMENT — ENCOUNTER SYMPTOMS
SPUTUM PRODUCTION: 0
MYALGIAS: 0
PALPITATIONS: 0
TINGLING: 0
WHEEZING: 0
COUGH: 0
FEVER: 0
SHORTNESS OF BREATH: 0
BLURRED VISION: 0
WEAKNESS: 0
DIZZINESS: 0
SINUS PAIN: 0
BACK PAIN: 0
SENSORY CHANGE: 0
DOUBLE VISION: 0
SORE THROAT: 0
ORTHOPNEA: 0
HEADACHES: 0
CHILLS: 0
PHOTOPHOBIA: 0

## 2020-02-19 NOTE — PROGRESS NOTES
"Subjective:      Lawrence Garrett is a 54 y.o. male who presents with Blood Pressure Problem (high BP)            HPI  States 160/106 today at dentist, 2 weeks ago at dentist similar reading of BP. Last BP reading at home taken in AM, a week ago: does not remember what it was. Taking Benicar 1 tab daily since 10/2019 per PCP. Denies CP/pressure, SOB, palpitations, but has lower back pain from gym yesterday, experiencing \"soreness\" in back muscles. No leg swelling. Denies otc meds taken except Ibuprofen. Denies high salt intake. No recent cough, fever, illness. No dental pain. Ibuprofen 800 mg last night and this AM only in last 2 weeks. H/o left shoulder \"tears\", will see surgeon on 3/30/20. H/o HTN. Has apppt with PCP on 3/11/20 regarding this recent BP elevation. Has ordered blood work since 6/2019 but has not gotten done.    PMH:  has a past medical history of Anxiety, Dyslipidemia (11/17/2010), Elevated liver function tests (11/17/2010), HTN (hypertension) (11/17/2010), Hyperlipidemia, and Hypertension.  MEDS:   Current Outpatient Medications:   •  methocarbamol (ROBAXIN) 750 MG Tab, Take 750 mg by mouth as needed., Disp: , Rfl:   •  amoxicillin-clavulanate (AUGMENTIN) 875-125 MG Tab, Take 1 Tab by mouth 2 times a day. (Patient not taking: Reported on 10/1/2019), Disp: 20 Tab, Rfl: 0  •  citalopram (CELEXA) 20 MG Tab, TAKE 1 TABLET BY MOUTH EVERY DAY, Disp: 90 Tab, Rfl: 1  •  tamsulosin (FLOMAX) 0.4 MG capsule, Take 1 Cap by mouth ONE-HALF HOUR AFTER BREAKFAST., Disp: 90 Cap, Rfl: 3  •  finasteride (PROSCAR) 5 MG Tab, Take 1 Tab by mouth every day., Disp: 90 Tab, Rfl: 3  •  atorvastatin (LIPITOR) 40 MG Tab, TAKE 1 TABLET BY MOUTH EVERY DAY, Disp: 90 Tab, Rfl: 3  •  clotrimazole-betamethasone (LOTRISONE) 1-0.05 % Cream, Apply 1 Application to affected area(s) 2 times a day. (Patient not taking: Reported on 10/1/2019), Disp: 1 Tube, Rfl: 3  •  ibuprofen (MOTRIN) 800 MG Tab, TAKE 1 TAB BY MOUTH EVERY 8 HOURS AS " "NEEDED. FOR MILD PAIN, Disp: 30 Tab, Rfl: 1  •  aspirin (ASA) 325 MG TABS, Take 325 mg by mouth every day., Disp: , Rfl:   •  docosahexanoic acid (FISH OIL) 1000 MG CAPS, Take 1,000 mg by mouth 2 Times a Day., Disp: , Rfl:   •  vitamin D (CHOLECALCIFEROL) 1000 UNIT TABS, Take 5,000 Units by mouth every day., Disp: , Rfl:   ALLERGIES: No Known Allergies  SURGHX:   Past Surgical History:   Procedure Laterality Date   • VASECTOMY       SOCHX:  reports that he has never smoked. He has never used smokeless tobacco. He reports current alcohol use. He reports that he does not use drugs.  FH: Family history was reviewed, no pertinent findings to report    Review of Systems   Constitutional: Negative for chills, fever and malaise/fatigue.   HENT: Negative for congestion, ear pain, sinus pain and sore throat.    Eyes: Negative for blurred vision, double vision and photophobia.   Respiratory: Negative for cough, sputum production, shortness of breath and wheezing.    Cardiovascular: Negative for chest pain, palpitations, orthopnea and leg swelling.   Musculoskeletal: Negative for back pain and myalgias.   Skin: Negative for itching and rash.   Neurological: Negative for dizziness, tingling, sensory change, weakness and headaches.   Endo/Heme/Allergies: Negative for environmental allergies.   All other systems reviewed and are negative.         Objective:     BP (!) 164/118 (BP Location: Left arm, Patient Position: Sitting, BP Cuff Size: Adult)   Pulse 72   Temp 36.9 °C (98.5 °F) (Temporal)   Resp 18   Ht 1.727 m (5' 8\")   Wt 107 kg (236 lb)   SpO2 96%   BMI 35.88 kg/m²      Physical Exam  Vitals signs reviewed.   Constitutional:       General: He is awake. He is not in acute distress.     Appearance: Normal appearance. He is well-developed and well-groomed. He is not ill-appearing, toxic-appearing or diaphoretic.   HENT:      Head: Normocephalic.      Right Ear: Tympanic membrane, ear canal and external ear normal.      " Left Ear: Tympanic membrane, ear canal and external ear normal.      Nose: Mucosal edema present. No rhinorrhea.      Mouth/Throat:      Pharynx: No posterior oropharyngeal erythema.   Eyes:      General:         Right eye: No discharge.         Left eye: No discharge.      Conjunctiva/sclera: Conjunctivae normal.      Pupils: Pupils are equal, round, and reactive to light.   Neck:      Musculoskeletal: Normal range of motion.      Vascular: No carotid bruit or JVD.   Cardiovascular:      Rate and Rhythm: Normal rate and regular rhythm.      Pulses: Normal pulses.           Radial pulses are 2+ on the right side and 2+ on the left side.      Heart sounds: Normal heart sounds, S1 normal and S2 normal.   Pulmonary:      Effort: Pulmonary effort is normal. No accessory muscle usage or respiratory distress.      Breath sounds: Normal breath sounds.   Abdominal:      General: Bowel sounds are normal. There is no distension.      Palpations: Abdomen is soft.      Tenderness: There is no abdominal tenderness. There is no guarding or rebound.   Musculoskeletal: Normal range of motion.   Lymphadenopathy:      Cervical: No cervical adenopathy.   Skin:     General: Skin is warm and dry.   Neurological:      Mental Status: He is alert and oriented to person, place, and time.      GCS: GCS eye subscore is 4. GCS verbal subscore is 5. GCS motor subscore is 6.      Cranial Nerves: Cranial nerves are intact.      Sensory: Sensation is intact.      Motor: Motor function is intact.      Coordination: Coordination is intact.      Gait: Gait is intact.   Psychiatric:         Attention and Perception: Attention and perception normal.         Mood and Affect: Mood and affect normal.         Speech: Speech normal.         Behavior: Behavior normal. Behavior is cooperative.         Thought Content: Thought content normal.         Cognition and Memory: Memory normal.         Judgment: Judgment normal.                 Assessment/Plan:        1. Essential hypertension    - hydroCHLOROthiazide (HYDRODIURIL) 12.5 MG tablet; Take 1 Tab by mouth every day.  Dispense: 30 Tab; Refill: 0    2. Elevated blood pressure reading    - hydroCHLOROthiazide (HYDRODIURIL) 12.5 MG tablet; Take 1 Tab by mouth every day.  Dispense: 30 Tab; Refill: 0    Avoid high salt, processed, high fatty foods  Incorporate a healthy balanced diet of fruits, vegetables and lean meats  Increase activity level daily  Monitor high stress and poor sleeping habits  Increase water intake and decrease caffeine and sugary drinks  Keep log of BP readings for future PCP appointment   Recheck with continuous elevated BP readings >150/90 with symptoms  Monitor for signs of elevated HTN like dizziness, HA, vision change, n/v, numbness in upper extremities, CP, SOB- call 911 to evaluate immediately, go to ER  Contact PCP regarding daily blood pressure readings x 1 week with new HCTZ med used for further instructions, patient agrees to this  Get blood work done that PCP ordered

## 2020-02-20 ENCOUNTER — HOSPITAL ENCOUNTER (OUTPATIENT)
Dept: LAB | Facility: MEDICAL CENTER | Age: 55
End: 2020-02-20
Attending: FAMILY MEDICINE
Payer: COMMERCIAL

## 2020-02-20 DIAGNOSIS — E78.5 DYSLIPIDEMIA: ICD-10-CM

## 2020-02-20 DIAGNOSIS — I10 ESSENTIAL HYPERTENSION: ICD-10-CM

## 2020-02-20 LAB
ALBUMIN SERPL BCP-MCNC: 4.9 G/DL (ref 3.2–4.9)
ALBUMIN/GLOB SERPL: 1.6 G/DL
ALP SERPL-CCNC: 97 U/L (ref 30–99)
ALT SERPL-CCNC: 39 U/L (ref 2–50)
ANION GAP SERPL CALC-SCNC: 10 MMOL/L (ref 0–11.9)
AST SERPL-CCNC: 27 U/L (ref 12–45)
BILIRUB SERPL-MCNC: 1.8 MG/DL (ref 0.1–1.5)
BUN SERPL-MCNC: 11 MG/DL (ref 8–22)
CALCIUM SERPL-MCNC: 9.9 MG/DL (ref 8.5–10.5)
CHLORIDE SERPL-SCNC: 102 MMOL/L (ref 96–112)
CHOLEST SERPL-MCNC: 137 MG/DL (ref 100–199)
CO2 SERPL-SCNC: 29 MMOL/L (ref 20–33)
CREAT SERPL-MCNC: 0.92 MG/DL (ref 0.5–1.4)
FASTING STATUS PATIENT QL REPORTED: NORMAL
GLOBULIN SER CALC-MCNC: 3 G/DL (ref 1.9–3.5)
GLUCOSE SERPL-MCNC: 95 MG/DL (ref 65–99)
HDLC SERPL-MCNC: 47 MG/DL
LDLC SERPL CALC-MCNC: 52 MG/DL
POTASSIUM SERPL-SCNC: 3.9 MMOL/L (ref 3.6–5.5)
PROT SERPL-MCNC: 7.9 G/DL (ref 6–8.2)
SODIUM SERPL-SCNC: 141 MMOL/L (ref 135–145)
TRIGL SERPL-MCNC: 188 MG/DL (ref 0–149)

## 2020-02-20 PROCEDURE — 80053 COMPREHEN METABOLIC PANEL: CPT

## 2020-02-20 PROCEDURE — 80061 LIPID PANEL: CPT

## 2020-02-20 PROCEDURE — 36415 COLL VENOUS BLD VENIPUNCTURE: CPT

## 2020-03-02 ENCOUNTER — PATIENT MESSAGE (OUTPATIENT)
Dept: MEDICAL GROUP | Facility: PHYSICIAN GROUP | Age: 55
End: 2020-03-02

## 2020-03-02 NOTE — TELEPHONE ENCOUNTER
From: Lawrence Garrett  To: Loraine Hendrix M.D.  Sent: 3/2/2020 1:08 PM PST  Subject: Non-Urgent Medical Question    Please fax - if possible - my dentist with a note to continue with the deep cleaning - I have an appointment tomorrow morning.    [phone] - Lissa Dhillon    Thanks Ed

## 2020-03-11 ENCOUNTER — OFFICE VISIT (OUTPATIENT)
Dept: MEDICAL GROUP | Facility: PHYSICIAN GROUP | Age: 55
End: 2020-03-11
Payer: COMMERCIAL

## 2020-03-11 VITALS
RESPIRATION RATE: 16 BRPM | SYSTOLIC BLOOD PRESSURE: 128 MMHG | TEMPERATURE: 97.9 F | HEART RATE: 84 BPM | WEIGHT: 243.6 LBS | BODY MASS INDEX: 36.92 KG/M2 | HEIGHT: 68 IN | OXYGEN SATURATION: 92 % | DIASTOLIC BLOOD PRESSURE: 80 MMHG

## 2020-03-11 DIAGNOSIS — F33.41 RECURRENT MAJOR DEPRESSIVE DISORDER, IN PARTIAL REMISSION (HCC): ICD-10-CM

## 2020-03-11 DIAGNOSIS — L57.0 KERATOSIS: ICD-10-CM

## 2020-03-11 DIAGNOSIS — I10 ESSENTIAL HYPERTENSION: ICD-10-CM

## 2020-03-11 PROBLEM — E66.9 OBESITY (BMI 30-39.9): Status: RESOLVED | Noted: 2017-05-31 | Resolved: 2020-03-11

## 2020-03-11 PROCEDURE — 99214 OFFICE O/P EST MOD 30 MIN: CPT | Performed by: FAMILY MEDICINE

## 2020-03-11 RX ORDER — OLMESARTAN MEDOXOMIL AND HYDROCHLOROTHIAZIDE 40/12.5 40; 12.5 MG/1; MG/1
1 TABLET ORAL DAILY
Qty: 90 TAB | Refills: 3 | Status: SHIPPED | OUTPATIENT
Start: 2020-03-11 | End: 2020-09-28 | Stop reason: SDUPTHER

## 2020-03-11 RX ORDER — CITALOPRAM 20 MG/1
20 TABLET ORAL
Qty: 90 TAB | Refills: 3 | Status: SHIPPED | OUTPATIENT
Start: 2020-03-11 | End: 2021-06-29 | Stop reason: SDUPTHER

## 2020-03-11 RX ORDER — OLMESARTAN MEDOXOMIL 40 MG/1
TABLET ORAL
COMMUNITY
Start: 2020-01-09 | End: 2020-03-11

## 2020-03-11 ASSESSMENT — FIBROSIS 4 INDEX: FIB4 SCORE: 0.98

## 2020-03-11 NOTE — PROGRESS NOTES
Chief Complaint   Patient presents with   • Hypertension       HISTORY OF PRESENT ILLNESS: Patient is a 54 y.o. male established patient here today for the following concerns:    1. Essential hypertension  BP was slightly elevated up to 160s, has since started HCTZ in addition to the olmesartan and flomax.  Starting to lower now.  Labs reviewed and normal.      2. Keratosis  New onset noted in the last few months, left scalp.  No itching bleeding.      3. Recurrent major depressive disorder, in partial remission (HCC)  Needs refill on Celexa.  Has been doing well for mood and anxiety.  No adverse effects noted.        Past Medical, Social, and Family history reviewed and updated in EPIC    Allergies:Patient has no known allergies.    Current Outpatient Medications   Medication Sig Dispense Refill   • olmesartan-hydrochlorothiazide (BENICAR HCT) 40-12.5 MG per tablet Take 1 Tab by mouth every day. 90 Tab 3   • citalopram (CELEXA) 20 MG Tab Take 1 Tab by mouth every day. 90 Tab 3   • tamsulosin (FLOMAX) 0.4 MG capsule Take 1 Cap by mouth ONE-HALF HOUR AFTER BREAKFAST. 90 Cap 3   • finasteride (PROSCAR) 5 MG Tab Take 1 Tab by mouth every day. 90 Tab 3   • atorvastatin (LIPITOR) 40 MG Tab TAKE 1 TABLET BY MOUTH EVERY DAY 90 Tab 3   • ibuprofen (MOTRIN) 800 MG Tab TAKE 1 TAB BY MOUTH EVERY 8 HOURS AS NEEDED. FOR MILD PAIN 30 Tab 1   • aspirin (ASA) 325 MG TABS Take 325 mg by mouth every day.     • docosahexanoic acid (FISH OIL) 1000 MG CAPS Take 1,000 mg by mouth 2 Times a Day.     • vitamin D (CHOLECALCIFEROL) 1000 UNIT TABS Take 5,000 Units by mouth every day.     • methocarbamol (ROBAXIN) 750 MG Tab Take 750 mg by mouth as needed.       No current facility-administered medications for this visit.          ROS:  Review of Systems   Constitutional: Negative for fever, chills, weight loss and malaise/fatigue.   HENT: Negative for ear pain, nosebleeds, congestion, sore throat and neck pain.    Eyes: Negative for blurred  "vision.   Respiratory: Negative for cough, sputum production, shortness of breath and wheezing.    Cardiovascular: Negative for chest pain, palpitations,  and leg swelling.   Gastrointestinal: Negative for heartburn, nausea, vomiting, diarrhea and abdominal pain.   Genitourinary: Negative for dysuria, urgency and frequency.   Musculoskeletal: Negative for myalgias, back pain and joint pain.   Skin: Negative for rash and itching.   Neurological: Negative for dizziness, tingling, tremors, sensory change, focal weakness and headaches.   Endo/Heme/Allergies: Does not bruise/bleed easily.   Psychiatric/Behavioral: Negative for depression, anxiety, suicidal ideas, insomnia and memory loss.      Exam:  /80 (BP Location: Left arm, Patient Position: Sitting, BP Cuff Size: Large adult)   Pulse 84   Temp 36.6 °C (97.9 °F)   Resp 16   Ht 1.727 m (5' 8\")   Wt 110.5 kg (243 lb 9.6 oz)   SpO2 92%     General:  Well nourished, well developed in NAD  Head is grossly normal.  Neck: Supple without JVD   Pulmonary:  Normal effort.   Cardiovascular: Regular rate  Extremities: no clubbing, cyanosis, or edema.  Psych: affect appropriate  Skin: flesh colored raised lesion noted without telangiectasia on left scalp area.     Please note that this dictation was created using voice recognition software. I have made every reasonable attempt to correct obvious errors, but I expect that there are errors of grammar and possibly content that I did not discover before finalizing the note.    Assessment/Plan:  1. Essential hypertension  Will change to combo for ease of administration  - olmesartan-hydrochlorothiazide (BENICAR HCT) 40-12.5 MG per tablet; Take 1 Tab by mouth every day.  Dispense: 90 Tab; Refill: 3  Continue monitor home BP    2. Keratosis  Will observe any changes in growth or symptoms of sensation/bleeding will prompt bx.     3. Recurrent major depressive disorder, in partial remission (HCC)  Continue Celexa.      3 month " follow up

## 2020-06-11 ENCOUNTER — OFFICE VISIT (OUTPATIENT)
Dept: MEDICAL GROUP | Facility: PHYSICIAN GROUP | Age: 55
End: 2020-06-11
Payer: COMMERCIAL

## 2020-06-11 ENCOUNTER — HOSPITAL ENCOUNTER (OUTPATIENT)
Dept: LAB | Facility: MEDICAL CENTER | Age: 55
End: 2020-06-11
Attending: ANESTHESIOLOGY
Payer: COMMERCIAL

## 2020-06-11 VITALS
DIASTOLIC BLOOD PRESSURE: 80 MMHG | HEIGHT: 68 IN | SYSTOLIC BLOOD PRESSURE: 122 MMHG | WEIGHT: 236.8 LBS | TEMPERATURE: 97.8 F | HEART RATE: 84 BPM | OXYGEN SATURATION: 95 % | RESPIRATION RATE: 16 BRPM | BODY MASS INDEX: 35.89 KG/M2

## 2020-06-11 DIAGNOSIS — Z01.810 PREOPERATIVE CARDIOVASCULAR EXAMINATION: ICD-10-CM

## 2020-06-11 DIAGNOSIS — I10 ESSENTIAL HYPERTENSION: ICD-10-CM

## 2020-06-11 DIAGNOSIS — N40.1 BENIGN PROSTATIC HYPERPLASIA WITH LOWER URINARY TRACT SYMPTOMS, SYMPTOM DETAILS UNSPECIFIED: ICD-10-CM

## 2020-06-11 DIAGNOSIS — E78.5 HYPERLIPIDEMIA, UNSPECIFIED HYPERLIPIDEMIA TYPE: ICD-10-CM

## 2020-06-11 LAB
ANION GAP SERPL CALC-SCNC: 12 MMOL/L (ref 7–16)
BUN SERPL-MCNC: 11 MG/DL (ref 8–22)
CALCIUM SERPL-MCNC: 9.3 MG/DL (ref 8.5–10.5)
CHLORIDE SERPL-SCNC: 102 MMOL/L (ref 96–112)
CO2 SERPL-SCNC: 26 MMOL/L (ref 20–33)
CREAT SERPL-MCNC: 0.85 MG/DL (ref 0.5–1.4)
EKG 4674: NORMAL
FASTING STATUS PATIENT QL REPORTED: NORMAL
GLUCOSE SERPL-MCNC: 96 MG/DL (ref 65–99)
POTASSIUM SERPL-SCNC: 3.8 MMOL/L (ref 3.6–5.5)
SODIUM SERPL-SCNC: 140 MMOL/L (ref 135–145)

## 2020-06-11 PROCEDURE — 93000 ELECTROCARDIOGRAM COMPLETE: CPT | Performed by: FAMILY MEDICINE

## 2020-06-11 PROCEDURE — 80048 BASIC METABOLIC PNL TOTAL CA: CPT

## 2020-06-11 PROCEDURE — 36415 COLL VENOUS BLD VENIPUNCTURE: CPT

## 2020-06-11 PROCEDURE — 99214 OFFICE O/P EST MOD 30 MIN: CPT | Performed by: FAMILY MEDICINE

## 2020-06-11 RX ORDER — FINASTERIDE 5 MG/1
5 TABLET, FILM COATED ORAL DAILY
Qty: 90 TAB | Refills: 3 | Status: SHIPPED | OUTPATIENT
Start: 2020-06-11 | End: 2020-08-18 | Stop reason: SDUPTHER

## 2020-06-11 RX ORDER — TAMSULOSIN HYDROCHLORIDE 0.4 MG/1
0.4 CAPSULE ORAL
Qty: 90 CAP | Refills: 3 | Status: SHIPPED | OUTPATIENT
Start: 2020-06-11 | End: 2020-08-18 | Stop reason: SDUPTHER

## 2020-06-11 RX ORDER — ATORVASTATIN CALCIUM 40 MG/1
40 TABLET, FILM COATED ORAL
Qty: 90 TAB | Refills: 3 | Status: SHIPPED | OUTPATIENT
Start: 2020-06-11 | End: 2021-09-21 | Stop reason: SDUPTHER

## 2020-06-11 ASSESSMENT — FIBROSIS 4 INDEX: FIB4 SCORE: 1

## 2020-06-11 NOTE — PROGRESS NOTES
Chief Complaint   Patient presents with   • Hypertension     fv    • Other     surgery clearance;lft shoulder       HISTORY OF PRESENT ILLNESS: Patient is a 55 y.o. male established patient here today for the following concerns:    This is a pleasant 55 year old male with hx of HTN, DLD and BPH who has been in relatively good health, but has had some chronic shoulder pain found to have rotator cuff tear and is anticipated to undergo arthroscopic debridement and repair.  He needs an EKG as part of his preoperative clearance.  He has been adherent to his medications without problem.  No HA, dizziness, leg swelling or CP.  Has started on a weight loss plan by reducing portion sizes.      Past Medical, Social, and Family history reviewed and updated in EPIC    Allergies:Patient has no known allergies.    Current Outpatient Medications   Medication Sig Dispense Refill   • tamsulosin (FLOMAX) 0.4 MG capsule Take 1 Cap by mouth ONE-HALF HOUR AFTER BREAKFAST. 90 Cap 3   • finasteride (PROSCAR) 5 MG Tab Take 1 Tab by mouth every day. 90 Tab 3   • atorvastatin (LIPITOR) 40 MG Tab Take 1 Tab by mouth every day. 90 Tab 3   • olmesartan-hydrochlorothiazide (BENICAR HCT) 40-12.5 MG per tablet Take 1 Tab by mouth every day. 90 Tab 3   • citalopram (CELEXA) 20 MG Tab Take 1 Tab by mouth every day. 90 Tab 3   • aspirin (ASA) 325 MG TABS Take 325 mg by mouth every day.     • docosahexanoic acid (FISH OIL) 1000 MG CAPS Take 1,000 mg by mouth 2 Times a Day.     • vitamin D (CHOLECALCIFEROL) 1000 UNIT TABS Take 5,000 Units by mouth every day.       No current facility-administered medications for this visit.          ROS:  Review of Systems   Constitutional: Negative for fever, chills, weight loss and malaise/fatigue.   HENT: Negative for ear pain, nosebleeds, congestion, sore throat and neck pain.    Eyes: Negative for blurred vision.   Respiratory: Negative for cough, sputum production, shortness of breath and wheezing.   "  Cardiovascular: Negative for chest pain, palpitations,  and leg swelling.   Gastrointestinal: Negative for heartburn, nausea, vomiting, diarrhea and abdominal pain.   Genitourinary: Negative for dysuria, urgency and frequency.   Musculoskeletal: Negative for myalgias, back pain and joint pain.   Skin: Negative for rash and itching.   Neurological: Negative for dizziness, tingling, tremors, sensory change, focal weakness and headaches.   Endo/Heme/Allergies: Does not bruise/bleed easily.   Psychiatric/Behavioral: Negative for depression, anxiety, suicidal ideas, insomnia and memory loss.      Exam:  /80   Pulse 84   Temp 36.6 °C (97.8 °F)   Resp 16   Ht 1.727 m (5' 8\")   Wt 107.4 kg (236 lb 12.8 oz)   SpO2 95%     General:  Well nourished, well developed in NAD  Head is grossly normal.  Neck: Supple without JVD   Pulmonary:  Normal effort. CTAB  Cardiovascular: Regular rate and rhythm no m/r/g  Extremities: no clubbing, cyanosis, or edema.  Psych: affect appropriate      Please note that this dictation was created using voice recognition software. I have made every reasonable attempt to correct obvious errors, but I expect that there are errors of grammar and possibly content that I did not discover before finalizing the note.    Assessment/Plan:  1. Essential hypertension  Controlled on current meds.   - EKG    2. Preoperative cardiovascular examination  Doing well  - EKG today shows NSR no ST/T wave changes normal axis and intervals.      3. Benign prostatic hyperplasia with lower urinary tract symptoms, symptom details unspecified    - tamsulosin (FLOMAX) 0.4 MG capsule; Take 1 Cap by mouth ONE-HALF HOUR AFTER BREAKFAST.  Dispense: 90 Cap; Refill: 3  - finasteride (PROSCAR) 5 MG Tab; Take 1 Tab by mouth every day.  Dispense: 90 Tab; Refill: 3    4. Hyperlipidemia, unspecified hyperlipidemia type  - atorvastatin (LIPITOR) 40 MG Tab; Take 1 Tab by mouth every day.  Dispense: 90 Tab; Refill: 3    Follow " up in 6 months sooner prn.

## 2020-07-08 RX ORDER — IBUPROFEN 800 MG/1
TABLET ORAL
Qty: 30 TAB | Refills: 1 | OUTPATIENT
Start: 2020-07-08

## 2020-07-14 RX ORDER — IBUPROFEN 800 MG/1
TABLET ORAL
Qty: 30 TAB | Refills: 1 | OUTPATIENT
Start: 2020-07-14

## 2020-07-17 RX ORDER — IBUPROFEN 800 MG/1
TABLET ORAL
Qty: 30 TAB | Refills: 1 | Status: SHIPPED | OUTPATIENT
Start: 2020-07-17 | End: 2021-06-29

## 2020-08-18 DIAGNOSIS — N40.1 BENIGN PROSTATIC HYPERPLASIA WITH LOWER URINARY TRACT SYMPTOMS, SYMPTOM DETAILS UNSPECIFIED: ICD-10-CM

## 2020-08-18 RX ORDER — FINASTERIDE 5 MG/1
5 TABLET, FILM COATED ORAL DAILY
Qty: 90 TAB | Refills: 3 | Status: SHIPPED | OUTPATIENT
Start: 2020-08-18 | End: 2021-10-11

## 2020-08-18 RX ORDER — TAMSULOSIN HYDROCHLORIDE 0.4 MG/1
0.4 CAPSULE ORAL
Qty: 90 CAP | Refills: 3 | Status: SHIPPED | OUTPATIENT
Start: 2020-08-18 | End: 2021-10-11

## 2020-08-18 NOTE — TELEPHONE ENCOUNTER
----- Message from Lawrence Garrett sent at 8/18/2020  1:51 PM PDT -----  Regarding: Prescription Question  Contact: [phone]  Please update my Pharmacy to [facility] [address]     Please send a refill prescription for Tamsulosin and Finasteride to the [facility]     Thanks   Shahzad Garrett

## 2020-09-28 DIAGNOSIS — I10 ESSENTIAL HYPERTENSION: ICD-10-CM

## 2020-09-28 RX ORDER — OLMESARTAN MEDOXOMIL AND HYDROCHLOROTHIAZIDE 40/12.5 40; 12.5 MG/1; MG/1
1 TABLET ORAL DAILY
Qty: 90 TAB | Refills: 3 | Status: SHIPPED | OUTPATIENT
Start: 2020-09-28 | End: 2020-09-29 | Stop reason: SDUPTHER

## 2020-09-28 NOTE — TELEPHONE ENCOUNTER
----- Message from Lawrence Garrett sent at 9/28/2020 12:06 PM PDT -----  Regarding: Prescription Question  Contact: [phone]  Olmesartan-Hctz 40-12.5    I'm ready for a new refill prescription - Can you send a prescription to [facility].    Thanks Lawrence Garrett

## 2020-09-29 DIAGNOSIS — I10 ESSENTIAL HYPERTENSION: ICD-10-CM

## 2020-09-29 RX ORDER — OLMESARTAN MEDOXOMIL AND HYDROCHLOROTHIAZIDE 40/12.5 40; 12.5 MG/1; MG/1
1 TABLET ORAL DAILY
Qty: 90 TAB | Refills: 3 | Status: SHIPPED | OUTPATIENT
Start: 2020-09-29 | End: 2021-10-11

## 2020-09-29 NOTE — TELEPHONE ENCOUNTER
----- Message from Lawrence Garrett sent at 9/29/2020 12:25 PM PDT -----  Regarding: RE: Prescription Question  Contact: [phone]  I got a call from [facility] [place] about my prescription for Olmesartan is ready.  Can I get that redirected to the [facility] [address]?    Thanks Ed    ----- Message -----  From: Gloria Bangura, Tristan Ass't  Sent: 9/28/20, 1:32 PM  To: Lawrence Garrett  Subject: RE: Prescription Question    Dear Lawrence    Your medication request has been submitted.    We will contact you when it has been approved.    Take Care      ----- Message -----       From:Lawrence Garrett       Sent:9/28/2020 12:06 PM PDT         To:Loraine Hendrix M.D.    Subject:Prescription Question    Olmesartan-Hctz 40-12.5    I'm ready for a new refill prescription - Can you send a prescription to [facility] [address].    Thanks Lawrence Garrett

## 2020-10-08 ENCOUNTER — TELEPHONE (OUTPATIENT)
Dept: MEDICAL GROUP | Facility: PHYSICIAN GROUP | Age: 55
End: 2020-10-08

## 2020-10-08 DIAGNOSIS — E78.5 HYPERLIPIDEMIA, UNSPECIFIED HYPERLIPIDEMIA TYPE: ICD-10-CM

## 2020-10-08 NOTE — TELEPHONE ENCOUNTER
atorvastatin (LIPITOR) 40 MG Tab [[id number]]     Order Details  Dose: 40 mg Route: Oral Frequency: EVERY DAY   Dispense Quantity: 90 Tab Refills: 3 Fills remaining: --           Sig: Take 1 Tab by mouth every day.          Written Date: 06/11/20 Expiration Date: 06/11/21

## 2021-03-08 ENCOUNTER — OFFICE VISIT (OUTPATIENT)
Dept: MEDICAL GROUP | Facility: PHYSICIAN GROUP | Age: 56
End: 2021-03-08
Payer: COMMERCIAL

## 2021-03-08 VITALS
HEART RATE: 84 BPM | DIASTOLIC BLOOD PRESSURE: 84 MMHG | HEIGHT: 68 IN | SYSTOLIC BLOOD PRESSURE: 148 MMHG | OXYGEN SATURATION: 96 % | TEMPERATURE: 97.2 F | WEIGHT: 244 LBS | BODY MASS INDEX: 36.98 KG/M2 | RESPIRATION RATE: 16 BRPM

## 2021-03-08 DIAGNOSIS — M79.631 RIGHT FOREARM PAIN: ICD-10-CM

## 2021-03-08 DIAGNOSIS — Z96.611 PRESENCE OF RIGHT ARTIFICIAL SHOULDER JOINT: ICD-10-CM

## 2021-03-08 DIAGNOSIS — G89.29 CHRONIC RIGHT SHOULDER PAIN: Primary | ICD-10-CM

## 2021-03-08 DIAGNOSIS — M25.511 CHRONIC RIGHT SHOULDER PAIN: Primary | ICD-10-CM

## 2021-03-08 DIAGNOSIS — M25.512 CHRONIC LEFT SHOULDER PAIN: ICD-10-CM

## 2021-03-08 DIAGNOSIS — Z00.00 WELL ADULT EXAM: ICD-10-CM

## 2021-03-08 DIAGNOSIS — G89.29 CHRONIC LEFT SHOULDER PAIN: ICD-10-CM

## 2021-03-08 DIAGNOSIS — R31.9 HEMATURIA, UNSPECIFIED TYPE: ICD-10-CM

## 2021-03-08 DIAGNOSIS — N40.0 ENLARGED PROSTATE: ICD-10-CM

## 2021-03-08 PROCEDURE — 99214 OFFICE O/P EST MOD 30 MIN: CPT | Performed by: NURSE PRACTITIONER

## 2021-03-08 SDOH — ECONOMIC STABILITY: HOUSING INSECURITY: IN THE LAST 12 MONTHS, HOW MANY PLACES HAVE YOU LIVED?: 1

## 2021-03-08 SDOH — ECONOMIC STABILITY: HOUSING INSECURITY
IN THE LAST 12 MONTHS, WAS THERE A TIME WHEN YOU DID NOT HAVE A STEADY PLACE TO SLEEP OR SLEPT IN A SHELTER (INCLUDING NOW)?: NO

## 2021-03-08 SDOH — ECONOMIC STABILITY: INCOME INSECURITY: IN THE LAST 12 MONTHS, WAS THERE A TIME WHEN YOU WERE NOT ABLE TO PAY THE MORTGAGE OR RENT ON TIME?: NO

## 2021-03-08 SDOH — HEALTH STABILITY: MENTAL HEALTH
STRESS IS WHEN SOMEONE FEELS TENSE, NERVOUS, ANXIOUS, OR CAN'T SLEEP AT NIGHT BECAUSE THEIR MIND IS TROUBLED. HOW STRESSED ARE YOU?: NOT AT ALL

## 2021-03-08 SDOH — HEALTH STABILITY: PHYSICAL HEALTH: ON AVERAGE, HOW MANY MINUTES DO YOU ENGAGE IN EXERCISE AT THIS LEVEL?: 40 MINUTES

## 2021-03-08 SDOH — HEALTH STABILITY: PHYSICAL HEALTH: ON AVERAGE, HOW MANY DAYS PER WEEK DO YOU ENGAGE IN MODERATE TO STRENUOUS EXERCISE (LIKE A BRISK WALK)?: 5 DAYS

## 2021-03-08 SDOH — ECONOMIC STABILITY: TRANSPORTATION INSECURITY
IN THE PAST 12 MONTHS, HAS THE LACK OF TRANSPORTATION KEPT YOU FROM MEDICAL APPOINTMENTS OR FROM GETTING MEDICATIONS?: NO

## 2021-03-08 SDOH — ECONOMIC STABILITY: TRANSPORTATION INSECURITY
IN THE PAST 12 MONTHS, HAS LACK OF RELIABLE TRANSPORTATION KEPT YOU FROM MEDICAL APPOINTMENTS, MEETINGS, WORK OR FROM GETTING THINGS NEEDED FOR DAILY LIVING?: NO

## 2021-03-08 ASSESSMENT — SOCIAL DETERMINANTS OF HEALTH (SDOH)
HOW MANY DRINKS CONTAINING ALCOHOL DO YOU HAVE ON A TYPICAL DAY WHEN YOU ARE DRINKING: 1 OR 2
WITHIN THE PAST 12 MONTHS, THE FOOD YOU BOUGHT JUST DIDN'T LAST AND YOU DIDN'T HAVE MONEY TO GET MORE: NEVER TRUE
HOW OFTEN DO YOU HAVE A DRINK CONTAINING ALCOHOL: 2-3 TIMES A WEEK
HOW OFTEN DO YOU GET TOGETHER WITH FRIENDS OR RELATIVES?: ONCE A WEEK
HOW HARD IS IT FOR YOU TO PAY FOR THE VERY BASICS LIKE FOOD, HOUSING, MEDICAL CARE, AND HEATING?: NOT VERY HARD
HOW OFTEN DO YOU ATTENT MEETINGS OF THE CLUB OR ORGANIZATION YOU BELONG TO?: MORE THAN 4 TIMES PER YEAR
DO YOU BELONG TO ANY CLUBS OR ORGANIZATIONS SUCH AS CHURCH GROUPS UNIONS, FRATERNAL OR ATHLETIC GROUPS, OR SCHOOL GROUPS?: YES
IN A TYPICAL WEEK, HOW MANY TIMES DO YOU TALK ON THE PHONE WITH FAMILY, FRIENDS, OR NEIGHBORS?: ONCE A WEEK
HOW OFTEN DO YOU HAVE SIX OR MORE DRINKS ON ONE OCCASION: NEVER
HOW OFTEN DO YOU ATTEND CHURCH OR RELIGIOUS SERVICES?: MORE THAN 4 TIMES PER YEAR
WITHIN THE PAST 12 MONTHS, YOU WORRIED THAT YOUR FOOD WOULD RUN OUT BEFORE YOU GOT THE MONEY TO BUY MORE: NEVER TRUE

## 2021-03-08 ASSESSMENT — PATIENT HEALTH QUESTIONNAIRE - PHQ9
2. FEELING DOWN, DEPRESSED, IRRITABLE, OR HOPELESS: NOT AT ALL
5. POOR APPETITE OR OVEREATING: NOT AT ALL
7. TROUBLE CONCENTRATING ON THINGS, SUCH AS READING THE NEWSPAPER OR WATCHING TELEVISION: NOT AT ALL
8. MOVING OR SPEAKING SO SLOWLY THAT OTHER PEOPLE COULD HAVE NOTICED. OR THE OPPOSITE, BEING SO FIGETY OR RESTLESS THAT YOU HAVE BEEN MOVING AROUND A LOT MORE THAN USUAL: NOT AT ALL
4. FEELING TIRED OR HAVING LITTLE ENERGY: NOT AT ALL
9. THOUGHTS THAT YOU WOULD BE BETTER OFF DEAD, OR OF HURTING YOURSELF: NOT AT ALL
SUM OF ALL RESPONSES TO PHQ9 QUESTIONS 1 AND 2: 0
SUM OF ALL RESPONSES TO PHQ QUESTIONS 1-9: 0
6. FEELING BAD ABOUT YOURSELF - OR THAT YOU ARE A FAILURE OR HAVE LET YOURSELF OR YOUR FAMILY DOWN: NOT AL ALL
3. TROUBLE FALLING OR STAYING ASLEEP OR SLEEPING TOO MUCH: NOT AT ALL
1. LITTLE INTEREST OR PLEASURE IN DOING THINGS: NOT AT ALL

## 2021-03-08 ASSESSMENT — FIBROSIS 4 INDEX: FIB4 SCORE: 1

## 2021-03-08 NOTE — ASSESSMENT & PLAN NOTE
Chronic condition.  Patient had left shoulder surgery with Dr. Huang in May 2020.  He attended physical therapy and is much improved.  He reports that he has a pain in the posterior aspect of his left shoulder since his surgery.  Dr. Huang has evaluated this and performed steroid injections, per patient.  He reports persistent pain despite this, and would like further work-up to determine what is going on.

## 2021-03-08 NOTE — ASSESSMENT & PLAN NOTE
Chronic condition, worsening.  Patient tells me that his right shoulder is now feeling very similar to his left shoulder prior to arthroscopic intervention with Dr. Huang. He cannot sleep on his right side without pain. He has weakness with abduction, lateral rotation, lifting or pulling motions.

## 2021-03-08 NOTE — ASSESSMENT & PLAN NOTE
New condition.  Patient reports a popping and clicking noise when he twists his right forearm.  He states this is not painful, but would like to know what this is.  There is no swelling, deformity or bruising.  No pain with palpation when he twists the arm though I do hear the audible clicking.

## 2021-03-08 NOTE — PROGRESS NOTES
Subjective:     CC: Establish care, persistent right shoulder pain.    HPI:   Lawrence presents today to establish care with me.  He is a prior patient of Dr. Hendrix.  His past medical, social, family, and surgical history were reviewed today.  He has not been in to see any physicians due to the pandemic, and has a couple of issues he wants to talk about today.  He has an odd, right forearm clicking noise when he rotates his forearm.  This is not painful to him but he is concerned about what it might be.  Next, he has residual posterior left shoulder pain after arthroscopic shoulder surgery in 2020.  He did go to physical therapy, and has been reevaluated by his surgeon, and provided steroid injections.  Despite this the pain persists.  Patient reports that since his left shoulder has been repaired, his right shoulder has deteriorated.  It feels nearly identical to how his left shoulder was feeling prior to surgery.  He is having difficulty sleeping on his right side, pushing and pulling, and has pain with internal and external rotation.  He has not tried physical therapy or had any imaging for this at this point.  Lastly, the patient would like to talk about discontinuing his medications that he takes for his enlarged prostate.  He was placed on these in 2019 after an episode of what he thinks may have been passing a kidney stone.  He does not believe he ever had any symptoms related to an enlarged prostate.  He feels that the incident was isolated with blood in his urine 1 time, which resolved by the next day.    Past Medical History:   Diagnosis Date   • Anxiety    • Dyslipidemia 11/17/2010   • Elevated liver function tests 11/17/2010   • HTN (hypertension) 11/17/2010   • Hyperlipidemia    • Hypertension        Social History     Tobacco Use   • Smoking status: Never Smoker   • Smokeless tobacco: Never Used   Substance Use Topics   • Alcohol use: Yes     Alcohol/week: 0.0 oz     Comment: rare   • Drug use: No  "      Current Outpatient Medications Ordered in Epic   Medication Sig Dispense Refill   • olmesartan-hydrochlorothiazide (BENICAR HCT) 40-12.5 MG per tablet Take 1 Tab by mouth every day. 90 Tab 3   • tamsulosin (FLOMAX) 0.4 MG capsule Take 1 Cap by mouth ONE-HALF HOUR AFTER BREAKFAST. 90 Cap 3   • finasteride (PROSCAR) 5 MG Tab Take 1 Tab by mouth every day. 90 Tab 3   • ibuprofen (MOTRIN) 800 MG Tab TAKE 1 TAB BY MOUTH EVERY 8 HOURS AS NEEDED. FOR MILD PAIN 30 Tab 1   • atorvastatin (LIPITOR) 40 MG Tab Take 1 Tab by mouth every day. 90 Tab 3   • citalopram (CELEXA) 20 MG Tab Take 1 Tab by mouth every day. 90 Tab 3   • aspirin (ASA) 325 MG TABS Take 325 mg by mouth every day.     • docosahexanoic acid (FISH OIL) 1000 MG CAPS Take 1,000 mg by mouth 2 Times a Day.     • vitamin D (CHOLECALCIFEROL) 1000 UNIT TABS Take 5,000 Units by mouth every day.       No current [facility]-ordered facility-administered medications on file.       Allergies:  Patient has no known allergies.    Health Maintenance: Completed    ROS:  Gen: no fevers/chills, no changes in weight  Eyes: no changes in vision  ENT: no sore throat, no hearing loss, no bloody nose  Pulm: no sob, no cough  CV: no chest pain, no palpitations  GI: no nausea/vomiting, no diarrhea  : no dysuria  MSk: no myalgias  Skin: no rash  Neuro: no headaches, no numbness/tingling  Heme/Lymph: no easy bruising      Objective:       Exam:  /84 (BP Location: Left arm, Patient Position: Sitting, BP Cuff Size: Adult)   Pulse 84   Temp 36.2 °C (97.2 °F) (Temporal)   Resp 16   Ht 1.727 m (5' 8\")   Wt 111 kg (244 lb)   SpO2 96%   BMI 37.10 kg/m²  Body mass index is 37.1 kg/m².    Gen: Alert and oriented, No apparent distress.  Neck: Neck is supple without lymphadenopathy.  Lungs: Normal effort, CTA bilaterally, no wheezes, rhonchi, or rales  CV: Regular rate and rhythm. No murmurs, rubs, or gallops.  Ext: No clubbing, cyanosis, edema.  Right forearm, no tenderness or " deformity to palpation.  Audible crepitus with rotation.  Right shoulder with limited range of motion due to pain, decreased strength with abduction and forward flexion. Normal sensation.  Left shoulder with pain to palpation over the pectoralis. Normal strength and sensation. ROM is full.    Labs: None.    Assessment & Plan:     55 y.o. male with the following -     1. Chronic right shoulder pain  Chronic condition, worsening.  Patient tells me that his right shoulder is now feeling very similar to his left shoulder prior to arthroscopic intervention with Dr. Huang. He cannot sleep on his right side without pain. He has weakness with abduction, lateral rotation, lifting or pulling motions.  - REFERRAL TO PHYSICAL THERAPY  - REFERRAL TO ORTHOPEDICS  - MR-SHOULDER-W/O RIGHT; Future    2. Chronic left shoulder pain  Chronic condition.  Patient had left shoulder surgery with Dr. Huang in May 2020.  He attended physical therapy and is much improved.  He reports that he has a pain in the posterior aspect of his left shoulder since his surgery.  Dr. Huang has evaluated this and performed steroid injections, per patient.  He reports persistent pain despite this, and would like further work-up to determine what is going on.  - MR-SHOULDER-W/O LEFT; Future  - REFERRAL TO ORTHOPEDICS    4. Right forearm pain  New condition.  Patient reports a popping and clicking noise when he twists his right forearm.  He states this is not painful, but would like to know what this is.  There is no swelling, deformity or bruising.  No pain with palpation when he twists the arm though I do hear the audible clicking.  - DX-FOREARM RIGHT; Future    5. Well adult exam  Due for updated labs in June 2021  - CBC WITH DIFFERENTIAL; Future  - HEMOGLOBIN A1C; Future  - Comp Metabolic Panel; Future  - Lipid Profile; Future  - TSH; Future  - PROSTATE SPECIFIC AG SCREENING; Future  - VITAMIN D,25 HYDROXY; Future    6. Hematuria, unspecified  "type  - URINALYSIS; Future    7. Enlarged prostate  Patient has a history of one episode of hematuria and feeling a \"blockage\" in his urinary tract. Upon further investigation, he was noted to have an enlarged prostate with some urinary retention.  He was placed on finasteride and tamsulosin at that time, and has no symptoms since. Patient has never had an elevated PSA.  He would like to discontinue these medications, and wonders if it is safe to do so.  Discussed with patient that he may discontinue them at this time. If his symptoms return, then he will need to restart the medications.  Patient verbalized understanding.       Patient will follow up in June of 2021 with annual labs, sooner for any new concerns.     Please note that this dictation was created using voice recognition software. I have made every reasonable attempt to correct obvious errors, but I expect that there are errors of grammar and possibly content that I did not discover before finalizing the note.  "

## 2021-03-08 NOTE — ASSESSMENT & PLAN NOTE
"Patient has a history of one episode of hematuria and feeling a \"blockage\" in his urinary tract. Upon further investigation, he was noted to have an enlarged prostate with some urinary retention.  He was placed on finasteride and tamsulosin at that time, and has no symptoms since. Patient has never had an elevated PSA.  He would like to discontinue these medications, and wonders if it is safe to do so.  Discussed with patient that he may discontinue them at this time. If his symptoms return, then he will need to restart the medications.  Patient verbalized understanding.   "

## 2021-03-15 DIAGNOSIS — Z23 NEED FOR VACCINATION: ICD-10-CM

## 2021-03-20 ENCOUNTER — HOSPITAL ENCOUNTER (OUTPATIENT)
Dept: RADIOLOGY | Facility: MEDICAL CENTER | Age: 56
End: 2021-03-20
Attending: NURSE PRACTITIONER
Payer: COMMERCIAL

## 2021-03-20 DIAGNOSIS — M25.511 CHRONIC RIGHT SHOULDER PAIN: ICD-10-CM

## 2021-03-20 DIAGNOSIS — M79.631 RIGHT FOREARM PAIN: ICD-10-CM

## 2021-03-20 DIAGNOSIS — M25.512 CHRONIC LEFT SHOULDER PAIN: ICD-10-CM

## 2021-03-20 DIAGNOSIS — G89.29 CHRONIC RIGHT SHOULDER PAIN: ICD-10-CM

## 2021-03-20 DIAGNOSIS — G89.29 CHRONIC LEFT SHOULDER PAIN: ICD-10-CM

## 2021-03-20 PROCEDURE — 73221 MRI JOINT UPR EXTREM W/O DYE: CPT | Mod: RT

## 2021-03-20 PROCEDURE — 73221 MRI JOINT UPR EXTREM W/O DYE: CPT | Mod: LT

## 2021-03-20 PROCEDURE — 73090 X-RAY EXAM OF FOREARM: CPT | Mod: RT

## 2021-06-29 RX ORDER — CITALOPRAM 20 MG/1
20 TABLET ORAL
Qty: 90 TABLET | Refills: 3 | Status: SHIPPED | OUTPATIENT
Start: 2021-06-29 | End: 2022-10-24

## 2021-08-30 ENCOUNTER — TELEMEDICINE (OUTPATIENT)
Dept: MEDICAL GROUP | Facility: PHYSICIAN GROUP | Age: 56
End: 2021-08-30
Payer: COMMERCIAL

## 2021-08-30 VITALS — WEIGHT: 230 LBS | BODY MASS INDEX: 34.86 KG/M2 | HEIGHT: 68 IN

## 2021-08-30 DIAGNOSIS — G89.29 CHRONIC RIGHT SHOULDER PAIN: ICD-10-CM

## 2021-08-30 DIAGNOSIS — E78.5 DYSLIPIDEMIA: Primary | ICD-10-CM

## 2021-08-30 DIAGNOSIS — N40.0 ENLARGED PROSTATE: ICD-10-CM

## 2021-08-30 DIAGNOSIS — M25.511 CHRONIC RIGHT SHOULDER PAIN: ICD-10-CM

## 2021-08-30 DIAGNOSIS — I10 ESSENTIAL HYPERTENSION: ICD-10-CM

## 2021-08-30 DIAGNOSIS — E78.1 HYPERTRIGLYCERIDEMIA: ICD-10-CM

## 2021-08-30 DIAGNOSIS — R74.8 ELEVATED SERUM GGT LEVEL: ICD-10-CM

## 2021-08-30 PROCEDURE — 99214 OFFICE O/P EST MOD 30 MIN: CPT | Mod: 95 | Performed by: NURSE PRACTITIONER

## 2021-08-30 RX ORDER — AMLODIPINE BESYLATE 5 MG/1
5 TABLET ORAL DAILY
Qty: 30 TABLET | Refills: 3 | Status: SHIPPED
Start: 2021-08-30 | End: 2021-09-21

## 2021-08-30 RX ORDER — FENOFIBRATE 145 MG/1
145 TABLET, COATED ORAL DAILY
Qty: 90 TABLET | Refills: 3 | Status: SHIPPED | OUTPATIENT
Start: 2021-08-30 | End: 2022-09-06

## 2021-08-30 NOTE — ASSESSMENT & PLAN NOTE
Chronic condition, stable.  Patient reports that he did have an injection into his shoulder with Dr. Huang which provided relief for about four weeks.  He states that he feels that he would like to proceed with surgical repair at this time.  He is not sure if he needs referral back to Dr. Huang or not for this, but will place one today in case he needs one.

## 2021-08-31 NOTE — ASSESSMENT & PLAN NOTE
Patient noted to have elevated GGT level of 73 and bilirubin of 2.0 on labs done through his work on 8/4/2021.  Discussed further workup with abdominal ultrasound and possible referral to GI.  Patient in agreement with this plan.

## 2021-08-31 NOTE — ASSESSMENT & PLAN NOTE
Chronic condition, worsening.  Patient reports that he is not taking his blood pressures at home, but that he just found his cuff and will start doing this. The last two readings I have from him are 148/84 and on his health check from his employer it was 150/90 x2 and 148/84 x1.  Discussed with patient that it appears his blood pressure is not well controlled at this time.  Will add amlodipine 5 mg daily to his regimen. Patient will take his blood pressure daily and he will see me back in two weeks for recheck with his blood pressure log available.

## 2021-08-31 NOTE — ASSESSMENT & PLAN NOTE
Chronic condition, stable.  Patient discontinued his tamsulosin and his finasteride several months ago, after feeling like he may not need them.  However, he states that he is having a return of symptoms and would like to restart these medications.  Patient would also like a referral to urology, and this was placed for him today.

## 2021-08-31 NOTE — PROGRESS NOTES
Virtual Visit: Established Patient   This visit was conducted via Zoom using secure and encrypted videoconferencing technology. The patient was in a private location in the state of [place].    The patient's identity was confirmed and verbal consent was obtained for this virtual visit.    Subjective:   CC:   Chief Complaint   Patient presents with   • Lab Follow-up   • Medication Management     BP Medication Evaluation   • Shoulder Pain     right shoulder px   • Medication Management     Fosamax questions       Lawrence Garrett is a 56 y.o. male presenting for evaluation and management of:    Chronic right shoulder pain  Chronic condition, stable.  Patient reports that he did have an injection into his shoulder with Dr. Huang which provided relief for about four weeks.  He states that he feels that he would like to proceed with surgical repair at this time.  He is not sure if he needs referral back to Dr. Huang or not for this, but will place one today in case he needs one.     Enlarged prostate  Chronic condition, stable.  Patient discontinued his tamsulosin and his finasteride several months ago, after feeling like he may not need them.  However, he states that he is having a return of symptoms and would like to restart these medications.  Patient would also like a referral to urology, and this was placed for him today.     HTN (hypertension)  Chronic condition, worsening.  Patient reports that he is not taking his blood pressures at home, but that he just found his cuff and will start doing this. The last two readings I have from him are 148/84 and on his health check from his employer it was 150/90 x2 and 148/84 x1.  Discussed with patient that it appears his blood pressure is not well controlled at this time.  Will add amlodipine 5 mg daily to his regimen. Patient will take his blood pressure daily and he will see me back in two weeks for recheck with his blood pressure log available.       Dyslipidemia  Chronic condition, stable.  Patient did have recent labs through his work.  His total cholesterol is 137, triglycerides 226, HDL 51, LDL 40 on 8/4/2021.  Patient reports concern over his high triglycerides. He is taking atorvastatin 40 mg daily and tolerates this well without myalgias/arthralgias.  Discussed adding fenofibrate to his regimen and patient would like to try this. Will repeat lipid panel in three months.     Elevated serum GGT level  Patient noted to have elevated GGT level of 73 and bilirubin of 2.0 on labs done through his work on 8/4/2021.  Discussed further workup with abdominal ultrasound and possible referral to GI.  Patient in agreement with this plan.        ROS   Denies any recent fevers or chills. No nausea or vomiting. No chest pains or shortness of breath.     No Known Allergies    Current medicines (including changes today)  Current Outpatient Medications   Medication Sig Dispense Refill   • fenofibrate (TRICOR) 145 MG Tab Take 1 Tablet by mouth every day. 90 Tablet 3   • amLODIPine (NORVASC) 5 MG Tab Take 1 Tablet by mouth every day. 30 Tablet 3   • citalopram (CELEXA) 20 MG Tab Take 1 tablet by mouth every day. 90 tablet 3   • olmesartan-hydrochlorothiazide (BENICAR HCT) 40-12.5 MG per tablet Take 1 Tab by mouth every day. 90 Tab 3   • finasteride (PROSCAR) 5 MG Tab Take 1 Tab by mouth every day. 90 Tab 3   • atorvastatin (LIPITOR) 40 MG Tab Take 1 Tab by mouth every day. 90 Tab 3   • aspirin (ASA) 325 MG TABS Take 325 mg by mouth every day.     • docosahexanoic acid (FISH OIL) 1000 MG CAPS Take 1,000 mg by mouth 2 Times a Day.     • vitamin D (CHOLECALCIFEROL) 1000 UNIT TABS Take 5,000 Units by mouth every day.     • tamsulosin (FLOMAX) 0.4 MG capsule Take 1 Cap by mouth ONE-HALF HOUR AFTER BREAKFAST. (Patient not taking: Reported on 8/30/2021) 90 Cap 3     No current facility-administered medications for this visit.       Patient Active Problem List    Diagnosis Date  "Noted   • Elevated serum GGT level 08/30/2021   • Chronic right shoulder pain 03/08/2021   • Right forearm pain 03/08/2021   • Enlarged prostate 03/08/2021   • Obesity (BMI 35.0-39.9 without comorbidity) (HCC) 06/25/2019   • Hematuria 04/23/2019   • Chronic midline low back pain 04/23/2019   • Neck pain 04/23/2019   • Chronic left shoulder pain 04/23/2019   • Depression 05/31/2016   • Rupture of right gastrocnemius tendon 07/08/2015   • Sea sickness 07/08/2014   • HTN (hypertension) 11/17/2010   • Anxiety 11/17/2010   • Dyslipidemia 11/17/2010       Family History   Problem Relation Age of Onset   • Heart Disease Father 70        CHF       He  has a past medical history of Anxiety, Dyslipidemia (11/17/2010), Elevated liver function tests (11/17/2010), HTN (hypertension) (11/17/2010), Hyperlipidemia, and Hypertension.  He  has a past surgical history that includes vasectomy.       Objective:   Ht 1.727 m (5' 8\") Comment: pt states  Wt 104 kg (230 lb) Comment: pt states  BMI 34.97 kg/m²  Respirations visually estimated to be 14.     Physical Exam:  Constitutional: Alert, no distress, well-groomed.  Skin: No rashes in visible areas.  ENMT: Lips pink without lesions. Phonation normal.  Neck: No masses, no thyromegaly. Moves freely without pain.  Respiratory: Unlabored respiratory effort, no cough or audible wheeze  Psych: Alert and oriented x3, normal affect and mood.     LABORATORY DATA:  His total cholesterol is 137, triglycerides 226, HDL 51, LDL 40 on 8/4/2021.  GGT level of 73 and bilirubin of 2.0 on labs done through his work on 8/4/2021. PSA 1.27 8/4/2021.    Assessment and Plan:   The following treatment plan was discussed:     1. Dyslipidemia  2. Hypertriglyceridemia  Chronic condition, stable.  Continue current regimen and add fenofibrate.   - fenofibrate (TRICOR) 145 MG Tab; Take 1 Tablet by mouth every day.  Dispense: 90 Tablet; Refill: 3    3. Essential hypertension  Chronic condition, not controlled. Add " amlodipine, recheck in two weeks with blood pressure log.  - amLODIPine (NORVASC) 5 MG Tab; Take 1 Tablet by mouth every day.  Dispense: 30 Tablet; Refill: 3    4. Enlarged prostate  Would like to restart tamsulosin and finesteride.  Recent PSA 1.27 8/4/2021.   - REFERRAL TO UROLOGY    5. Chronic right shoulder pain  Wants to proceed with surgical repair  - REFERRAL TO ORTHOPEDICS    6. Elevated serum GGT level  New condition.  Consider GI referral.   - US-ABDOMEN COMPLETE SURVEY; Future  - HEPATITIS PANEL ACUTE(4 COMPONENTS); Future      Follow-up: Return in about 2 weeks (around 9/13/2021).

## 2021-08-31 NOTE — ASSESSMENT & PLAN NOTE
Chronic condition, stable.  Patient did have recent labs through his work.  His total cholesterol is 137, triglycerides 226, HDL 51, LDL 40 on 8/4/2021.  Patient reports concern over his high triglycerides. He is taking atorvastatin 40 mg daily and tolerates this well without myalgias/arthralgias.  Discussed adding fenofibrate to his regimen and patient would like to try this. Will repeat lipid panel in three months.

## 2021-09-21 ENCOUNTER — PATIENT MESSAGE (OUTPATIENT)
Dept: MEDICAL GROUP | Facility: PHYSICIAN GROUP | Age: 56
End: 2021-09-21

## 2021-09-21 DIAGNOSIS — E78.5 HYPERLIPIDEMIA, UNSPECIFIED HYPERLIPIDEMIA TYPE: ICD-10-CM

## 2021-09-21 DIAGNOSIS — I10 ESSENTIAL HYPERTENSION: ICD-10-CM

## 2021-09-21 RX ORDER — AMLODIPINE BESYLATE 10 MG/1
10 TABLET ORAL DAILY
Qty: 90 TABLET | Refills: 3 | Status: SHIPPED | OUTPATIENT
Start: 2021-09-21 | End: 2021-10-28 | Stop reason: SDUPTHER

## 2021-09-21 RX ORDER — ATORVASTATIN CALCIUM 40 MG/1
40 TABLET, FILM COATED ORAL
Qty: 90 TABLET | Refills: 3 | Status: SHIPPED | OUTPATIENT
Start: 2021-09-21 | End: 2021-10-28 | Stop reason: SDUPTHER

## 2021-09-21 RX ORDER — IBUPROFEN 800 MG/1
800 TABLET ORAL EVERY 8 HOURS PRN
Qty: 30 TABLET | Refills: 1 | Status: SHIPPED | OUTPATIENT
Start: 2021-09-21 | End: 2022-03-21 | Stop reason: SDUPTHER

## 2021-09-21 NOTE — TELEPHONE ENCOUNTER
From: Lawrence Garrett  To: Nurse Practitioner Shweta Kay  Sent: 9/21/2021 9:41 AM PDT  Subject: Lab work request     Derick Rock,    I’m not finding the lab work order and I might be looking right at it (lol).    Can you add / update the work order in my chart on what lab test you want to see?    Thanks Ed

## 2021-09-21 NOTE — TELEPHONE ENCOUNTER
From: Lawrence Garrett  To: Nurse Practitioner Shweta Kay  Sent: 9/21/2021 9:15 AM PDT  Subject: Meds request     Please refill:  atorvastatin 40 MG Tabs  Ibuprofen 800 MG Tabs    Thanks   Ed

## 2021-09-27 ENCOUNTER — HOSPITAL ENCOUNTER (OUTPATIENT)
Dept: RADIOLOGY | Facility: MEDICAL CENTER | Age: 56
End: 2021-09-27
Attending: NURSE PRACTITIONER
Payer: COMMERCIAL

## 2021-09-27 DIAGNOSIS — R74.8 ELEVATED SERUM GGT LEVEL: ICD-10-CM

## 2021-09-27 PROCEDURE — 76700 US EXAM ABDOM COMPLETE: CPT

## 2021-10-19 ENCOUNTER — PATIENT MESSAGE (OUTPATIENT)
Dept: MEDICAL GROUP | Facility: PHYSICIAN GROUP | Age: 56
End: 2021-10-19

## 2021-10-28 DIAGNOSIS — I10 ESSENTIAL HYPERTENSION: ICD-10-CM

## 2021-10-28 DIAGNOSIS — E78.5 HYPERLIPIDEMIA, UNSPECIFIED HYPERLIPIDEMIA TYPE: ICD-10-CM

## 2021-10-28 RX ORDER — ATORVASTATIN CALCIUM 40 MG/1
40 TABLET, FILM COATED ORAL
Qty: 90 TABLET | Refills: 3 | Status: SHIPPED
Start: 2021-10-28 | End: 2022-02-28

## 2021-10-28 RX ORDER — AMLODIPINE BESYLATE 10 MG/1
10 TABLET ORAL DAILY
Qty: 90 TABLET | Refills: 3 | Status: SHIPPED | OUTPATIENT
Start: 2021-10-28 | End: 2022-11-16 | Stop reason: SDUPTHER

## 2022-02-28 ENCOUNTER — PRE-ADMISSION TESTING (OUTPATIENT)
Dept: ADMISSIONS | Facility: MEDICAL CENTER | Age: 57
End: 2022-02-28
Attending: ORTHOPAEDIC SURGERY
Payer: COMMERCIAL

## 2022-02-28 DIAGNOSIS — Z01.812 PRE-OPERATIVE LABORATORY EXAMINATION: ICD-10-CM

## 2022-02-28 RX ORDER — MECLIZINE HCL 12.5 MG/1
TABLET ORAL
COMMUNITY
End: 2022-10-24

## 2022-02-28 RX ORDER — ATORVASTATIN CALCIUM 40 MG/1
40 TABLET, FILM COATED ORAL DAILY
COMMUNITY
Start: 2021-10-28 | End: 2022-11-16 | Stop reason: SDUPTHER

## 2022-02-28 NOTE — OR NURSING
"Preadmit appointment: \" Preparing for your Procedure information\" sheet given to patient with verbal and written instructions. Patient instructed to continue prescribed medications through the day before surgery, instructed to take the following medications the day of surgery per anesthesia protocol:   NORVASC, LIPITOR, TRICOR, ANTIVERT       Verbal and written, and pre-admit video website instructions provided on covid symptoms to watch for given to patient, pt advised to notify MD if any symptoms develop. Verbal instructions given to follow the [place] mask mandate and encouraged to avoid crowds. Also verbally instructed to wear a mask when with person known not to have had the Covid vaccine.    FALL RISK ordered and protocol initiated.  "

## 2022-03-01 ENCOUNTER — PRE-ADMISSION TESTING (OUTPATIENT)
Dept: ADMISSIONS | Facility: MEDICAL CENTER | Age: 57
End: 2022-03-01
Attending: ORTHOPAEDIC SURGERY
Payer: COMMERCIAL

## 2022-03-01 DIAGNOSIS — Z01.812 PRE-OPERATIVE LABORATORY EXAMINATION: ICD-10-CM

## 2022-03-01 LAB
ALBUMIN SERPL BCP-MCNC: 4.5 G/DL (ref 3.2–4.9)
ALBUMIN/GLOB SERPL: 1.7 G/DL
ALP SERPL-CCNC: 91 U/L (ref 30–99)
ALT SERPL-CCNC: 36 U/L (ref 2–50)
ANION GAP SERPL CALC-SCNC: 12 MMOL/L (ref 7–16)
AST SERPL-CCNC: 31 U/L (ref 12–45)
BILIRUB SERPL-MCNC: 0.5 MG/DL (ref 0.1–1.5)
BUN SERPL-MCNC: 19 MG/DL (ref 8–22)
CALCIUM SERPL-MCNC: 9.6 MG/DL (ref 8.4–10.2)
CHLORIDE SERPL-SCNC: 106 MMOL/L (ref 96–112)
CO2 SERPL-SCNC: 23 MMOL/L (ref 20–33)
CREAT SERPL-MCNC: 1.11 MG/DL (ref 0.5–1.4)
ERYTHROCYTE [DISTWIDTH] IN BLOOD BY AUTOMATED COUNT: 41 FL (ref 35.9–50)
GLOBULIN SER CALC-MCNC: 2.7 G/DL (ref 1.9–3.5)
GLUCOSE SERPL-MCNC: 118 MG/DL (ref 65–99)
HCT VFR BLD AUTO: 41.6 % (ref 42–52)
HGB BLD-MCNC: 14.3 G/DL (ref 14–18)
MCH RBC QN AUTO: 29.7 PG (ref 27–33)
MCHC RBC AUTO-ENTMCNC: 34.4 G/DL (ref 33.7–35.3)
MCV RBC AUTO: 86.5 FL (ref 81.4–97.8)
PLATELET # BLD AUTO: 265 K/UL (ref 164–446)
PMV BLD AUTO: 10.4 FL (ref 9–12.9)
POTASSIUM SERPL-SCNC: 3.8 MMOL/L (ref 3.6–5.5)
PROT SERPL-MCNC: 7.2 G/DL (ref 6–8.2)
RBC # BLD AUTO: 4.81 M/UL (ref 4.7–6.1)
SODIUM SERPL-SCNC: 141 MMOL/L (ref 135–145)
WBC # BLD AUTO: 5.2 K/UL (ref 4.8–10.8)

## 2022-03-01 PROCEDURE — U0005 INFEC AGEN DETEC AMPLI PROBE: HCPCS

## 2022-03-01 PROCEDURE — U0003 INFECTIOUS AGENT DETECTION BY NUCLEIC ACID (DNA OR RNA); SEVERE ACUTE RESPIRATORY SYNDROME CORONAVIRUS 2 (SARS-COV-2) (CORONAVIRUS DISEASE [COVID-19]), AMPLIFIED PROBE TECHNIQUE, MAKING USE OF HIGH THROUGHPUT TECHNOLOGIES AS DESCRIBED BY CMS-2020-01-R: HCPCS

## 2022-03-01 PROCEDURE — 36415 COLL VENOUS BLD VENIPUNCTURE: CPT

## 2022-03-01 PROCEDURE — 85027 COMPLETE CBC AUTOMATED: CPT

## 2022-03-01 PROCEDURE — 80053 COMPREHEN METABOLIC PANEL: CPT

## 2022-03-01 PROCEDURE — C9803 HOPD COVID-19 SPEC COLLECT: HCPCS

## 2022-03-02 LAB
SARS-COV-2 RNA RESP QL NAA+PROBE: NOTDETECTED
SPECIMEN SOURCE: NORMAL

## 2022-03-03 NOTE — OR NURSING
LM with ANASTACIO Nava at Dr. Lane's office to return call with status of medical clearance request for procedure tomorrow, 3/4.

## 2022-03-08 ENCOUNTER — ANESTHESIA (OUTPATIENT)
Dept: SURGERY | Facility: MEDICAL CENTER | Age: 57
End: 2022-03-08
Payer: COMMERCIAL

## 2022-03-08 ENCOUNTER — ANESTHESIA EVENT (OUTPATIENT)
Dept: SURGERY | Facility: MEDICAL CENTER | Age: 57
End: 2022-03-08
Payer: COMMERCIAL

## 2022-03-08 ENCOUNTER — HOSPITAL ENCOUNTER (OUTPATIENT)
Facility: MEDICAL CENTER | Age: 57
End: 2022-03-08
Attending: ORTHOPAEDIC SURGERY | Admitting: ORTHOPAEDIC SURGERY
Payer: COMMERCIAL

## 2022-03-08 VITALS
TEMPERATURE: 97.5 F | DIASTOLIC BLOOD PRESSURE: 79 MMHG | WEIGHT: 231 LBS | OXYGEN SATURATION: 90 % | RESPIRATION RATE: 18 BRPM | BODY MASS INDEX: 35.01 KG/M2 | HEIGHT: 68 IN | SYSTOLIC BLOOD PRESSURE: 122 MMHG | HEART RATE: 99 BPM

## 2022-03-08 PROCEDURE — 700111 HCHG RX REV CODE 636 W/ 250 OVERRIDE (IP): Performed by: ORTHOPAEDIC SURGERY

## 2022-03-08 PROCEDURE — 700111 HCHG RX REV CODE 636 W/ 250 OVERRIDE (IP): Performed by: ANESTHESIOLOGY

## 2022-03-08 PROCEDURE — 64415 NJX AA&/STRD BRCH PLXS IMG: CPT | Performed by: ORTHOPAEDIC SURGERY

## 2022-03-08 PROCEDURE — 160002 HCHG RECOVERY MINUTES (STAT): Performed by: ORTHOPAEDIC SURGERY

## 2022-03-08 PROCEDURE — C1713 ANCHOR/SCREW BN/BN,TIS/BN: HCPCS | Performed by: ORTHOPAEDIC SURGERY

## 2022-03-08 PROCEDURE — 160009 HCHG ANES TIME/MIN: Performed by: ORTHOPAEDIC SURGERY

## 2022-03-08 PROCEDURE — 502580 HCHG PACK, KNEE ARTHROSCOPY: Performed by: ORTHOPAEDIC SURGERY

## 2022-03-08 PROCEDURE — 700101 HCHG RX REV CODE 250: Performed by: ANESTHESIOLOGY

## 2022-03-08 PROCEDURE — 700105 HCHG RX REV CODE 258: Performed by: ORTHOPAEDIC SURGERY

## 2022-03-08 PROCEDURE — 160035 HCHG PACU - 1ST 60 MINS PHASE I: Performed by: ORTHOPAEDIC SURGERY

## 2022-03-08 PROCEDURE — 160041 HCHG SURGERY MINUTES - EA ADDL 1 MIN LEVEL 4: Performed by: ORTHOPAEDIC SURGERY

## 2022-03-08 PROCEDURE — 501838 HCHG SUTURE GENERAL: Performed by: ORTHOPAEDIC SURGERY

## 2022-03-08 PROCEDURE — 160036 HCHG PACU - EA ADDL 30 MINS PHASE I: Performed by: ORTHOPAEDIC SURGERY

## 2022-03-08 PROCEDURE — 160048 HCHG OR STATISTICAL LEVEL 1-5: Performed by: ORTHOPAEDIC SURGERY

## 2022-03-08 PROCEDURE — 160046 HCHG PACU - 1ST 60 MINS PHASE II: Performed by: ORTHOPAEDIC SURGERY

## 2022-03-08 PROCEDURE — 160025 RECOVERY II MINUTES (STATS): Performed by: ORTHOPAEDIC SURGERY

## 2022-03-08 PROCEDURE — 160029 HCHG SURGERY MINUTES - 1ST 30 MINS LEVEL 4: Performed by: ORTHOPAEDIC SURGERY

## 2022-03-08 DEVICE — ANCHOR ICONIX 1-#2 FORCEFIBER 1.4MM (5EA/BX): Type: IMPLANTABLE DEVICE | Status: FUNCTIONAL

## 2022-03-08 RX ORDER — DEXAMETHASONE SODIUM PHOSPHATE 4 MG/ML
INJECTION, SOLUTION INTRA-ARTICULAR; INTRALESIONAL; INTRAMUSCULAR; INTRAVENOUS; SOFT TISSUE PRN
Status: DISCONTINUED | OUTPATIENT
Start: 2022-03-08 | End: 2022-03-08 | Stop reason: SURG

## 2022-03-08 RX ORDER — SODIUM CHLORIDE, SODIUM LACTATE, POTASSIUM CHLORIDE, CALCIUM CHLORIDE 600; 310; 30; 20 MG/100ML; MG/100ML; MG/100ML; MG/100ML
INJECTION, SOLUTION INTRAVENOUS CONTINUOUS
Status: DISCONTINUED | OUTPATIENT
Start: 2022-03-08 | End: 2022-03-08 | Stop reason: HOSPADM

## 2022-03-08 RX ORDER — CEFAZOLIN SODIUM 1 G/3ML
INJECTION, POWDER, FOR SOLUTION INTRAMUSCULAR; INTRAVENOUS PRN
Status: DISCONTINUED | OUTPATIENT
Start: 2022-03-08 | End: 2022-03-08 | Stop reason: SURG

## 2022-03-08 RX ORDER — LIDOCAINE HYDROCHLORIDE 40 MG/ML
SOLUTION TOPICAL PRN
Status: DISCONTINUED | OUTPATIENT
Start: 2022-03-08 | End: 2022-03-08 | Stop reason: SURG

## 2022-03-08 RX ORDER — HYDROMORPHONE HYDROCHLORIDE 1 MG/ML
0.4 INJECTION, SOLUTION INTRAMUSCULAR; INTRAVENOUS; SUBCUTANEOUS
Status: DISCONTINUED | OUTPATIENT
Start: 2022-03-08 | End: 2022-03-08 | Stop reason: HOSPADM

## 2022-03-08 RX ORDER — HYDROMORPHONE HYDROCHLORIDE 1 MG/ML
0.2 INJECTION, SOLUTION INTRAMUSCULAR; INTRAVENOUS; SUBCUTANEOUS
Status: DISCONTINUED | OUTPATIENT
Start: 2022-03-08 | End: 2022-03-08 | Stop reason: HOSPADM

## 2022-03-08 RX ORDER — EPINEPHRINE 1 MG/ML(1)
AMPUL (ML) INJECTION
Status: DISCONTINUED | OUTPATIENT
Start: 2022-03-08 | End: 2022-03-08 | Stop reason: HOSPADM

## 2022-03-08 RX ORDER — SODIUM CHLORIDE, SODIUM LACTATE, POTASSIUM CHLORIDE, CALCIUM CHLORIDE 600; 310; 30; 20 MG/100ML; MG/100ML; MG/100ML; MG/100ML
INJECTION, SOLUTION INTRAVENOUS CONTINUOUS
Status: ACTIVE | OUTPATIENT
Start: 2022-03-08 | End: 2022-03-08

## 2022-03-08 RX ORDER — ONDANSETRON 2 MG/ML
INJECTION INTRAMUSCULAR; INTRAVENOUS PRN
Status: DISCONTINUED | OUTPATIENT
Start: 2022-03-08 | End: 2022-03-08 | Stop reason: SURG

## 2022-03-08 RX ORDER — MEPERIDINE HYDROCHLORIDE 25 MG/ML
12.5 INJECTION INTRAMUSCULAR; INTRAVENOUS; SUBCUTANEOUS
Status: DISCONTINUED | OUTPATIENT
Start: 2022-03-08 | End: 2022-03-08 | Stop reason: HOSPADM

## 2022-03-08 RX ORDER — HYDROMORPHONE HYDROCHLORIDE 1 MG/ML
0.6 INJECTION, SOLUTION INTRAMUSCULAR; INTRAVENOUS; SUBCUTANEOUS
Status: DISCONTINUED | OUTPATIENT
Start: 2022-03-08 | End: 2022-03-08 | Stop reason: HOSPADM

## 2022-03-08 RX ORDER — OXYCODONE HCL 5 MG/5 ML
10 SOLUTION, ORAL ORAL
Status: DISCONTINUED | OUTPATIENT
Start: 2022-03-08 | End: 2022-03-08 | Stop reason: HOSPADM

## 2022-03-08 RX ORDER — OXYCODONE HCL 5 MG/5 ML
5 SOLUTION, ORAL ORAL
Status: DISCONTINUED | OUTPATIENT
Start: 2022-03-08 | End: 2022-03-08 | Stop reason: HOSPADM

## 2022-03-08 RX ORDER — ONDANSETRON 2 MG/ML
4 INJECTION INTRAMUSCULAR; INTRAVENOUS
Status: DISCONTINUED | OUTPATIENT
Start: 2022-03-08 | End: 2022-03-08 | Stop reason: HOSPADM

## 2022-03-08 RX ORDER — HALOPERIDOL 5 MG/ML
1 INJECTION INTRAMUSCULAR
Status: DISCONTINUED | OUTPATIENT
Start: 2022-03-08 | End: 2022-03-08 | Stop reason: HOSPADM

## 2022-03-08 RX ORDER — ROPIVACAINE HYDROCHLORIDE 5 MG/ML
INJECTION, SOLUTION EPIDURAL; INFILTRATION; PERINEURAL
Status: COMPLETED | OUTPATIENT
Start: 2022-03-08 | End: 2022-03-08

## 2022-03-08 RX ORDER — DIPHENHYDRAMINE HYDROCHLORIDE 50 MG/ML
12.5 INJECTION INTRAMUSCULAR; INTRAVENOUS
Status: DISCONTINUED | OUTPATIENT
Start: 2022-03-08 | End: 2022-03-08 | Stop reason: HOSPADM

## 2022-03-08 RX ADMIN — MIDAZOLAM HYDROCHLORIDE 2 MG: 1 INJECTION, SOLUTION INTRAMUSCULAR; INTRAVENOUS at 13:36

## 2022-03-08 RX ADMIN — SODIUM CHLORIDE, POTASSIUM CHLORIDE, SODIUM LACTATE AND CALCIUM CHLORIDE: 600; 310; 30; 20 INJECTION, SOLUTION INTRAVENOUS at 13:36

## 2022-03-08 RX ADMIN — EPHEDRINE SULFATE 10 MG: 50 INJECTION INTRAMUSCULAR; INTRAVENOUS; SUBCUTANEOUS at 14:10

## 2022-03-08 RX ADMIN — DEXAMETHASONE SODIUM PHOSPHATE 4 MG: 4 INJECTION, SOLUTION INTRAMUSCULAR; INTRAVENOUS at 14:51

## 2022-03-08 RX ADMIN — PROPOFOL 200 MG: 10 INJECTION, EMULSION INTRAVENOUS at 13:38

## 2022-03-08 RX ADMIN — ROCURONIUM BROMIDE 50 MG: 10 INJECTION INTRAVENOUS at 13:38

## 2022-03-08 RX ADMIN — ROPIVACAINE HYDROCHLORIDE 30 ML: 5 INJECTION, SOLUTION EPIDURAL; INFILTRATION; PERINEURAL at 13:28

## 2022-03-08 RX ADMIN — ONDANSETRON 4 MG: 2 INJECTION INTRAMUSCULAR; INTRAVENOUS at 14:51

## 2022-03-08 RX ADMIN — CEFAZOLIN 2 G: 330 INJECTION, POWDER, FOR SOLUTION INTRAMUSCULAR; INTRAVENOUS at 13:45

## 2022-03-08 RX ADMIN — LIDOCAINE HYDROCHLORIDE 4 ML: 40 SOLUTION TOPICAL at 13:38

## 2022-03-08 RX ADMIN — FENTANYL CITRATE 100 MCG: 50 INJECTION, SOLUTION INTRAMUSCULAR; INTRAVENOUS at 13:36

## 2022-03-08 ASSESSMENT — PAIN SCALES - GENERAL: PAIN_LEVEL: 2

## 2022-03-08 ASSESSMENT — FIBROSIS 4 INDEX: FIB4 SCORE: 1.13

## 2022-03-08 NOTE — ANESTHESIA TIME REPORT
Anesthesia Start and Stop Event Times     Date Time Event    3/8/2022 1313 Ready for Procedure     1336 Anesthesia Start     1503 Anesthesia Stop        Responsible Staff  03/08/22    Name Role Begin End    Mejia Aguirre M.D. Anesth 1336 1503        Preop Diagnosis (Free Text):  Pre-op Diagnosis     IMPINGEMENT SYNDROME OF RIGHT SHOULDER        Preop Diagnosis (Codes):    Premium Reason  A. 3PM - 7AM    Comments: Premium aaliyah

## 2022-03-08 NOTE — OP REPORT
DATE OF SERVICE:  03/08/2022     PREOPERATIVE DIAGNOSES:  Right shoulder impingement syndrome, SLAP lesion with   proximal biceps tendinopathy, partial thickness rotator cuff tear.     POSTOPERATIVE DIAGNOSES:  Right shoulder impingement syndrome, SLAP lesion   with proximal biceps tendinopathy, mild glenohumeral joint chondromalacia.     PROCEDURES:  Right shoulder arthroscopy, subacromial decompression, labral   debridement, arthroscopic suprapectoral proximal biceps tenodesis,   chondroplasty.     SURGEON:  Dodie Huang MD     ASSISTANT:  Modesto Mayes CFA     ANESTHESIOLOGIST:  Mejia Aguirre MD     TYPE OF ANESTHESIA:  General, with preoperative interscalene nerve block.     INTRAVENOUS FLUID:  One liter crystalloid.     ESTIMATED BLOOD LOSS:  Minimal.     DRAINS:  None.     SPECIMENS:  None.     COMPLICATIONS:  None.     IMPLANTS:  Jv Iconix anchors x3.     REASON FOR PROCEDURE:  The patient is a 56-year-old male with a longstanding   history of right shoulder pain.  He failed non-operative measures.  We   reviewed MRI findings.  We decided to proceed with arthroscopy.     DESCRIPTION OF PROCEDURE:  The patient was given a right interscalene nerve   block by the anesthesiologist before surgery.  Once back in the operating   room, a breathing tube was placed.  He was given 2 grams of IV Ancef.  He was   then placed in the lateral decubitus position.  Care was taken to pad his   axilla as well as all bony prominences.  The right upper extremity was then   prepped with ChloraPrep and draped in standard sterile fashion.  It was then   placed in the Arthrex traction device.  Bony landmarks were drawn and a   standard posterior portal was established.  The arthroscope was then inserted   into the glenohumeral joint.  An anterosuperior cannula was placed in the   rotator interval, just beneath the biceps tendon.  A probe was inserted.    There was a large SLAP lesion that was noted with extension  posteriorly.  This   created some subluxation, mild instability to the biceps.  The edge of the   labrum was debrided.  The decision was made to perform a biceps tenodesis at   that point.  The subscapularis tendon was intact as was the undersurface of   the rotator cuff.  The humeral head was normal.  There was, however, an area   of grade III chondral change noted at the posteroinferior quadrant of the   glenoid.  A smoothing chondroplasty was performed.  Next, the arthroscope was   placed into the subacromial space.  A lateral portal was established.  There   was a copious amount of thickened, inflamed bursal tissue.  The 5.5 mm   resector was used to remove the anterior curve as well as lateral edge.    Portals were switched.  Using a standard cutting block technique from   posterior to anterior, a subacromial decompression was carried out.  This   created a nice, smooth surface to the undersurface of the acromion.  Attention   was then turned to the rotator cuff below.  There was an erythematous   impingement lesion, but no significant partial tear.  A near complete   subacromial bursectomy was performed.  The lateral aspect of the acromioplasty   was completed.  All instruments were then removed.  Portals were closed with   3-0 nylon suture.  A sterile dressing was applied.  All drapes were removed   and the arm was carefully taken out of traction and placed into a shoulder   abduction sling.  The patient was placed supine on a stretcher and taken to   recovery room, in stable condition.  Notably, once the biceps tendon had been   released, a tenotomy was performed proximal to the tenodesis site with the 4   cm segment removed.        ______________________________  MD EDUARDO MONTIEL/SULLY    DD:  03/08/2022 14:57  DT:  03/08/2022 15:55    Job#:  [id number]

## 2022-03-08 NOTE — OR NURSING
1500 To PACU from OR via loy s/p right shoulder arthroscopy. Pt drowsy, respirations spontaneous and non-labored. Surgical dressing to right shoulder. Immobilizer to RUE. Fingers to the affected extremity are pink and warm, brisk cap refill observed, radial pulse palpable. Pt has no complaints.     1515 No changes.    1530 No changes. Pt encouraged to DB&C. Pt instructed in use of inspirometer.     1545 No changes. Pt's wife updated    1600 No changes.     1615 Pt maintaining RA oxygen saturation of at least 90%.     1622 Report to discharge CAROLINA Garza.

## 2022-03-08 NOTE — ANESTHESIA POSTPROCEDURE EVALUATION
Patient: Lawrence Garrett    Procedure Summary     Date: 03/08/22 Room / Location:  OR  / SURGERY [facility]    Anesthesia Start: 1336 Anesthesia Stop: 1503    Procedures:       DECOMPRESSION, SHOULDER, ARTHROSCOPIC - SUBACROMIAL (Right Shoulder)      DEBRIDEMENT,SHOULDER,LIMITED,ARTHROSCOPIC - LABRAL (Right Shoulder)      ARTHROSCOPY, SHOULDER, WITH BICEPS TENODESIS - PROXIMAL (Right Shoulder)      CHONDROPLASTY (Right Shoulder) Diagnosis: (IMPINGEMENT SYNDROME OF RIGHT SHOULDER)    Surgeons: Dodie Huang M.D. Responsible Provider: Mejia Aguirre M.D.    Anesthesia Type: general, peripheral nerve block ASA Status: 3          Final Anesthesia Type: general, peripheral nerve block  Last vitals  BP   Blood Pressure: 140/88    Temp   36.4 °C (97.5 °F)    Pulse   76   Resp        SpO2   96 %      Anesthesia Post Evaluation    Patient location during evaluation: PACU  Patient participation: complete - patient participated  Level of consciousness: awake and alert  Pain score: 2    Airway patency: patent  Anesthetic complications: no  Cardiovascular status: hemodynamically stable  Respiratory status: acceptable  Hydration status: euvolemic    PONV: none          No complications documented.

## 2022-03-08 NOTE — ANESTHESIA PREPROCEDURE EVALUATION
Case: [id number] Date/Time: 03/08/22 1245    Procedures:       DECOMPRESSION, SHOULDER, ARTHROSCOPIC - SUBACROMIAL (Right Shoulder)      DEBRIDEMENT,SHOULDER,LIMITED,ARTHROSCOPIC - LABRAL (Right Shoulder)      ARTHROSCOPY, SHOULDER, WITH BICEPS TENODESIS - PROXIMAL (Right Shoulder)      ARTHROSCOPY, SHOULDER, WITH ROTATOR CUFF REPAIR - AND REPAIRS AS INDICATED (Right Shoulder)      CHONDROPLASTY (Right Shoulder)    Anesthesia type: General    Pre-op diagnosis: IMPINGEMENT SYNDROME OF RIGHT SHOULDER    Location:  OR  / SURGERY [facility]    Surgeons: Dodie Huang M.D.          Relevant Problems   ANESTHESIA   (positive) Sea sickness      CARDIAC   (positive) HTN (hypertension)       Physical Exam    Airway   Mallampati: II  TM distance: >3 FB  Neck ROM: full       Cardiovascular - normal exam  Rhythm: regular  Rate: normal  (-) murmur     Dental - normal exam           Pulmonary - normal exam  Breath sounds clear to auscultation     Abdominal    Neurological - normal exam                 Anesthesia Plan    ASA 3   ASA physical status 3 criteria: other (comment)    Plan - general and peripheral nerve block     Peripheral nerve block will be post-op pain control  Airway plan will be ETT          Induction: intravenous    Postoperative Plan: Postoperative administration of opioids is intended.    Pertinent diagnostic labs and testing reviewed    Informed Consent:    Anesthetic plan and risks discussed with patient.    Use of blood products discussed with: patient whom consented to blood products.

## 2022-03-08 NOTE — ANESTHESIA PROCEDURE NOTES
Peripheral Block    Date/Time: 3/8/2022 1:28 PM  Performed by: Mejia Aguirre M.D.  Authorized by: Mejia Aguirre M.D.     Start Time:  3/8/2022 1:28 PM  End Time:  3/8/2022 1:32 PM  Reason for Block: at surgeon's request and post-op pain management ONLY    patient identified, IV checked, site marked, risks and benefits discussed, surgical consent, monitors and equipment checked, pre-op evaluation and timeout performed    Patient Position:  Supine  Prep: ChloraPrep    Monitoring:  Heart rate, continuous pulse ox and cardiac monitor  Block Region:  Upper Extremity  Upper Extremity - Block Type:  BRACHIAL PLEXUS block, Interscalene approach    Laterality:  Right  Procedures: ultrasound guided  Image captured, interpreted and electronically stored.  Local Infiltration:  Lidocaine  Strength:  1 %  Dose:  3 ml  Block Type:  Single-shot  Needle Length:  50mm  Needle Gauge:  22 G  Needle Localization:  Ultrasound guidance  Injection Assessment:  Negative aspiration for heme, no paresthesia on injection, incremental injection and local visualized surrounding nerve on ultrasound  Evidence of intravascular injection: No     Ultrasound images saved

## 2022-03-08 NOTE — DISCHARGE INSTRUCTIONS
ACTIVITY: Rest and take it easy for the first 24 hours.  A responsible adult is recommended to remain with you during that time.  It is normal to feel sleepy.  We encourage you to not do anything that requires balance, judgment or coordination.    MILD FLU-LIKE SYMPTOMS ARE NORMAL. YOU MAY EXPERIENCE GENERALIZED MUSCLE ACHES, THROAT IRRITATION, HEADACHE AND/OR SOME NAUSEA.    FOR 24 HOURS DO NOT:  Drive, operate machinery or run household appliances.  Drink beer or alcoholic beverages.   Make important decisions or sign legal documents.    SPECIAL INSTRUCTIONS: Post-Operative Instructions - Shoulder Arthroscopy     Dressing and wound care: Keep your shoulder dressing clean and dry after surgery.  Be aware that some leaking of blood or fluid from your dressing can occur and is normal. You may remove your dressing 3 days after the operation.  Notice that you have a single incision and/or several small incisions that have been closed with sutures. Cover each of these incisions with a light dressing or band-aids.  This keeps the surgical incisions clean, as well as preventing your clothes from spotting with blood or fluid.  Change band-aids or light dressing daily.     Shower / bathing: Keep the shoulder dry for 3 days after your surgery.  Then, you may shower. You may let soap and water run over skin incisions, but do not immerse your shoulder in water.  No swimming pools, hot tubs, or baths are recommended until at least 6 weeks after surgery.     Ice: Apply an ice pack to your shoulder (15 minutes on the shoulder, 15 minutes off the shoulder), as you feel necessary to help with the pain and swelling.   If you have a cold therapy, use liberally as directed.     Sling / Shoulder Immobilizer: The sling should be on except when bathing or doing your exercises.  You may also carefully remove the sling when awake and seated.  Be sure to support your forearm with a pillow so your shoulder remains in a relaxed and  comfortable position.  Please wear the sling while sleeping.     Activity: It is important to move your shoulder, as well as your elbow, wrist, and hand several times daily, starting the day after surgery.  You may do pendulum exercises with your operative arm starting the day after surgery.  Pendulum (dangling [place]) exercises are encouraged 3-5 times daily.  The sling will need to be removed for pendulum exercises.  You may loosen the sling to use your operative arm for light simple activities such as eating, brushing teeth, or writing/typing.  Nothing heavier than a cup of coffee in your hand on the operative side until otherwise recommended.     Pain medication: Take your pain medicine, as needed and prescribed.  Do not drive or operate machinery while taking narcotic pain medication.   You may start or resume anti-inflammatory medication (i.e. ibuprofen, naproxen) anytime after surgery, which should be taken with food to avoid stomach irritation.     Driving: You may drive as soon as you are off narcotic pain medication and feel comfortable behind the wheel.  Loosen or remove your sling, supporting your forearm on a pillow when you drive.  It is important for both hands to have access to the steering wheel for safety.     Aspirin: Take one low dose 81 mg aspirin starting the morning after surgery twice daily - one in the morning and one in the evening - for two weeks following surgery.     Problems: If you are having problems with your shoulder (unexpected pain, excessive bleeding or discharge from your incisions, fevers/chills) do not hesitate to call the office or visit the nearest emergency room for evaluation.     Follow-up: Make sure that you have an appointment 7-14 days following surgery.  Your stitches will be removed, and your procedure/rehabilitation will be discussed at that time.   Physical therapy may be prescribed at that time.     Dodie Huang MD [facility] [phone]    DIET:  To avoid nausea, slowly advance diet as tolerated, avoiding spicy or greasy foods for the first day. Add more substantial food to your diet according to your physician's instructions. INCREASE FLUIDS AND FIBER TO AVOID CONSTIPATION.    FOLLOW-UP APPOINTMENT:  A follow-up appointment should be arranged with your doctor; call to schedule.    You should CALL YOUR PHYSICIAN if you develop:  Fever greater than 101 degrees F.  Pain not relieved by medication, or persistent nausea or vomiting.  Excessive bleeding (blood soaking through dressing) or unexpected drainage from the wound.  Extreme redness or swelling around the incision site, drainage of pus or foul smelling drainage.  Inability to urinate or empty your bladder within 8 hours.  Problems with breathing or chest pain.    You should call 911 if you develop problems with breathing or chest pain.  If you are unable to contact your doctor or surgical center, you should go to the nearest emergency room or urgent care center.      If any questions arise, call your doctor.  If your doctor is not available, please feel free to call the Surgical Center at [phone]. The Center is open Monday through Friday from 7AM to 7PM. You can also call the [place] HOTLINE open 24 hours/day, 7 days/week and speak to a nurse at [phone], or toll free at [phone].    A registered nurse may call you a few days after your surgery to see how you are doing after your procedure.    MEDICATIONS: Resume taking daily medication.  Take prescribed pain medication with food.  If no medication is prescribed, you may take non-aspirin pain medication if needed. PAIN MEDICATION CAN BE VERY CONSTIPATING. Take a stool softener or laxative such as senokot, pericolace, or milk of magnesia if needed.    No oral pain medication given in the recovery room    If your physician has prescribed pain medication that includes Acetaminophen (Tylenol), do not take additional Acetaminophen (Tylenol)  "while taking the prescribed medication.    Peripheral Nerve Block Discharge Instructions from Same Day Surgery and Inpatient :    What to Expect - Upper Extremity  · You may experience numbness and weakness in shoulder, arm and hand  on the same side as your surgery  · This is normal. For some people, this may be an unpleasant sensation. Be very careful with your numb limb  · Ask for help when you need it  Shoulder Surgery Side Effects  · In addition to numbness and weakness you may experience other symptoms  · Other nerves that are close to those nerves injected can also be affected by local anesthesia  · You may experience a hoarseness in your voice  · Your breathing may feel different  · You may also notice drooping of your eyelid, pupil constriction, and decreased sweating, on the side of your surgery  · All of these side effects are normal and will resolve when the local anesthetic wears off   Prevent Injury  · Protect the limb like a baby  · Beware of exposing your limb to extreme heat or cold or trauma  · The limb may be injured without you noticing because it is numb  · Keep the limb elevated whenever possible  · Do not sleep on the limb  · Change the position of the limb regularly  · Avoid putting pressure on your surgical limb  Pain Control  · The initial block on the day of surgery will make your extremity feel \"numb\"  · Any consecutive injection including prior to discharge from the hospital will make your extremity feel \"numb\"  · You may feel an aching or burning when the local anesthesia starts to wear off  · Take pain pills as prescribed by your surgeon  · Call your surgeon or anesthesiologist if you do not have adequate pain control      Depression / Suicide Risk    As you are discharged from this [facility] facility, it is important to learn how to keep safe from harming yourself.    Recognize the warning signs:  · Abrupt changes in personality, positive or negative- including increase in energy "   · Giving away possessions  · Change in eating patterns- significant weight changes-  positive or negative  · Change in sleeping patterns- unable to sleep or sleeping all the time   · Unwillingness or inability to communicate  · Depression  · Unusual sadness, discouragement and loneliness  · Talk of wanting to die  · Neglect of personal appearance   · Rebelliousness- reckless behavior  · Withdrawal from people/activities they love  · Confusion- inability to concentrate     If you or a loved one observes any of these behaviors or has concerns about self-harm, here's what you can do:  · Talk about it- your feelings and reasons for harming yourself  · Remove any means that you might use to hurt yourself (examples: pills, rope, extension cords, firearm)  · Get professional help from the community (Mental Health, Substance Abuse, psychological counseling)  · Do not be alone:Call your Safe Contact- someone whom you trust who will be there for you.  · Call your local CRISIS HOTLINE 635-2481 or 645-239-4660  · Call your local Children's Mobile Crisis Response Team [place] (142) 949-7713 or www.[employer]  · Call the toll free National Suicide Prevention Hotlines   · National Suicide Prevention Lifeline 477-621-PZQI (4909)  · National Hope Line Network 800-SUICIDE (653-8365)

## 2022-03-08 NOTE — ANESTHESIA PROCEDURE NOTES
Airway    Date/Time: 3/8/2022 1:39 PM  Performed by: Mejia Aguirre M.D.  Authorized by: Mejia Aguirre M.D.     Location:  OR  Urgency:  Elective  Indications for Airway Management:  Anesthesia      Spontaneous Ventilation: absent    Sedation Level:  Deep  Preoxygenated: Yes    Patient Position:  Sniffing  Final Airway Type:  Endotracheal airway  Final Endotracheal Airway:  ETT  Cuffed: Yes    Technique Used for Successful ETT Placement:  Direct laryngoscopy    Insertion Site:  Oral  Blade Type:  Evelio  Laryngoscope Blade/Videolaryngoscope Blade Size:  3  ETT Size (mm):  7.0  Measured from:  Teeth  ETT to Teeth (cm):  22  Placement Verified by: auscultation and capnometry    Cormack-Lehane Classification:  Grade I - full view of glottis  Number of Attempts at Approach:  1

## 2022-03-08 NOTE — OR SURGEON
Immediate Post OP Note    PreOp Diagnosis: RIGHT shoulder impingement syndrome, SLAP lesion with proximal biceps tendinopathy, partial thickness rotator cuff tear      PostOp Diagnosis: SAME       Procedure(s):  RIGHT  DECOMPRESSION, SHOULDER, ARTHROSCOPIC - SUBACROMIAL - Wound Class: Clean  DEBRIDEMENT,SHOULDER,LIMITED,ARTHROSCOPIC - LABRAL - Wound Class: Clean  ARTHROSCOPY, SHOULDER, WITH BICEPS TENODESIS - PROXIMAL - Wound Class: Clean  CHONDROPLASTY - Wound Class: Clean    Surgeon(s):  Dodie Huang M.D.    Anesthesiologist/Type of Anesthesia:  Anesthesiologist: Mejia Aguirre M.D./General    Surgical Staff:  Circulator: Princess Allison R.N.  Scrub Person: Eboni Deras  First Assist: Modesto Mayes C.NThereseATherese    Specimens removed if any:  * No specimens in log *    Estimated Blood Loss: min    Findings: as above    Complications: none    [place]        3/8/2022 2:50 PM Dodie Huang M.D.

## 2022-03-09 NOTE — OR NURSING
1625: To stage ll. Up to chair and dressed w/ CNA assist. No pain or nausea.    1645: Home care instructions reviewed w/ pt and wife. Questions, concerns addressed. Meets criteria for discharge.

## 2022-03-21 DIAGNOSIS — I10 PRIMARY HYPERTENSION: ICD-10-CM

## 2022-03-21 RX ORDER — IBUPROFEN 800 MG/1
800 TABLET ORAL EVERY 8 HOURS PRN
Qty: 30 TABLET | Refills: 1 | Status: SHIPPED | OUTPATIENT
Start: 2022-03-21

## 2022-03-21 RX ORDER — OLMESARTAN MEDOXOMIL AND HYDROCHLOROTHIAZIDE 40/25 40; 25 MG/1; MG/1
1 TABLET ORAL DAILY
Qty: 30 TABLET | Refills: 3 | Status: SHIPPED | OUTPATIENT
Start: 2022-03-21 | End: 2022-09-06

## 2022-05-24 ENCOUNTER — OFFICE VISIT (OUTPATIENT)
Dept: URGENT CARE | Facility: PHYSICIAN GROUP | Age: 57
End: 2022-05-24
Payer: COMMERCIAL

## 2022-05-24 ENCOUNTER — HOSPITAL ENCOUNTER (OUTPATIENT)
Dept: RADIOLOGY | Facility: MEDICAL CENTER | Age: 57
End: 2022-05-24
Attending: PHYSICIAN ASSISTANT
Payer: COMMERCIAL

## 2022-05-24 VITALS
SYSTOLIC BLOOD PRESSURE: 130 MMHG | RESPIRATION RATE: 16 BRPM | TEMPERATURE: 97.7 F | DIASTOLIC BLOOD PRESSURE: 80 MMHG | BODY MASS INDEX: 36.07 KG/M2 | OXYGEN SATURATION: 96 % | HEART RATE: 74 BPM | WEIGHT: 238 LBS | HEIGHT: 68 IN

## 2022-05-24 DIAGNOSIS — M79.641 RIGHT HAND PAIN: ICD-10-CM

## 2022-05-24 PROCEDURE — 73130 X-RAY EXAM OF HAND: CPT | Mod: RT

## 2022-05-24 PROCEDURE — 99214 OFFICE O/P EST MOD 30 MIN: CPT | Performed by: PHYSICIAN ASSISTANT

## 2022-05-24 ASSESSMENT — ENCOUNTER SYMPTOMS: MYALGIAS: 1

## 2022-05-24 ASSESSMENT — FIBROSIS 4 INDEX: FIB4 SCORE: 1.15

## 2022-05-24 NOTE — PROGRESS NOTES
Subjective:   Lawrence Garrett is a 57 y.o. male who presents for Hand Injury ( Happened Last night feels hand is broken)        Patient presents with sudden onset of dorsal hand pain that began last night.  States that he was lifting up a lawn chair while he was camping, the chair slipped and his hand snapped back.  He endorses swelling-this has improved since this morning..  He endorses pain with movement.  No numbness or tingling.  States that he has some redness in the area, but this was due to sun exposure and proceeded injury.  He denies nausea, vomiting, fevers, chills.  He is taking ibuprofen 800 mg with moderate symptomatic improvement.    Review of Systems   Musculoskeletal: Positive for myalgias. Negative for joint pain.       PMH:  has a past medical history of Anxiety, Dyslipidemia (11/17/2010), Elevated liver function tests (11/17/2010), Hiatus hernia syndrome (12/21 or 01/22), High cholesterol, HTN (hypertension) (11/17/2010), Hyperlipidemia, Hypertension, Infectious disease, Pacemaker, and Psychiatric problem.  MEDS:   Current Outpatient Medications:   •  olmesartan-hydrochlorothiazide (BENICAR HCT) 40-25 MG per tablet, Take 1 Tablet by mouth every day., Disp: 30 Tablet, Rfl: 3  •  ibuprofen (MOTRIN) 800 MG Tab, Take 1 Tablet by mouth every 8 hours as needed. AS NEEDED FOR MILD PAIN., Disp: 30 Tablet, Rfl: 1  •  atorvastatin (LIPITOR) 40 MG Tab, Take 40 mg by mouth every day., Disp: , Rfl:   •  amLODIPine (NORVASC) 10 MG Tab, Take 1 Tablet by mouth every day., Disp: 90 Tablet, Rfl: 3  •  fenofibrate (TRICOR) 145 MG Tab, Take 1 Tablet by mouth every day., Disp: 90 Tablet, Rfl: 3  •  citalopram (CELEXA) 20 MG Tab, Take 1 tablet by mouth every day., Disp: 90 tablet, Rfl: 3  •  aspirin (ASA) 325 MG TABS, Take 325 mg by mouth every day., Disp: , Rfl:   •  docosahexanoic acid (OMEGA 3 FA) 1000 MG Cap, Take 1,000 mg by mouth 2 Times a Day., Disp: , Rfl:   •  vitamin D (CHOLECALCIFEROL) 1000 UNIT TABS,  "Take 5,000 Units by mouth every day., Disp: , Rfl:   •  meclizine (ANTIVERT) 12.5 MG Tab, meclizine 12.5 mg tablet  Take 2 tablets 3 times a day by oral route. (Patient not taking: Reported on 5/24/2022), Disp: , Rfl:   ALLERGIES: No Known Allergies  SURGHX:   Past Surgical History:   Procedure Laterality Date   • [place] SHLDR ARTHROSCOP,PART ACROMIOPLAS Right 3/8/2022    Procedure: DECOMPRESSION, SHOULDER, ARTHROSCOPIC - SUBACROMIAL;  Surgeon: Dodie Huang M.D.;  Location: SURGERY [facility];  Service: Orthopedics   • [place] SHLDR ARTHROSCOP PART DEBRIDE 1-2 Right 3/8/2022    Procedure: DEBRIDEMENT,SHOULDER,LIMITED,ARTHROSCOPIC - LABRAL;  Surgeon: Dodie Huang M.D.;  Location: [facility];  Service: Orthopedics   • PB ARTHROSCOPY SHOULDER SURGICAL BICEPS TENODES* Right 3/8/2022    Procedure: ARTHROSCOPY, SHOULDER, WITH BICEPS TENODESIS - PROXIMAL;  Surgeon: Dodie Huang M.D.;  Location: [facility];  Service: Orthopedics   • CHONDROPLASTY Right 3/8/2022    Procedure: CHONDROPLASTY;  Surgeon: Dodie Huang M.D.;  Location: [facility];  Service: Orthopedics   • OTHER ORTHOPEDIC SURGERY Left 05/2020    RCR   • VASECTOMY       SOCHX:  reports that he has never smoked. He has never used smokeless tobacco. He reports current alcohol use of about 1.8 oz of alcohol per week. He reports that he does not use drugs.  FH: Family history was reviewed, no pertinent findings to report   Objective:   /80 (BP Location: Right arm, Patient Position: Sitting, BP Cuff Size: Adult)   Pulse 74   Temp 36.5 °C (97.7 °F) (Temporal)   Resp 16   Ht 1.727 m (5' 8\")   Wt 108 kg (238 lb)   SpO2 96%   BMI 36.19 kg/m²   Physical Exam  Vitals reviewed.   Constitutional:       General: He is not in acute distress.     Appearance: Normal appearance. He is well-developed. He is not toxic-appearing.   HENT:      Head: Normocephalic and atraumatic.      Right Ear: External ear normal.      Left " Ear: External ear normal.      Nose: Nose normal.   Cardiovascular:      Rate and Rhythm: Normal rate and regular rhythm.   Pulmonary:      Effort: Pulmonary effort is normal. No respiratory distress.      Breath sounds: No stridor.   Musculoskeletal:      Comments: Right hand/wrist: Moderate focal edema over the dorsum of the right hand with central ecchymosis.  Skin is intact -no abrasion, lacerations.  There is mild erythema over the dorsum of the right hand and wrist as well.  No significant warmth as compared to left side.  Patient diffusely tender to palpation over dorsum of the right hand in vicinity of 1 through 4 metacarpals.  No palpable step-offs or deformities.  Range of motion within normal limits, but this does cause patient pain.  Motor function of radial, median, ulnar nerves intact bilaterally.  Sensation grossly intact.  Cap refill is brisk and radial pulse 2+.   Skin:     General: Skin is dry.   Neurological:      Comments: Alert and oriented.    Psychiatric:         Speech: Speech normal.         Behavior: Behavior normal.               FINDINGS:  The alignment is normal.  The bone mineralization is within normal limits.  There is no acute fracture or dislocation.     There is no radio-opaque foreign body.     There is no significant degenerative change.     IMPRESSION:     Unremarkable wrist/hand series.  Assessment/Plan:   1. Right hand pain  - DX-HAND 3+ RIGHT; Future    Discussed possibility of soft tissue infection.  However I do not see a break in the skin patient is adamant that redness is from some mild sunburn and proceeded injury/pain.  He declines prescription for antibiotic but does agree to monitor area closely and see PCP or return to clinic with any worsening pain, redness, swelling, red streaking or signs of systemic infection    No evidence of acute bony injury on x-ray.  Possibility of occult fracture discussed, but my clinical suspicion for this is low at this time. However, if  symptoms have not significantly improved within 7 to 10 days I recommend that he follow-up with PCP for reimaging.  Symptoms most likely secondary to a sprain.  Recommend that patient elevate, ice, and NSAIDs as needed.  Avoid aggravating activities/movements.  Symptoms should gradually improve over the next 3 to 5 days and fully resolve in the next 3 to 4 weeks.  If symptoms or not improving within this timeframe, new symptoms develop, current symptoms worsen recommend immediate medical reevaluation.  .  Differential diagnosis, natural history, supportive care, and indications for immediate follow-up discussed.

## 2022-06-14 ENCOUNTER — APPOINTMENT (RX ONLY)
Dept: URBAN - METROPOLITAN AREA CLINIC 6 | Facility: CLINIC | Age: 57
Setting detail: DERMATOLOGY
End: 2022-06-14

## 2022-06-14 DIAGNOSIS — D22 MELANOCYTIC NEVI: ICD-10-CM

## 2022-06-14 DIAGNOSIS — L81.4 OTHER MELANIN HYPERPIGMENTATION: ICD-10-CM

## 2022-06-14 DIAGNOSIS — D18.0 HEMANGIOMA: ICD-10-CM

## 2022-06-14 DIAGNOSIS — Z71.89 OTHER SPECIFIED COUNSELING: ICD-10-CM

## 2022-06-14 DIAGNOSIS — L82.1 OTHER SEBORRHEIC KERATOSIS: ICD-10-CM

## 2022-06-14 DIAGNOSIS — D485 NEOPLASM OF UNCERTAIN BEHAVIOR OF SKIN: ICD-10-CM

## 2022-06-14 PROBLEM — D18.01 HEMANGIOMA OF SKIN AND SUBCUTANEOUS TISSUE: Status: ACTIVE | Noted: 2022-06-14

## 2022-06-14 PROBLEM — D48.5 NEOPLASM OF UNCERTAIN BEHAVIOR OF SKIN: Status: ACTIVE | Noted: 2022-06-14

## 2022-06-14 PROBLEM — D22.5 MELANOCYTIC NEVI OF TRUNK: Status: ACTIVE | Noted: 2022-06-14

## 2022-06-14 PROCEDURE — 99203 OFFICE O/P NEW LOW 30 MIN: CPT | Mod: 25

## 2022-06-14 PROCEDURE — ? BIOPSY BY SHAVE METHOD

## 2022-06-14 PROCEDURE — ? COUNSELING

## 2022-06-14 PROCEDURE — 11102 TANGNTL BX SKIN SINGLE LES: CPT

## 2022-06-14 ASSESSMENT — LOCATION ZONE DERM
LOCATION ZONE: ARM
LOCATION ZONE: TRUNK

## 2022-06-14 ASSESSMENT — LOCATION SIMPLE DESCRIPTION DERM
LOCATION SIMPLE: RIGHT UPPER BACK
LOCATION SIMPLE: ABDOMEN
LOCATION SIMPLE: LEFT UPPER ARM
LOCATION SIMPLE: RIGHT UPPER ARM
LOCATION SIMPLE: LEFT UPPER BACK
LOCATION SIMPLE: LOWER BACK

## 2022-06-14 ASSESSMENT — LOCATION DETAILED DESCRIPTION DERM
LOCATION DETAILED: RIGHT SUPERIOR MEDIAL UPPER BACK
LOCATION DETAILED: PERIUMBILICAL SKIN
LOCATION DETAILED: RIGHT ANTERIOR PROXIMAL UPPER ARM
LOCATION DETAILED: LEFT ANTERIOR PROXIMAL UPPER ARM
LOCATION DETAILED: SUPERIOR LUMBAR SPINE
LOCATION DETAILED: LEFT INFERIOR UPPER BACK
LOCATION DETAILED: RIGHT SUPERIOR UPPER BACK

## 2022-06-14 NOTE — PROCEDURE: BIOPSY BY SHAVE METHOD
Detail Level: Simple
Depth Of Biopsy: dermis
Was A Bandage Applied: Yes
Size Of Lesion In Cm: 0.5
Biopsy Type: H and E
Biopsy Method: Dermablade
Anesthesia Type: 1% lidocaine with epinephrine
Additional Anesthesia Volume In Cc (Will Not Render If 0): 0
Hemostasis: Drysol
Wound Care: Petrolatum
Dressing: bandage
Destruction After The Procedure: No
Type Of Destruction Used: Curettage
Curettage Text: The wound bed was treated with curettage after the biopsy was performed.
Cryotherapy Text: The wound bed was treated with cryotherapy after the biopsy was performed.
Electrodesiccation Text: The wound bed was treated with electrodesiccation after the biopsy was performed.
Electrodesiccation And Curettage Text: The wound bed was treated with electrodesiccation and curettage after the biopsy was performed.
Silver Nitrate Text: The wound bed was treated with silver nitrate after the biopsy was performed.
Lab: [id number]
Lab Facility: 
Consent: Written consent was obtained and risks were reviewed including but not limited to scarring, infection, bleeding, scabbing, incomplete removal, nerve damage and allergy to anesthesia.
Post-Care Instructions: I reviewed with the patient in detail post-care instructions. Patient is to keep the biopsy site dry overnight, and then apply bacitracin twice daily until healed. Patient may apply hydrogen peroxide soaks to remove any crusting.
Notification Instructions: Patient will be notified of biopsy results. However, patient instructed to call the office if not contacted within 2 weeks.
Billing Type: Third-Party Bill
Information: Selecting Yes will display possible errors in your note based on the variables you have selected. This validation is only offered as a suggestion for you. PLEASE NOTE THAT THE VALIDATION TEXT WILL BE REMOVED WHEN YOU FINALIZE YOUR NOTE. IF YOU WANT TO FAX A PRELIMINARY NOTE YOU WILL NEED TO TOGGLE THIS TO 'NO' IF YOU DO NOT WANT IT IN YOUR FAXED NOTE.

## 2022-07-05 ENCOUNTER — OFFICE VISIT (OUTPATIENT)
Dept: MEDICAL GROUP | Facility: PHYSICIAN GROUP | Age: 57
End: 2022-07-05
Payer: COMMERCIAL

## 2022-07-05 VITALS
BODY MASS INDEX: 37.13 KG/M2 | DIASTOLIC BLOOD PRESSURE: 88 MMHG | HEIGHT: 68 IN | HEART RATE: 82 BPM | TEMPERATURE: 98.1 F | WEIGHT: 245 LBS | OXYGEN SATURATION: 96 % | RESPIRATION RATE: 18 BRPM | SYSTOLIC BLOOD PRESSURE: 128 MMHG

## 2022-07-05 DIAGNOSIS — R42 DIZZINESS: ICD-10-CM

## 2022-07-05 DIAGNOSIS — I10 ESSENTIAL HYPERTENSION: ICD-10-CM

## 2022-07-05 DIAGNOSIS — F33.41 RECURRENT MAJOR DEPRESSIVE DISORDER, IN PARTIAL REMISSION (HCC): ICD-10-CM

## 2022-07-05 DIAGNOSIS — E78.5 DYSLIPIDEMIA: ICD-10-CM

## 2022-07-05 DIAGNOSIS — Z12.5 SPECIAL SCREENING, PROSTATE CANCER: ICD-10-CM

## 2022-07-05 DIAGNOSIS — H61.23 BILATERAL IMPACTED CERUMEN: ICD-10-CM

## 2022-07-05 PROCEDURE — 69209 REMOVE IMPACTED EAR WAX UNI: CPT | Mod: 50 | Performed by: NURSE PRACTITIONER

## 2022-07-05 PROCEDURE — 99214 OFFICE O/P EST MOD 30 MIN: CPT | Mod: 25 | Performed by: NURSE PRACTITIONER

## 2022-07-05 ASSESSMENT — FIBROSIS 4 INDEX: FIB4 SCORE: 1.15

## 2022-07-05 ASSESSMENT — PATIENT HEALTH QUESTIONNAIRE - PHQ9: CLINICAL INTERPRETATION OF PHQ2 SCORE: 0

## 2022-07-05 NOTE — ASSESSMENT & PLAN NOTE
Chronic condition, stable.  Patient feels he is doing much better and would like to discontinue his citalopram.  Taper plan given to patient.  He will take citalopram 10 mg x 30 days, then decrease to 10 mg every other day for 14 days and then discontinue. He will follow up if he is having difficulty with side-effects while he is weaning off.

## 2022-07-06 NOTE — ASSESSMENT & PLAN NOTE
Chronic condition, stable.  Patient takes olmesartan-HCTZ 40-25 mg daily. BP is well controlled.  He is due for updated labs.  Continue current regimen.

## 2022-07-06 NOTE — ASSESSMENT & PLAN NOTE
Chronic condition, stable.  Patient takes fenofibrate 145 mg daily as well as atorvastatin 40 mg daily.  He tolerates this regimen without adverse side-effect. He is due for updated labs. He will continue his current regimen.

## 2022-07-06 NOTE — ASSESSMENT & PLAN NOTE
Acute condition, unstable. As detailed above, patient has dizziness with noted bilateral cerumen impaction.  Symptoms resolved after the below procedure:    Procedure: Cerumen Removal  Risks and benefits of procedure discussed with patient.  Cerumen removed with  lavage   Patient tolerated the procedure well  Pt educated about proper care of ear canal. Q-tip cleaning discouraged, use of debrox and warm water lavage discussed.  Post lavage curette performed by provider, Post procedure exam with clear canal and normal TM.

## 2022-07-06 NOTE — PROGRESS NOTES
"Subjective:     CC: Diagnoses of Dizziness, Bilateral impacted cerumen, Recurrent major depressive disorder, in partial remission (HCC), Essential hypertension, Dyslipidemia, and Special screening, prostate cancer were pertinent to this visit.      HPI:   Lawrence is an established patient of mine here for follow-up.  We discussed the following problems:    1. Dizziness  Acute condition, unstable. Patient is here for evaluation today.    2. Bilateral impacted cerumen  Acute condition, unstable. Patient is here for evaluation today.    3. Recurrent major depressive disorder, in partial remission (HCC)  Chronic condition, stable. Patient is here for evaluation today.    4. Essential hypertension  Chronic condition, stable. Patient is here for evaluation today.    5. Dyslipidemia  Chronic condition, stable. Patient is here for evaluation today.    6. Special screening, prostate cancer  PSA ordered today.       Past Medical History:   Diagnosis Date   • Anxiety    • Dyslipidemia 11/17/2010   • Elevated liver function tests 11/17/2010   • Hiatus hernia syndrome 12/21 or 01/22    Found on CT with [place] DX.   • High cholesterol    • HTN (hypertension) 11/17/2010   • Hyperlipidemia    • Hypertension    • Infectious disease     chickenpox   • Pacemaker     \"Extra heart beat\" Cardiology w/[facility]   • Psychiatric problem     Anxiety       Social History     Tobacco Use   • Smoking status: Never Smoker   • Smokeless tobacco: Never Used   Vaping Use   • Vaping Use: Never used   Substance Use Topics   • Alcohol use: Yes     Alcohol/week: 1.8 oz     Types: 1 Glasses of wine, 2 Cans of beer per week   • Drug use: No       Current Outpatient Medications Ordered in Epic   Medication Sig Dispense Refill   • olmesartan-hydrochlorothiazide (BENICAR HCT) 40-25 MG per tablet Take 1 Tablet by mouth every day. 30 Tablet 3   • ibuprofen (MOTRIN) 800 MG Tab Take 1 Tablet by mouth every 8 hours as needed. AS NEEDED FOR MILD PAIN. 30 Tablet 1 " "  • atorvastatin (LIPITOR) 40 MG Tab Take 40 mg by mouth every day.     • amLODIPine (NORVASC) 10 MG Tab Take 1 Tablet by mouth every day. 90 Tablet 3   • fenofibrate (TRICOR) 145 MG Tab Take 1 Tablet by mouth every day. 90 Tablet 3   • citalopram (CELEXA) 20 MG Tab Take 1 tablet by mouth every day. 90 tablet 3   • aspirin (ASA) 325 MG TABS Take 325 mg by mouth every day.     • docosahexanoic acid (OMEGA 3 FA) 1000 MG Cap Take 1,000 mg by mouth 2 Times a Day.     • vitamin D (CHOLECALCIFEROL) 1000 UNIT TABS Take 5,000 Units by mouth every day.     • meclizine (ANTIVERT) 12.5 MG Tab meclizine 12.5 mg tablet   Take 2 tablets 3 times a day by oral route. (Patient not taking: Reported on 5/24/2022)       No current [facility]-ordered facility-administered medications on file.       Allergies:  Patient has no known allergies.    Health Maintenance: Completed    ROS:  Gen: no fevers/chills, no changes in weight  Eyes: no changes in vision  ENT: no sore throat, no hearing loss, no bloody nose  Pulm: no sob, no cough  CV: no chest pain, no palpitations  GI: no nausea/vomiting, no diarrhea  : no dysuria  MSk: no myalgias  Skin: no rash  Neuro: +dizziness  Heme/Lymph: no easy bruising      Objective:       Exam:  /88 (BP Location: Right arm, Patient Position: Sitting, BP Cuff Size: Large adult)   Pulse 82   Temp 36.7 °C (98.1 °F) (Temporal)   Resp 18   Ht 1.727 m (5' 8\")   Wt 111 kg (245 lb)   SpO2 96%   BMI 37.25 kg/m²  Body mass index is 37.25 kg/m².    Gen: Alert and oriented, No apparent distress.  HEENT:  CNII-XII intact.  PERRLA. EOMI.  Ears with impacted cerumen bilaterally.  Ears completely clear s/p irrigation as detailed below.  Neck: Neck is supple without lymphadenopathy.   Lungs: Normal effort, CTA bilaterally, no wheezes, rhonchi, or rales  CV: Regular rate and rhythm. No murmurs, rubs, or gallops.  Ext: No clubbing, cyanosis, edema.    Labs: Dated: None    Assessment & Plan:     57 y.o. male with the " following -     Dizziness  Acute condition.  Patient started with dizziness when laying down. He noticed this when working on son's car about three weeks ago.  Describes dizziness without the room spinning, feels like he will pass out.  He gets tunnel vision but then it goes away.  Patient states that when he sits up it is better, and then after a few minutes if he stands this is better and he can go on with what he is doing.  This happens at no other time other than laying down.  Patient is neurologically intact on exam today, but his bilateral ears are impacted with cerumen.  After cerumen removal, patient laid down on exam table and had very little dizziness.  He reports the symptoms resolved.  Patient will observe his symptoms over the next few days and if the dizziness returns he will return for reevaluation.     Bilateral impacted cerumen  Acute condition, unstable. As detailed above, patient has dizziness with noted bilateral cerumen impaction.  Symptoms resolved after the below procedure:    Procedure: Cerumen Removal  Risks and benefits of procedure discussed with patient.  Cerumen removed with  lavage   Patient tolerated the procedure well  Pt educated about proper care of ear canal. Q-tip cleaning discouraged, use of debrox and warm water lavage discussed.  Post lavage curette performed by provider, Post procedure exam with clear canal and normal TM.    Depression  Chronic condition, stable.  Patient feels he is doing much better and would like to discontinue his citalopram.  Taper plan given to patient.  He will take citalopram 10 mg x 30 days, then decrease to 10 mg every other day for 14 days and then discontinue. He will follow up if he is having difficulty with side-effects while he is weaning off.       Essential hypertension  Chronic condition, stable.  Patient takes olmesartan-HCTZ 40-25 mg daily. BP is well controlled.  He is due for updated labs.  Continue current regimen.        Dyslipidemia  Chronic condition, stable.  Patient takes fenofibrate 145 mg daily as well as atorvastatin 40 mg daily.  He tolerates this regimen without adverse side-effect. He is due for updated labs. He will continue his current regimen.      Orders Placed This Encounter   • CBC WITHOUT DIFFERENTIAL   • Comp Metabolic Panel   • HEMOGLOBIN A1C   • TSH   • Lipid Profile   • PROSTATE SPECIFIC AG SCREENING   • FREE THYROXINE          Return in about 3 months (around 10/5/2022).    I have placed the below orders and discussed them with an approved delegating provider.  The MA is performing the below orders under the direction of Mary Valverde MD.    Please note that this dictation was created using voice recognition software. I have made every reasonable attempt to correct obvious errors, but I expect that there are errors of grammar and possibly content that I did not discover before finalizing the note.

## 2022-09-04 DIAGNOSIS — I10 PRIMARY HYPERTENSION: ICD-10-CM

## 2022-09-04 DIAGNOSIS — E78.1 HYPERTRIGLYCERIDEMIA: ICD-10-CM

## 2022-09-04 DIAGNOSIS — E78.5 DYSLIPIDEMIA: ICD-10-CM

## 2022-09-06 RX ORDER — FENOFIBRATE 145 MG/1
TABLET, COATED ORAL
Qty: 90 TABLET | Refills: 0 | Status: SHIPPED | OUTPATIENT
Start: 2022-09-06 | End: 2023-01-04 | Stop reason: SDUPTHER

## 2022-09-06 RX ORDER — OLMESARTAN MEDOXOMIL AND HYDROCHLOROTHIAZIDE 40/25 40; 25 MG/1; MG/1
TABLET ORAL
Qty: 30 TABLET | Refills: 0 | Status: SHIPPED | OUTPATIENT
Start: 2022-09-06 | End: 2022-11-02 | Stop reason: SDUPTHER

## 2022-10-21 SDOH — HEALTH STABILITY: PHYSICAL HEALTH: ON AVERAGE, HOW MANY DAYS PER WEEK DO YOU ENGAGE IN MODERATE TO STRENUOUS EXERCISE (LIKE A BRISK WALK)?: 7 DAYS

## 2022-10-21 SDOH — ECONOMIC STABILITY: HOUSING INSECURITY: IN THE LAST 12 MONTHS, HOW MANY PLACES HAVE YOU LIVED?: 1

## 2022-10-21 SDOH — ECONOMIC STABILITY: FOOD INSECURITY: WITHIN THE PAST 12 MONTHS, THE FOOD YOU BOUGHT JUST DIDN'T LAST AND YOU DIDN'T HAVE MONEY TO GET MORE.: NEVER TRUE

## 2022-10-21 SDOH — ECONOMIC STABILITY: TRANSPORTATION INSECURITY
IN THE PAST 12 MONTHS, HAS LACK OF TRANSPORTATION KEPT YOU FROM MEETINGS, WORK, OR FROM GETTING THINGS NEEDED FOR DAILY LIVING?: NO

## 2022-10-21 SDOH — ECONOMIC STABILITY: FOOD INSECURITY: WITHIN THE PAST 12 MONTHS, YOU WORRIED THAT YOUR FOOD WOULD RUN OUT BEFORE YOU GOT MONEY TO BUY MORE.: NEVER TRUE

## 2022-10-21 SDOH — ECONOMIC STABILITY: INCOME INSECURITY: HOW HARD IS IT FOR YOU TO PAY FOR THE VERY BASICS LIKE FOOD, HOUSING, MEDICAL CARE, AND HEATING?: SOMEWHAT HARD

## 2022-10-21 SDOH — ECONOMIC STABILITY: INCOME INSECURITY: IN THE LAST 12 MONTHS, WAS THERE A TIME WHEN YOU WERE NOT ABLE TO PAY THE MORTGAGE OR RENT ON TIME?: NO

## 2022-10-21 SDOH — HEALTH STABILITY: PHYSICAL HEALTH: ON AVERAGE, HOW MANY MINUTES DO YOU ENGAGE IN EXERCISE AT THIS LEVEL?: 60 MIN

## 2022-10-21 ASSESSMENT — SOCIAL DETERMINANTS OF HEALTH (SDOH)
HOW OFTEN DO YOU GET TOGETHER WITH FRIENDS OR RELATIVES?: ONCE A WEEK
HOW HARD IS IT FOR YOU TO PAY FOR THE VERY BASICS LIKE FOOD, HOUSING, MEDICAL CARE, AND HEATING?: SOMEWHAT HARD
DO YOU BELONG TO ANY CLUBS OR ORGANIZATIONS SUCH AS CHURCH GROUPS UNIONS, FRATERNAL OR ATHLETIC GROUPS, OR SCHOOL GROUPS?: YES
HOW OFTEN DO YOU ATTENT MEETINGS OF THE CLUB OR ORGANIZATION YOU BELONG TO?: MORE THAN 4 TIMES PER YEAR
IN A TYPICAL WEEK, HOW MANY TIMES DO YOU TALK ON THE PHONE WITH FAMILY, FRIENDS, OR NEIGHBORS?: ONCE A WEEK
HOW OFTEN DO YOU HAVE SIX OR MORE DRINKS ON ONE OCCASION: NEVER
HOW OFTEN DO YOU ATTEND CHURCH OR RELIGIOUS SERVICES?: MORE THAN 4 TIMES PER YEAR
HOW OFTEN DO YOU ATTENT MEETINGS OF THE CLUB OR ORGANIZATION YOU BELONG TO?: MORE THAN 4 TIMES PER YEAR
DO YOU BELONG TO ANY CLUBS OR ORGANIZATIONS SUCH AS CHURCH GROUPS UNIONS, FRATERNAL OR ATHLETIC GROUPS, OR SCHOOL GROUPS?: YES
HOW MANY DRINKS CONTAINING ALCOHOL DO YOU HAVE ON A TYPICAL DAY WHEN YOU ARE DRINKING: 1 OR 2
HOW OFTEN DO YOU GET TOGETHER WITH FRIENDS OR RELATIVES?: ONCE A WEEK
HOW OFTEN DO YOU HAVE A DRINK CONTAINING ALCOHOL: 2-3 TIMES A WEEK
IN A TYPICAL WEEK, HOW MANY TIMES DO YOU TALK ON THE PHONE WITH FAMILY, FRIENDS, OR NEIGHBORS?: ONCE A WEEK
HOW OFTEN DO YOU ATTEND CHURCH OR RELIGIOUS SERVICES?: MORE THAN 4 TIMES PER YEAR
WITHIN THE PAST 12 MONTHS, YOU WORRIED THAT YOUR FOOD WOULD RUN OUT BEFORE YOU GOT THE MONEY TO BUY MORE: NEVER TRUE

## 2022-10-21 ASSESSMENT — LIFESTYLE VARIABLES
SKIP TO QUESTIONS 9-10: 1
AUDIT-C TOTAL SCORE: 3
HOW OFTEN DO YOU HAVE SIX OR MORE DRINKS ON ONE OCCASION: NEVER
HOW MANY STANDARD DRINKS CONTAINING ALCOHOL DO YOU HAVE ON A TYPICAL DAY: 1 OR 2
HOW OFTEN DO YOU HAVE A DRINK CONTAINING ALCOHOL: 2-3 TIMES A WEEK

## 2022-10-24 ENCOUNTER — OFFICE VISIT (OUTPATIENT)
Dept: MEDICAL GROUP | Facility: PHYSICIAN GROUP | Age: 57
End: 2022-10-24
Payer: COMMERCIAL

## 2022-10-24 VITALS
DIASTOLIC BLOOD PRESSURE: 82 MMHG | WEIGHT: 243 LBS | HEIGHT: 68 IN | TEMPERATURE: 98 F | RESPIRATION RATE: 18 BRPM | SYSTOLIC BLOOD PRESSURE: 122 MMHG | HEART RATE: 95 BPM | BODY MASS INDEX: 36.83 KG/M2 | OXYGEN SATURATION: 97 %

## 2022-10-24 DIAGNOSIS — Z13.0 SCREENING FOR ENDOCRINE, METABOLIC AND IMMUNITY DISORDER: ICD-10-CM

## 2022-10-24 DIAGNOSIS — Z23 NEED FOR VACCINATION: ICD-10-CM

## 2022-10-24 DIAGNOSIS — Z13.228 SCREENING FOR ENDOCRINE, METABOLIC AND IMMUNITY DISORDER: ICD-10-CM

## 2022-10-24 DIAGNOSIS — B35.4 TINEA CORPORIS: ICD-10-CM

## 2022-10-24 DIAGNOSIS — Z13.29 SCREENING FOR ENDOCRINE, METABOLIC AND IMMUNITY DISORDER: ICD-10-CM

## 2022-10-24 DIAGNOSIS — Z12.5 PROSTATE CANCER SCREENING: ICD-10-CM

## 2022-10-24 PROCEDURE — 90471 IMMUNIZATION ADMIN: CPT | Performed by: PHYSICIAN ASSISTANT

## 2022-10-24 PROCEDURE — 90686 IIV4 VACC NO PRSV 0.5 ML IM: CPT | Performed by: PHYSICIAN ASSISTANT

## 2022-10-24 PROCEDURE — 99214 OFFICE O/P EST MOD 30 MIN: CPT | Mod: 25 | Performed by: PHYSICIAN ASSISTANT

## 2022-10-24 RX ORDER — THERMOMETER, ELECTRONIC,ORAL
EACH MISCELLANEOUS 2 TIMES DAILY
COMMUNITY
End: 2023-05-02

## 2022-10-24 RX ORDER — KETOCONAZOLE 20 MG/G
CREAM TOPICAL
Qty: 60 G | Refills: 0 | Status: SHIPPED | OUTPATIENT
Start: 2022-10-24

## 2022-10-24 ASSESSMENT — FIBROSIS 4 INDEX: FIB4 SCORE: 1.15

## 2022-10-24 NOTE — PROGRESS NOTES
"Subjective:     CC: Establish care, groin rash    HPI:   Lawrence presents today to reestablish care.  Previous PCP was Shweta CARLTON.  Patient was last evaluated in July. Feels well after weaning off citalopram.     Patient also states that he has been having an ongoing rash in his groin area for the last 5 weeks.  Has been using Tinactin 1% cream with minimal relief.  States that the area is not itchy but it does smell. No rashes in any other part of the body.    Past Medical History:   Diagnosis Date    Anxiety     Dyslipidemia 11/17/2010    Elevated liver function tests 11/17/2010    Hiatus hernia syndrome 12/21 or 01/22    Found on CT with [place] DX.    High cholesterol     HTN (hypertension) 11/17/2010    Hyperlipidemia     Hypertension     Infectious disease     chickenpox    Pacemaker     \"Extra heart beat\" Cardiology w/[facility]    Psychiatric problem     Anxiety       Social History     Tobacco Use    Smoking status: Never    Smokeless tobacco: Never   Vaping Use    Vaping Use: Never used   Substance Use Topics    Alcohol use: Yes     Alcohol/week: 1.8 oz     Types: 1 Glasses of wine, 2 Cans of beer per week    Drug use: No       Current Outpatient Medications Ordered in Epic   Medication Sig Dispense Refill    tolnaftate (TINACTIN) 1 % Cream Apply  topically 2 times a day.      ketoconazole (NIZORAL) 2 % Cream Apply thin layer to affected areas twice daily as needed for rash 60 g 0    fenofibrate (TRICOR) 145 MG Tab TAKE ONE TABLET BY MOUTH EVERY DAY 90 Tablet 0    olmesartan-hydrochlorothiazide (BENICAR HCT) 40-25 MG per tablet TAKE ONE TABLET BY MOUTH EVERY DAY 30 Tablet 0    ibuprofen (MOTRIN) 800 MG Tab Take 1 Tablet by mouth every 8 hours as needed. AS NEEDED FOR MILD PAIN. 30 Tablet 1    atorvastatin (LIPITOR) 40 MG Tab Take 40 mg by mouth every day.      amLODIPine (NORVASC) 10 MG Tab Take 1 Tablet by mouth every day. 90 Tablet 3    aspirin (ASA) 325 MG TABS Take 325 mg by mouth every day.      " "docosahexanoic acid (OMEGA 3 FA) 1000 MG Cap Take 1,000 mg by mouth 2 Times a Day.      vitamin D (CHOLECALCIFEROL) 1000 UNIT TABS Take 5,000 Units by mouth every day.       No current [facility]-ordered facility-administered medications on file.       Allergies:  Patient has no known allergies.    Health Maintenance: Completed    ROS:  Gen: no fevers/chills  Eyes: no changes in vision  ENT: no sore throat  Pulm: no sob, no cough  CV: no chest pain  GI: no nausea/vomiting  : no dysuria  MSk: no myalgias  Skin: Positive for rash  Neuro: no headaches  Psych: no depression, no anxiety    Objective:       Exam:  /82   Pulse 95   Temp 36.7 °C (98 °F) (Temporal)   Resp 18   Ht 1.727 m (5' 8\")   Wt 110 kg (243 lb)   SpO2 97%   BMI 36.95 kg/m²  Body mass index is 36.95 kg/m².    Gen: Alert and oriented, No apparent distress.  Skin: Warm, dry, good turgor, mildly hyperpigmented patch of erythema  surrounding inguinal groin folds bilaterally onto anterior thigh  HEENT: Normocephalic. Eyes conjunctiva clear lids without ptosis, pupils equal and reactive to light accommodation, ears normal shape and contour   Neck: Trachea midline, no masses, no thyromegaly  Lungs: Normal effort, CTA bilaterally, no wheezes, rhonchi, or rales  CV: Regular rate and rhythm. No murmurs, rubs, or gallops.  MSK: Normal gait, moves all extremities.  Neuro: Grossly non-focal.  Ext: No clubbing, cyanosis, edema.  Psych: Alert and oriented x3, normal affect and mood.     Labs: No recent labs to review    Assessment & Plan:     57 y.o. male with the following -     1. Tinea corporis  Acute reported history and exam findings consistent with tinea.  Do suspect that the patient's tinea infection is improving with Lotrimin, however will switch to ketoconazole cream twice daily as patient is still uncomfortable and is noticing an odor coming from the area.  Follow-up for new or worsening concerns  - ketoconazole (NIZORAL) 2 % Cream; Apply thin layer " to affected areas twice daily as needed for rash  Dispense: 60 g; Refill: 0    2. Prostate cancer screening  Patient counseled on the risks and benefits of PSA testing. He is aware that current guidelines for routine screening for prostate cancer are conflicting and that shared decision making is the recommendation.  Patient verbalizes understanding of risk of false positive, false negative, and need for referral and possibly prostate biopsy if positive and would like to have a PSA ordered.   - PROSTATE SPECIFIC AG SCREENING; Future    3. Screening for endocrine, metabolic and immunity disorder  - Comp Metabolic Panel; Future  - VITAMIN D,25 HYDROXY (DEFICIENCY); Future  - Lipid Profile; Future  - TSH WITH REFLEX TO FT4; Future  - HEMOGLOBIN A1C; Future    4. Need for vaccination  - INFLUENZA VACCINE QUAD INJ (PF)      I spent a total of 30 minutes with record review (including external notes and labs), exam, communication with the patient, communication with other providers, and documentation of this encounter.     Return for follow up labs, annual wellness or sooner if needed.    Please note that this dictation was created using voice recognition software. I have made every reasonable attempt to correct obvious errors, but I expect that there are errors of grammar and possibly content that I did not discover before finalizing the note.    Electronically signed by Eliza Cool PA-C on October 24, 2022

## 2022-11-02 DIAGNOSIS — I10 PRIMARY HYPERTENSION: ICD-10-CM

## 2022-11-02 RX ORDER — OLMESARTAN MEDOXOMIL AND HYDROCHLOROTHIAZIDE 40/25 40; 25 MG/1; MG/1
1 TABLET ORAL
Qty: 90 TABLET | Refills: 0 | Status: SHIPPED | OUTPATIENT
Start: 2022-11-02 | End: 2023-02-27

## 2022-11-16 DIAGNOSIS — I10 ESSENTIAL HYPERTENSION: ICD-10-CM

## 2022-11-16 RX ORDER — AMLODIPINE BESYLATE 10 MG/1
10 TABLET ORAL DAILY
Qty: 90 TABLET | Refills: 1 | Status: SHIPPED | OUTPATIENT
Start: 2022-11-16 | End: 2023-07-24

## 2022-11-16 RX ORDER — ATORVASTATIN CALCIUM 40 MG/1
40 TABLET, FILM COATED ORAL DAILY
Qty: 90 TABLET | Refills: 1 | Status: SHIPPED | OUTPATIENT
Start: 2022-11-16 | End: 2023-07-24 | Stop reason: SDUPTHER

## 2022-11-16 NOTE — TELEPHONE ENCOUNTER
Received request via: Patient    Was the patient seen in the last year in this department? Yes    Does the patient have an active prescription (recently filled or refills available) for medication(s) requested? No    Does the patient have residential Plus and need 100 day supply (blood pressure, diabetes and cholesterol meds only)? Patient does not have SCP

## 2023-01-04 DIAGNOSIS — E78.5 DYSLIPIDEMIA: ICD-10-CM

## 2023-01-04 DIAGNOSIS — E78.1 HYPERTRIGLYCERIDEMIA: ICD-10-CM

## 2023-01-04 RX ORDER — FENOFIBRATE 145 MG/1
145 TABLET, COATED ORAL
Qty: 90 TABLET | Refills: 1 | Status: SHIPPED | OUTPATIENT
Start: 2023-01-04 | End: 2023-08-28

## 2023-01-04 NOTE — TELEPHONE ENCOUNTER
Received request via: Patient    Was the patient seen in the last year in this department? Yes    Does the patient have an active prescription (recently filled or refills available) for medication(s) requested? No    Does the patient have [facility] Plus and need 100 day supply (blood pressure, diabetes and cholesterol meds only)? Patient does not have SCP

## 2023-01-12 LAB
25(OH)D3+25(OH)D2 SERPL-MCNC: 39.5 NG/ML (ref 30–100)
ALBUMIN SERPL-MCNC: 4.7 G/DL (ref 3.8–4.9)
ALBUMIN/GLOB SERPL: 1.9 {RATIO} (ref 1.2–2.2)
ALP SERPL-CCNC: 102 IU/L (ref 44–121)
ALT SERPL-CCNC: 52 IU/L (ref 0–44)
AST SERPL-CCNC: 35 IU/L (ref 0–40)
BILIRUB SERPL-MCNC: 0.9 MG/DL (ref 0–1.2)
BUN SERPL-MCNC: 13 MG/DL (ref 6–24)
BUN/CREAT SERPL: 12 (ref 9–20)
CALCIUM SERPL-MCNC: 9.9 MG/DL (ref 8.7–10.2)
CHLORIDE SERPL-SCNC: 104 MMOL/L (ref 96–106)
CHOLEST SERPL-MCNC: 144 MG/DL (ref 100–199)
CO2 SERPL-SCNC: 24 MMOL/L (ref 20–29)
CREAT SERPL-MCNC: 1.07 MG/DL (ref 0.76–1.27)
EGFRCR SERPLBLD CKD-EPI 2021: 80 ML/MIN/1.73
GLOBULIN SER CALC-MCNC: 2.5 G/DL (ref 1.5–4.5)
GLUCOSE SERPL-MCNC: 110 MG/DL (ref 70–99)
HBA1C MFR BLD: 6.1 % (ref 4.8–5.6)
HDLC SERPL-MCNC: 54 MG/DL
LABORATORY COMMENT REPORT: NORMAL
LDLC SERPL CALC-MCNC: 68 MG/DL (ref 0–99)
POTASSIUM SERPL-SCNC: 4.2 MMOL/L (ref 3.5–5.2)
PROT SERPL-MCNC: 7.2 G/DL (ref 6–8.5)
PSA SERPL-MCNC: 1.9 NG/ML (ref 0–4)
SODIUM SERPL-SCNC: 142 MMOL/L (ref 134–144)
TRIGL SERPL-MCNC: 123 MG/DL (ref 0–149)
TSH SERPL DL<=0.005 MIU/L-ACNC: 1.49 UIU/ML (ref 0.45–4.5)
VLDLC SERPL CALC-MCNC: 22 MG/DL (ref 5–40)

## 2023-02-26 DIAGNOSIS — I10 PRIMARY HYPERTENSION: ICD-10-CM

## 2023-02-27 RX ORDER — OLMESARTAN MEDOXOMIL AND HYDROCHLOROTHIAZIDE 40/25 40; 25 MG/1; MG/1
TABLET ORAL
Qty: 90 TABLET | Refills: 3 | Status: SHIPPED | OUTPATIENT
Start: 2023-02-27

## 2023-05-02 ENCOUNTER — OFFICE VISIT (OUTPATIENT)
Dept: MEDICAL GROUP | Facility: PHYSICIAN GROUP | Age: 58
End: 2023-05-02
Payer: COMMERCIAL

## 2023-05-02 VITALS
TEMPERATURE: 97.8 F | RESPIRATION RATE: 16 BRPM | HEART RATE: 73 BPM | DIASTOLIC BLOOD PRESSURE: 80 MMHG | WEIGHT: 238 LBS | HEIGHT: 68 IN | OXYGEN SATURATION: 94 % | BODY MASS INDEX: 36.07 KG/M2 | SYSTOLIC BLOOD PRESSURE: 118 MMHG

## 2023-05-02 DIAGNOSIS — G89.29 CHRONIC RIGHT SHOULDER PAIN: ICD-10-CM

## 2023-05-02 DIAGNOSIS — M79.674 PAIN OF TOE OF RIGHT FOOT: ICD-10-CM

## 2023-05-02 DIAGNOSIS — M25.511 CHRONIC RIGHT SHOULDER PAIN: ICD-10-CM

## 2023-05-02 DIAGNOSIS — R73.03 PREDIABETES: ICD-10-CM

## 2023-05-02 DIAGNOSIS — R42 DIZZINESS: ICD-10-CM

## 2023-05-02 DIAGNOSIS — E78.5 DYSLIPIDEMIA: Chronic | ICD-10-CM

## 2023-05-02 PROCEDURE — 99214 OFFICE O/P EST MOD 30 MIN: CPT | Performed by: PHYSICIAN ASSISTANT

## 2023-05-02 ASSESSMENT — PATIENT HEALTH QUESTIONNAIRE - PHQ9: CLINICAL INTERPRETATION OF PHQ2 SCORE: 0

## 2023-05-02 ASSESSMENT — FIBROSIS 4 INDEX: FIB4 SCORE: 1.1

## 2023-05-02 NOTE — PROGRESS NOTES
"Subjective:     CC: Shoulder pain    HPI:   Lawrence presents today with multiple concerns to discuss.  Firstly he would like to review his lab results from a couple of months ago.    Patient also states he has had chronic right shoulder pain for many years and is wondering if he has arthritis.      Also experiences right elbow pain, right big toe pain, and pain at the tip of the middle of his left finger. Toe pain is only on the great toe and sometimes gets pain when lifting his toe up. No swelling or erythema.     Shoulder pain is mostly posterior and worse when trying to lift his arm overhead. Had a shoulder scope last year and has had aching feeling since then. Did PT after the surgery.     Patient is also been experiencing dizziness every day for the last few months.    Past Medical History:   Diagnosis Date    Anxiety     Dyslipidemia 11/17/2010    Elevated liver function tests 11/17/2010    Hiatus hernia syndrome 12/21 or 01/22    Found on CT with [place] DX.    High cholesterol     HTN (hypertension) 11/17/2010    Hyperlipidemia     Hypertension     Infectious disease     chickenpox    Pacemaker     \"Extra heart beat\" Cardiology w/[facility]    Psychiatric problem     Anxiety       Social History     Tobacco Use    Smoking status: Never    Smokeless tobacco: Never   Vaping Use    Vaping Use: Never used   Substance Use Topics    Alcohol use: Yes     Alcohol/week: 1.8 oz     Types: 1 Glasses of wine, 2 Cans of beer per week    Drug use: No       Current Outpatient Medications Ordered in Epic   Medication Sig Dispense Refill    olmesartan-hydrochlorothiazide (BENICAR HCT) 40-25 MG per tablet TAKE 1 TABLET DAILY 90 Tablet 3    fenofibrate (TRICOR) 145 MG Tab Take 1 Tablet by mouth every day. 90 Tablet 1    amLODIPine (NORVASC) 10 MG Tab Take 1 Tablet by mouth every day. 90 Tablet 1    atorvastatin (LIPITOR) 40 MG Tab Take 1 Tablet by mouth every day. 90 Tablet 1    ketoconazole (NIZORAL) 2 % Cream Apply thin layer to " "affected areas twice daily as needed for rash 60 g 0    ibuprofen (MOTRIN) 800 MG Tab Take 1 Tablet by mouth every 8 hours as needed. AS NEEDED FOR MILD PAIN. 30 Tablet 1    aspirin (ASA) 325 MG TABS Take 325 mg by mouth every day.      docosahexanoic acid (OMEGA 3 FA) 1000 MG Cap Take 1,000 mg by mouth 2 Times a Day.      vitamin D (CHOLECALCIFEROL) 1000 UNIT TABS Take 5,000 Units by mouth every day.       No current [facility]-ordered facility-administered medications on file.       Allergies:  Patient has no known allergies.    Health Maintenance: Completed    ROS:  Gen: no fevers/chills  Eyes: no changes in vision  ENT: no sore throat  Pulm: no sob, no cough  CV: no chest pain  GI: no nausea/vomiting  : no dysuria  MSk: Positive for myalgias  Skin: no rash  Neuro: no headaches, positive for dizziness  Psych: no depression, no anxiety    Objective:       Exam:  /80   Pulse 73   Temp 36.6 °C (97.8 °F) (Temporal)   Resp 16   Ht 1.727 m (5' 8\")   Wt 108 kg (238 lb)   SpO2 94%   BMI 36.19 kg/m²  Body mass index is 36.19 kg/m².    Gen: Alert and oriented, No apparent distress.  Skin: Warm, dry, good turgor, no rashes in visible areas.  HEENT: Normocephalic. Eyes conjunctiva clear lids without ptosis, pupils equal and reactive to light accommodation, ears normal shape and contour, canals are clear bilaterally, tympanic membranes are benign, nasal mucosa benign, oropharynx is without erythema, edema or exudates.   Neck: Trachea midline, no masses, no thyromegaly  Lungs: Normal effort, CTA bilaterally, no wheezes, rhonchi, or rales  CV: Regular rate and rhythm. No murmurs, rubs, or gallops.  MSK: Normal gait, moves all extremities.  Neuro: Grossly non-focal.  Ext: No clubbing, cyanosis, edema.  Psych: Alert and oriented x3, normal affect and mood.     Labs: Labs from 1/10/2023 were reviewed and discussed with the patient. All questions were answered.     Assessment & Plan:     58 y.o. male with the following - "     1. Dizziness  Chronic, intermittent.  Suspect patient may be experiencing symptoms of BPPV as well as orthostatic hypotension.  Patient denies any chest pain or shortness of breath while his symptoms are happening.  Denies any headache or visual changes.  Does state that he was not drinking a lot of water but recently started seeing a nutritionist who recommended he drink more water.  Patient states he is usually drinking about 40 to 50 ounces of water per day so recommended increasing this amount as well as standing up slowly.  Referral to vestibular therapy also placed.  - Referral to Physical Therapy    2. Pain of toe of right foot  Chronic, worsening.  Patient has had worsening pain of his great right toe particularly with flexion.  Never gets any overlying skin changes.  Suspect arthritis is likely contributing to his symptoms.  Supportive care discussed.  Patient would like to proceed with an x-ray to further evaluate and declines a referral to a specialist today.  Follow-up on imaging results.  - DX-TOE(S) 2+ RIGHT; Future    3. Chronic right shoulder pain  Chronic, worsening.  Patient had shoulder surgery last year with Dr. Huang.  States he has had ongoing pain since then despite doing the appropriate physical therapy.  Different treatment options discussed with the patient.  Did recommend referral back to Ortho, but patient does not want to do a lot of intervention at this time and would like to start which is getting an x-ray of his shoulder to evaluate for arthritis.  We will follow-up based on the results of the imaging.  - DX-SHOULDER 2+ RIGHT; Future    4. Prediabetes  Chronic, worsening.  Recent blood work discussed with the patient.  Discussed being in the prediabetic range.  Recommended focusing on lifestyle modification including decreasing sugars and carbohydrate intake as well as increasing exercise to at least under 50 minutes/week.  Patient declines a referral to nutritionist today.   Patient is currently working with a nutritionist and we will plan to recheck lab work in the next month.    - HEMOGLOBIN A1C; Future  - Comp Metabolic Panel; Future    5. Dyslipidemia  Chronic, well controlled.  Patient has been working with a nutritionist and has changed his diet so did discuss that if LDL is under good control can potentially decrease dose of statin  - Lipid Profile; Future    I spent a total of 32 minutes with record review (including external notes and labs), exam, communication with the patient, communication with other providers, and documentation of this encounter.     Return for follow up labs.    Please note that this dictation was created using voice recognition software. I have made every reasonable attempt to correct obvious errors, but I expect that there are errors of grammar and possibly content that I did not discover before finalizing the note.    Electronically signed by Eliza Cool PA-C on May 2, 2023

## 2023-05-12 ENCOUNTER — HOSPITAL ENCOUNTER (OUTPATIENT)
Dept: RADIOLOGY | Facility: MEDICAL CENTER | Age: 58
End: 2023-05-12
Attending: PHYSICIAN ASSISTANT
Payer: COMMERCIAL

## 2023-05-12 DIAGNOSIS — M79.674 PAIN OF TOE OF RIGHT FOOT: ICD-10-CM

## 2023-05-12 DIAGNOSIS — M25.511 CHRONIC RIGHT SHOULDER PAIN: ICD-10-CM

## 2023-05-12 DIAGNOSIS — G89.29 CHRONIC RIGHT SHOULDER PAIN: ICD-10-CM

## 2023-05-12 LAB — HBA1C MFR BLD: 5.7 % (ref ?–5.8)

## 2023-05-12 PROCEDURE — 73030 X-RAY EXAM OF SHOULDER: CPT | Mod: RT

## 2023-05-12 PROCEDURE — 73660 X-RAY EXAM OF TOE(S): CPT | Mod: RT

## 2023-07-20 DIAGNOSIS — I10 ESSENTIAL HYPERTENSION: ICD-10-CM

## 2023-07-24 RX ORDER — AMLODIPINE BESYLATE 10 MG/1
10 TABLET ORAL DAILY
Qty: 90 TABLET | Refills: 3 | Status: SHIPPED | OUTPATIENT
Start: 2023-07-24

## 2023-07-24 RX ORDER — ATORVASTATIN CALCIUM 40 MG/1
40 TABLET, FILM COATED ORAL DAILY
Qty: 90 TABLET | Refills: 1 | Status: SHIPPED | OUTPATIENT
Start: 2023-07-24 | End: 2024-03-06 | Stop reason: SDUPTHER

## 2023-08-28 DIAGNOSIS — E78.1 HYPERTRIGLYCERIDEMIA: ICD-10-CM

## 2023-08-28 DIAGNOSIS — E78.5 DYSLIPIDEMIA: ICD-10-CM

## 2023-08-28 RX ORDER — FENOFIBRATE 145 MG/1
145 TABLET, COATED ORAL DAILY
Qty: 90 TABLET | Refills: 0 | Status: SHIPPED | OUTPATIENT
Start: 2023-08-28 | End: 2023-12-26

## 2023-12-21 DIAGNOSIS — E78.5 DYSLIPIDEMIA: ICD-10-CM

## 2023-12-21 DIAGNOSIS — E78.1 HYPERTRIGLYCERIDEMIA: ICD-10-CM

## 2023-12-26 RX ORDER — FENOFIBRATE 145 MG/1
145 TABLET, COATED ORAL DAILY
Qty: 90 TABLET | Refills: 3 | Status: SHIPPED | OUTPATIENT
Start: 2023-12-26

## 2024-01-09 ENCOUNTER — OFFICE VISIT (OUTPATIENT)
Dept: MEDICAL GROUP | Facility: PHYSICIAN GROUP | Age: 59
End: 2024-01-09
Payer: COMMERCIAL

## 2024-01-09 VITALS
BODY MASS INDEX: 34.1 KG/M2 | DIASTOLIC BLOOD PRESSURE: 88 MMHG | SYSTOLIC BLOOD PRESSURE: 120 MMHG | WEIGHT: 225 LBS | HEART RATE: 76 BPM | OXYGEN SATURATION: 97 % | RESPIRATION RATE: 14 BRPM | TEMPERATURE: 97.6 F | HEIGHT: 68 IN

## 2024-01-09 DIAGNOSIS — Z11.4 ENCOUNTER FOR SCREENING FOR HIV: ICD-10-CM

## 2024-01-09 DIAGNOSIS — E78.5 DYSLIPIDEMIA: Chronic | ICD-10-CM

## 2024-01-09 DIAGNOSIS — Z12.5 PROSTATE CANCER SCREENING: ICD-10-CM

## 2024-01-09 DIAGNOSIS — Z71.84 TRAVEL ADVICE ENCOUNTER: ICD-10-CM

## 2024-01-09 DIAGNOSIS — R73.03 PREDIABETES: ICD-10-CM

## 2024-01-09 DIAGNOSIS — R25.2 FOOT CRAMPS: ICD-10-CM

## 2024-01-09 DIAGNOSIS — N40.1 BENIGN PROSTATIC HYPERPLASIA WITH WEAK URINARY STREAM: ICD-10-CM

## 2024-01-09 DIAGNOSIS — Z13.228 SCREENING FOR ENDOCRINE, METABOLIC AND IMMUNITY DISORDER: ICD-10-CM

## 2024-01-09 DIAGNOSIS — I10 ESSENTIAL HYPERTENSION: Chronic | ICD-10-CM

## 2024-01-09 DIAGNOSIS — Z23 NEED FOR VACCINATION: ICD-10-CM

## 2024-01-09 DIAGNOSIS — Z13.0 SCREENING FOR ENDOCRINE, METABOLIC AND IMMUNITY DISORDER: ICD-10-CM

## 2024-01-09 DIAGNOSIS — R39.12 BENIGN PROSTATIC HYPERPLASIA WITH WEAK URINARY STREAM: ICD-10-CM

## 2024-01-09 DIAGNOSIS — Z13.29 SCREENING FOR ENDOCRINE, METABOLIC AND IMMUNITY DISORDER: ICD-10-CM

## 2024-01-09 PROCEDURE — 99214 OFFICE O/P EST MOD 30 MIN: CPT | Mod: 25 | Performed by: PHYSICIAN ASSISTANT

## 2024-01-09 PROCEDURE — 90471 IMMUNIZATION ADMIN: CPT | Performed by: PHYSICIAN ASSISTANT

## 2024-01-09 PROCEDURE — 90686 IIV4 VACC NO PRSV 0.5 ML IM: CPT | Performed by: PHYSICIAN ASSISTANT

## 2024-01-09 PROCEDURE — 3079F DIAST BP 80-89 MM HG: CPT | Performed by: PHYSICIAN ASSISTANT

## 2024-01-09 PROCEDURE — 3074F SYST BP LT 130 MM HG: CPT | Performed by: PHYSICIAN ASSISTANT

## 2024-01-09 RX ORDER — TAMSULOSIN HYDROCHLORIDE 0.4 MG/1
0.4 CAPSULE ORAL
Qty: 90 CAPSULE | Refills: 1 | Status: SHIPPED | OUTPATIENT
Start: 2024-01-09

## 2024-01-09 RX ORDER — MECLIZINE HCL 12.5 MG/1
TABLET ORAL
COMMUNITY
End: 2024-01-09

## 2024-01-09 ASSESSMENT — PATIENT HEALTH QUESTIONNAIRE - PHQ9: CLINICAL INTERPRETATION OF PHQ2 SCORE: 0

## 2024-01-09 ASSESSMENT — FIBROSIS 4 INDEX: FIB4 SCORE: 1.1

## 2024-01-09 NOTE — PROGRESS NOTES
"Subjective:     CC: Dizziness    HPI:   Lawrence presents today with multiple complaints to discuss.    Firstly has been experiencing some cramping on his left foot. Happens intermittently, feels like gregg horse. Has been getting massages which does help.     He also like to discuss ongoing dizziness that he has been experiencing.  Have talked about this before and referral to vestibular therapy was placed but he never went. Happens more frequently with dehydration.     Patient is also interested in getting back on treatment for his enlarged prostate.  Has been noticing increasing urinary symptoms. Was on tamsulosin which helped but was having ED.     Would also like to discuss weight loss. Has been eating healthier and exercising more.  Has lost about 13 pounds.    Past Medical History:   Diagnosis Date    Anxiety     Dyslipidemia 11/17/2010    Elevated liver function tests 11/17/2010    Hiatus hernia syndrome 12/21 or 01/22    Found on CT with [place] DX.    High cholesterol     HTN (hypertension) 11/17/2010    Hyperlipidemia     Hypertension     Infectious disease     chickenpox    Pacemaker     \"Extra heart beat\" Cardiology w/[facility]    Psychiatric problem     Anxiety       Social History     Tobacco Use    Smoking status: Never    Smokeless tobacco: Never   Vaping Use    Vaping Use: Never used   Substance Use Topics    Alcohol use: Yes     Alcohol/week: 1.8 oz     Types: 1 Glasses of wine, 2 Cans of beer per week    Drug use: No       Current Outpatient Medications Ordered in Epic   Medication Sig Dispense Refill    tamsulosin (FLOMAX) 0.4 MG capsule Take 1 Capsule by mouth 1/2 hour after breakfast. 90 Capsule 1    fenofibrate (TRICOR) 145 MG Tab TAKE 1 TABLET DAILY 90 Tablet 3    amLODIPine (NORVASC) 10 MG Tab TAKE 1 TABLET DAILY 90 Tablet 3    atorvastatin (LIPITOR) 40 MG Tab Take 1 Tablet by mouth every day. 90 Tablet 1    olmesartan-hydrochlorothiazide (BENICAR HCT) 40-25 MG per tablet TAKE 1 TABLET DAILY " "90 Tablet 3    ketoconazole (NIZORAL) 2 % Cream Apply thin layer to affected areas twice daily as needed for rash 60 g 0    ibuprofen (MOTRIN) 800 MG Tab Take 1 Tablet by mouth every 8 hours as needed. AS NEEDED FOR MILD PAIN. 30 Tablet 1    aspirin (ASA) 325 MG TABS Take 325 mg by mouth every day.      docosahexanoic acid (OMEGA 3 FA) 1000 MG Cap Take 1,000 mg by mouth 2 Times a Day.      vitamin D (CHOLECALCIFEROL) 1000 UNIT TABS Take 5,000 Units by mouth every day.       No current [facility]-ordered facility-administered medications on file.       Allergies:  Patient has no known allergies.    Health Maintenance: Completed    ROS:  Gen: no fevers/chills  Eyes: no changes in vision  ENT: no sore throat  Pulm: no sob, no cough  CV: no chest pain  GI: no nausea/vomiting  : no dysuria  Skin: no rash  Neuro: no headaches    Objective:       Exam:  /88   Pulse 76   Temp 36.4 °C (97.6 °F) (Temporal)   Resp 14   Ht 1.727 m (5' 8\")   Wt 102 kg (225 lb)   SpO2 97%   BMI 34.21 kg/m²  Body mass index is 34.21 kg/m².    Constitutional: Alert, no distress, well-groomed.  Skin: Warm, dry, good turgor, no rashes in visible areas.  Eye: Equal, round and reactive, conjunctiva clear, lids normal.  ENMT: Lips without lesions, good dentition, moist mucous membranes.  Neck: Trachea midline, no masses, no thyromegaly.  Respiratory: Unlabored respiratory effort, no cough.  MSK: Normal gait, moves all extremities. Left foot mildly tender to palpation along underside of foot at base of great toe.   Neuro: Grossly non-focal.   Psych: Alert and oriented x3, normal affect and mood.    Labs: Labs from 1/10/20 were reviewed and discussed with the patient. All questions were answered.     Assessment & Plan:     58 y.o. male with the following -     1. Foot cramps  New diagnosis, uncertain etiology.  Basic labs ordered to evaluate.  Referral to podiatry also placed for further evaluation and management.  Supportive care discussed " including stretching his foot daily, using a tennis or lacrosse ball to roll under his foot, magnesium lotion, etc.  Follow-up for new or worsening concerns.  - Referral to Podiatry    2. Travel advice encounter  Patient has upcoming trip to [place] but is not exactly sure where he is going to be staying but notes that it is a small town.  Referral placed for further evaluation and management.  - Referral to [facility]    3. Benign prostatic hyperplasia with weak urinary stream  Chronic, worsening.  Patient was previously on tamsulosin which was helpful for his LUTS, however he discontinued it because he was having ejaculation failure.  States that his symptoms have gotten worse so would like to restart on medication.  Educated about potential side effects of the medication, particularly dizziness given that he started experiencing orthostatic hypotension which can be exacerbated.  - tamsulosin (FLOMAX) 0.4 MG capsule; Take 1 Capsule by mouth 1/2 hour after breakfast.  Dispense: 90 Capsule; Refill: 1    4. Essential hypertension  Chronic, well-controlled.  Blood pressure in office today is 120/88.  Patient has been having episodes of orthostatic hypotension, particularly with dehydration.  I had a lengthy discussion with the patient the importance of staying well-hydrated particularly with the medications that he is already on to prevent dizziness.  Denies chest pain or shortness of breath.  Declines referral to specialist today for further evaluation of dizziness.  Because blood pressure is well-controlled, no medication changes made today.  Did discuss that if he is continuing to experience dizziness when he is well-hydrated can discuss adjusting medication if necessary.  - Comp Metabolic Panel; Future    5. Dyslipidemia  - Lipid Profile; Future    6. Prediabetes  - HEMOGLOBIN A1C; Future    7. Prostate cancer screening  - PROSTATE SPECIFIC AG SCREENING; Future    8. Screening for endocrine, metabolic and  immunity disorder  - VITAMIN D,25 HYDROXY (DEFICIENCY); Future  - TSH WITH REFLEX TO FT4; Future  - CBC WITHOUT DIFFERENTIAL; Future    9. Encounter for screening for HIV  - HIV AG/AB COMBO ASSAY SCREENING; Future    10. Need for vaccination  - INFLUENZA VACCINE QUAD INJ (PF)    I spent a total of 34 minutes with record review (including external notes and labs), exam, communication with the patient, communication with other providers, and documentation of this encounter.     Return for follow up labs.    Please note that this dictation was created using voice recognition software. I have made every reasonable attempt to correct obvious errors, but I expect that there are errors of grammar and possibly content that I did not discover before finalizing the note.    Electronically signed by Eliza Cool PA-C on January 9, 2024

## 2024-01-17 ENCOUNTER — TELEPHONE (OUTPATIENT)
Dept: HEALTH INFORMATION MANAGEMENT | Facility: OTHER | Age: 59
End: 2024-01-17
Payer: COMMERCIAL

## 2024-02-24 LAB
25(OH)D3+25(OH)D2 SERPL-MCNC: 42.1 NG/ML (ref 30–100)
ALBUMIN SERPL-MCNC: 4.7 G/DL (ref 3.8–4.9)
ALBUMIN/GLOB SERPL: 2 {RATIO} (ref 1.2–2.2)
ALP SERPL-CCNC: 75 IU/L (ref 44–121)
ALT SERPL-CCNC: 45 IU/L (ref 0–44)
AST SERPL-CCNC: 31 IU/L (ref 0–40)
BILIRUB SERPL-MCNC: 0.8 MG/DL (ref 0–1.2)
BUN SERPL-MCNC: 15 MG/DL (ref 8–27)
BUN/CREAT SERPL: 15 (ref 10–24)
CALCIUM SERPL-MCNC: 8.9 MG/DL (ref 8.6–10.2)
CHLORIDE SERPL-SCNC: 103 MMOL/L (ref 96–106)
CHOLEST SERPL-MCNC: 121 MG/DL (ref 100–199)
CO2 SERPL-SCNC: 22 MMOL/L (ref 20–29)
CREAT SERPL-MCNC: 0.99 MG/DL (ref 0.76–1.27)
EGFRCR SERPLBLD CKD-EPI 2021: 87 ML/MIN/1.73
ERYTHROCYTE [DISTWIDTH] IN BLOOD BY AUTOMATED COUNT: 12.7 % (ref 11.6–15.4)
GLOBULIN SER CALC-MCNC: 2.3 G/DL (ref 1.5–4.5)
GLUCOSE SERPL-MCNC: 94 MG/DL (ref 70–99)
HBA1C MFR BLD: 5.9 % (ref 4.8–5.6)
HCT VFR BLD AUTO: 43.1 % (ref 37.5–51)
HDLC SERPL-MCNC: 61 MG/DL
HGB BLD-MCNC: 14.5 G/DL (ref 13–17.7)
HIV 1+2 AB+HIV1 P24 AG SERPL QL IA: NON REACTIVE
LABORATORY COMMENT REPORT: NORMAL
LDLC SERPL CALC-MCNC: 46 MG/DL (ref 0–99)
MCH RBC QN AUTO: 29.2 PG (ref 26.6–33)
MCHC RBC AUTO-ENTMCNC: 33.6 G/DL (ref 31.5–35.7)
MCV RBC AUTO: 87 FL (ref 79–97)
NRBC BLD AUTO-RTO: NORMAL %
PLATELET # BLD AUTO: 237 X10E3/UL (ref 150–450)
POTASSIUM SERPL-SCNC: 4.1 MMOL/L (ref 3.5–5.2)
PROT SERPL-MCNC: 7 G/DL (ref 6–8.5)
PSA SERPL-MCNC: 2 NG/ML (ref 0–4)
RBC # BLD AUTO: 4.97 X10E6/UL (ref 4.14–5.8)
SODIUM SERPL-SCNC: 142 MMOL/L (ref 134–144)
TRIGL SERPL-MCNC: 63 MG/DL (ref 0–149)
TSH SERPL DL<=0.005 MIU/L-ACNC: 1.44 UIU/ML (ref 0.45–4.5)
VLDLC SERPL CALC-MCNC: 14 MG/DL (ref 5–40)
WBC # BLD AUTO: 4.9 X10E3/UL (ref 3.4–10.8)

## 2024-03-08 NOTE — TELEPHONE ENCOUNTER
Received request via: Pharmacy    Was the patient seen in the last year in this department? Yes    Does the patient have an active prescription (recently filled or refills available) for medication(s) requested? No    [facility]    Does the patient have [facility] Plus and need 100 day supply (blood pressure, diabetes and cholesterol meds only)? Patient does not have SCP

## 2024-03-11 RX ORDER — ATORVASTATIN CALCIUM 40 MG/1
40 TABLET, FILM COATED ORAL DAILY
Qty: 90 TABLET | Refills: 1 | Status: SHIPPED | OUTPATIENT
Start: 2024-03-11

## 2024-06-12 ENCOUNTER — HOSPITAL ENCOUNTER (OUTPATIENT)
Dept: RADIOLOGY | Facility: MEDICAL CENTER | Age: 59
End: 2024-06-12
Attending: PHYSICIAN ASSISTANT
Payer: COMMERCIAL

## 2024-06-12 ENCOUNTER — APPOINTMENT (OUTPATIENT)
Dept: MEDICAL GROUP | Facility: PHYSICIAN GROUP | Age: 59
End: 2024-06-12
Payer: COMMERCIAL

## 2024-06-12 VITALS
TEMPERATURE: 98.4 F | HEART RATE: 90 BPM | OXYGEN SATURATION: 97 % | SYSTOLIC BLOOD PRESSURE: 114 MMHG | DIASTOLIC BLOOD PRESSURE: 74 MMHG | BODY MASS INDEX: 33.8 KG/M2 | HEIGHT: 68 IN | WEIGHT: 223 LBS | RESPIRATION RATE: 12 BRPM

## 2024-06-12 DIAGNOSIS — N40.1 BENIGN PROSTATIC HYPERPLASIA WITH WEAK URINARY STREAM: ICD-10-CM

## 2024-06-12 DIAGNOSIS — M25.562 CHRONIC PAIN OF LEFT KNEE: ICD-10-CM

## 2024-06-12 DIAGNOSIS — G89.29 CHRONIC PAIN OF LEFT KNEE: ICD-10-CM

## 2024-06-12 DIAGNOSIS — G89.29 CHRONIC RIGHT SHOULDER PAIN: ICD-10-CM

## 2024-06-12 DIAGNOSIS — M25.511 CHRONIC RIGHT SHOULDER PAIN: ICD-10-CM

## 2024-06-12 DIAGNOSIS — R39.12 BENIGN PROSTATIC HYPERPLASIA WITH WEAK URINARY STREAM: ICD-10-CM

## 2024-06-12 PROCEDURE — 3078F DIAST BP <80 MM HG: CPT | Performed by: PHYSICIAN ASSISTANT

## 2024-06-12 PROCEDURE — 3074F SYST BP LT 130 MM HG: CPT | Performed by: PHYSICIAN ASSISTANT

## 2024-06-12 PROCEDURE — 99214 OFFICE O/P EST MOD 30 MIN: CPT | Performed by: PHYSICIAN ASSISTANT

## 2024-06-12 PROCEDURE — 73030 X-RAY EXAM OF SHOULDER: CPT | Mod: RT

## 2024-06-12 PROCEDURE — 73564 X-RAY EXAM KNEE 4 OR MORE: CPT | Mod: LT

## 2024-06-12 RX ORDER — TAMSULOSIN HYDROCHLORIDE 0.4 MG/1
0.4 CAPSULE ORAL
Qty: 90 CAPSULE | Refills: 2 | Status: SHIPPED | OUTPATIENT
Start: 2024-06-12

## 2024-06-12 ASSESSMENT — FIBROSIS 4 INDEX: FIB4 SCORE: 1.19

## 2024-06-12 NOTE — PROGRESS NOTES
"Verbal consent was acquired by the patient to use [employer] ambient listening note generation during this visit     Subjective:     HPI:   History of Present Illness  The patient is a 59-year-old male here to discuss right shoulder pain and left knee pain.    The patient underwent bilateral shoulder arthroscopies and repair approximately 2 years ago, during which his biceps were shortened by approximately an inch. He experienced persistent low-level pain post-surgery, which he attributes to the recovery process. However, approximately a month ago, he began experiencing pain in his right shoulder, which he attributes to potential arthritis. He denies any specific injury, fall, or lifting heavy objects. He reports popping in the shoulder with movement and limited range of motion. He occasionally takes ibuprofen for pain management, which provides temporary relief. He received cortisone injections prior to his surgery and has recently started an online physical therapy program at work.    The patient suspects the presence of a bone spur on his kneecap, which is not present on his other knee. He experiences pain when kneeling and during exercises, which does not interfere with ambulation.    Supplemental Information  He has run out of refills for tamsulosin.    Health Maintenance: Completed    ROS:  Gen: no fevers/chills  Eyes: no changes in vision  ENT: no sore throat  Pulm: no sob, no cough  CV: no chest pain  GI: no nausea/vomiting  : no dysuria  MSk:positive for shoulder pain  Skin: no rash  Neuro: no headaches  Psych: no depression, no anxiety    Objective:     Exam:  /74   Pulse 90   Temp 36.9 °C (98.4 °F) (Temporal)   Resp 12   Ht 1.727 m (5' 8\")   Wt 101 kg (223 lb)   SpO2 97%   BMI 33.91 kg/m²  Body mass index is 33.91 kg/m².    PHYSICAL EXAM  Constitutional: Alert, no distress, well-groomed.  Skin: Warm, dry, good turgor, no rashes in visible areas.  Eye: Equal, round and reactive, conjunctiva " clear, lids normal.  ENMT: Lips without lesions, good dentition, moist mucous membranes.  Neck: Trachea midline, no masses, no thyromegaly.  Respiratory: Unlabored respiratory effort, no cough.  MSK: Normal gait, moves all extremities. Shoulders symmetrical, no obvious deformity, discoloration, muscle atrophy. No tenderness to sternoclavicular joint, clavicle, AC joint, bicipital groove, supraspinatus and infraspinatus muscles, scapular ridge, medial border of scapula. Decreased active and passive ROM. Distal circulation, sensation, strength 5/5 bilaterally.  Neuro: Grossly non-focal.   Psych: Alert and oriented x3, normal affect and mood.    Results      Assessment & Plan:     1. Chronic right shoulder pain  DX-SHOULDER 2+ RIGHT    MR-SHOULDER-W/O RIGHT    Referral to Physical Therapy    Referral to Sports Medicine      2. Chronic pain of left knee  DX-KNEE COMPLETE 4+ LEFT      3. Benign prostatic hyperplasia with weak urinary stream  tamsulosin (FLOMAX) 0.4 MG capsule          Assessment & Plan        1. Chronic right shoulder pain  Chronic, worsening. Updated imaging ordered and referral to sports medicine placed.  Do think patient would benefit from cortisone injection.  He is status post arthroscopy in that shoulder but would like to be conservative with management if possible.  Referral to physical therapy placed as well.  Patient is can continue with ibuprofen use as needed and declines prescription today.  Follow-up based on the imaging.  - DX-SHOULDER 2+ RIGHT; Future  - MR-SHOULDER-W/O RIGHT; Future  - Referral to Physical Therapy  - Referral to Sports Medicine    2. Chronic pain of left knee  New diagnosis.  Patient has slight protrusion causing pain when kneeling down on his left knee so would like to proceed with an x-ray.  Does not interfere with walking and does not cause daily pain.  Follow-up based on the results.  - DX-KNEE COMPLETE 4+ LEFT; Future    3. Benign prostatic hyperplasia with weak  urinary stream  Chronic, stable.  Refill of tamsulosin sent in today.  - tamsulosin (FLOMAX) 0.4 MG capsule; Take 1 Capsule by mouth 1/2 hour after breakfast.  Dispense: 90 Capsule; Refill: 2      I spent a total of 32 minutes with record review, exam, communication with the patient, communication with other providers, and documentation of this encounter.    Please note that this dictation was created using voice recognition software. I have made every reasonable attempt to correct obvious errors, but I expect that there are errors of grammar and possibly content that I did not discover before finalizing the note.

## 2024-07-03 ENCOUNTER — OFFICE VISIT (OUTPATIENT)
Dept: SPORTS MEDICINE | Facility: OTHER | Age: 59
End: 2024-07-03
Attending: PHYSICIAN ASSISTANT
Payer: COMMERCIAL

## 2024-07-03 VITALS
OXYGEN SATURATION: 95 % | HEIGHT: 68 IN | DIASTOLIC BLOOD PRESSURE: 84 MMHG | SYSTOLIC BLOOD PRESSURE: 130 MMHG | HEART RATE: 73 BPM | TEMPERATURE: 97.5 F | BODY MASS INDEX: 33.12 KG/M2 | RESPIRATION RATE: 16 BRPM | WEIGHT: 218.5 LBS

## 2024-07-03 DIAGNOSIS — G89.29 CHRONIC RIGHT SHOULDER PAIN: ICD-10-CM

## 2024-07-03 DIAGNOSIS — M25.511 CHRONIC RIGHT SHOULDER PAIN: ICD-10-CM

## 2024-07-03 PROCEDURE — 99213 OFFICE O/P EST LOW 20 MIN: CPT | Performed by: FAMILY MEDICINE

## 2024-07-03 PROCEDURE — 3079F DIAST BP 80-89 MM HG: CPT | Performed by: FAMILY MEDICINE

## 2024-07-03 PROCEDURE — 3075F SYST BP GE 130 - 139MM HG: CPT | Performed by: FAMILY MEDICINE

## 2024-07-03 ASSESSMENT — FIBROSIS 4 INDEX: FIB4 SCORE: 1.19

## 2024-07-13 DIAGNOSIS — I10 PRIMARY HYPERTENSION: ICD-10-CM

## 2024-07-15 RX ORDER — OLMESARTAN MEDOXOMIL AND HYDROCHLOROTHIAZIDE 40/25 40; 25 MG/1; MG/1
TABLET ORAL
Qty: 90 TABLET | Refills: 3 | Status: SHIPPED | OUTPATIENT
Start: 2024-07-15

## 2024-07-31 ENCOUNTER — HOSPITAL ENCOUNTER (OUTPATIENT)
Dept: RADIOLOGY | Facility: MEDICAL CENTER | Age: 59
End: 2024-07-31
Attending: PHYSICIAN ASSISTANT
Payer: COMMERCIAL

## 2024-07-31 DIAGNOSIS — G89.29 CHRONIC RIGHT SHOULDER PAIN: ICD-10-CM

## 2024-07-31 DIAGNOSIS — M25.511 CHRONIC RIGHT SHOULDER PAIN: ICD-10-CM

## 2024-07-31 PROCEDURE — 73221 MRI JOINT UPR EXTREM W/O DYE: CPT | Mod: RT

## 2024-08-06 ENCOUNTER — OFFICE VISIT (OUTPATIENT)
Dept: MEDICAL GROUP | Facility: PHYSICIAN GROUP | Age: 59
End: 2024-08-06
Payer: COMMERCIAL

## 2024-08-06 VITALS
BODY MASS INDEX: 34.56 KG/M2 | TEMPERATURE: 98.4 F | WEIGHT: 228 LBS | DIASTOLIC BLOOD PRESSURE: 80 MMHG | RESPIRATION RATE: 12 BRPM | SYSTOLIC BLOOD PRESSURE: 118 MMHG | HEIGHT: 68 IN | HEART RATE: 73 BPM | OXYGEN SATURATION: 96 %

## 2024-08-06 DIAGNOSIS — M25.511 CHRONIC RIGHT SHOULDER PAIN: ICD-10-CM

## 2024-08-06 DIAGNOSIS — E66.9 OBESITY (BMI 30-39.9): ICD-10-CM

## 2024-08-06 DIAGNOSIS — G89.29 CHRONIC RIGHT SHOULDER PAIN: ICD-10-CM

## 2024-08-06 DIAGNOSIS — R09.81 SINUS CONGESTION: ICD-10-CM

## 2024-08-06 PROCEDURE — 3074F SYST BP LT 130 MM HG: CPT | Performed by: PHYSICIAN ASSISTANT

## 2024-08-06 PROCEDURE — 99214 OFFICE O/P EST MOD 30 MIN: CPT | Performed by: PHYSICIAN ASSISTANT

## 2024-08-06 PROCEDURE — 3079F DIAST BP 80-89 MM HG: CPT | Performed by: PHYSICIAN ASSISTANT

## 2024-08-06 ASSESSMENT — FIBROSIS 4 INDEX: FIB4 SCORE: 1.19

## 2024-08-06 NOTE — PATIENT INSTRUCTIONS
ELECTRODIAGNOSTIC CONSULTATION    DATE:  09/27/2019    HISTORY:  The patient is a 44-year-old female with more than 4 months of pain  in the upper extremities, the neck, the low back, thighs with headaches.  She  had difficulty sleeping.     PHYSICAL EXAMINATION:  Shows normal reflexes and strength.  She does have  multiple myofascial tender points in the upper traps, pectorals, forearm  muscles, and low back.     CONDUCTION STUDIES:  Right median and ulnar distal motor and sensory latencies  are normal.  Forearm velocities are normal.     EMG examination of right upper extremity and paraspinal muscles showed no  abnormal spontaneous single muscle fiber discharges.  Motor unit action  potentials observed are normal.  Recruitment and interference patterns are  normal.     IMPRESSION:  Normal electrophysiologic examination of the right upper  extremity.     No evidence of cervical radiculopathy, peripheral neuropathy or carpal tunnel  syndrome.     Clinically, the patient has symptoms that are suggestive of fibromyalgia.      _____________________ ____________________________________  DATE AND TIME Ulices Martinez M.D.      URMILA/CYNDY-#[id number]  DD:  09/27/2019 13:23:35      DT:  09/27/2019 19:33:36        [facility] RAMONA ANGLIN                                   MRN#: [id number]  ELECTRODIAGNOSTIC CONSULTATION ACCT#: [id number]                                 ATTENDING MD: BETSEY LUZ MD                                   DATE OF PROCEDURE: 09/27/2                                 019                                                                           
Ray Med Sinus Rinse   Flonase   Antihistamine   
good balance

## 2024-08-06 NOTE — PROGRESS NOTES
"Verbal consent was acquired by the patient to use [employer] ambient listening note generation during this visit     Subjective:     HPI:   History of Present Illness  The patient is a 59-year-old male here for follow-up on shoulder pain and to discuss sinus congestion.    The patient's physical therapist has noted a deterioration in his arm. Despite undergoing physical therapy, he continues to experience significant discomfort. His surgeon is Dr. Huang.    The patient has been experiencing sinus congestion for an extended period, particularly when in a supine position. This issue has been persisting for a year. He has previously used Flonase for symptom management. He denies experiencing headaches or fever.    The patient has been actively trying to lose weight for the past 10 months. His daily routine includes walking his dog and cycling for 15 miles.    Health Maintenance: Completed    ROS:  Gen: no fevers/chills  Eyes: no changes in vision  ENT: no sore throat  Pulm: no sob, no cough  CV: no chest pain  GI: no nausea/vomiting  : no dysuria  MSk: no myalgias  Skin: no rash  Neuro: no headaches    Objective:     Exam:  /80   Pulse 73   Temp 36.9 °C (98.4 °F) (Temporal)   Resp 12   Ht 1.727 m (5' 8\")   Wt 103 kg (228 lb)   SpO2 96%   BMI 34.67 kg/m²  Body mass index is 34.67 kg/m².    PHYSICAL EXAM  Constitutional: Alert, no distress, well-groomed.  Skin: Warm, dry, good turgor, no rashes in visible areas.  Eye: Equal, round and reactive, conjunctiva clear, lids normal.  ENMT: Lips without lesions, good dentition, moist mucous membranes.  Neck: Trachea midline, no masses, no thyromegaly.  Respiratory: Unlabored respiratory effort, no cough.  MSK: Normal gait, moves all extremities.  Neuro: Grossly non-focal.   Psych: Alert and oriented x3, normal affect and mood.    Results  Imaging reviewed and discussed with the patient     Assessment & Plan:     1. Chronic right shoulder pain  Referral to " Orthopedics      2. Sinus congestion            Assessment & Plan        1. Chronic right shoulder pain  Chronic, not well controlled.  Recent MRI test results discussed in detail with the patient today.  Patient is still experiencing significant pain and has not noticed much improvement from physical therapy so sending referral back to Dr. Huang who was his surgeon previously for consultation and management.  - Referral to Orthopedics    2. Sinus congestion  Chronic, worsening.  Recommended daily sinus rinses as well as Flonase once or twice daily. Patient is instructed on how to correctly use Flonase to avoid epistaxis. Patient can also try Zyrtec or Claritin as needed.  Also briefly discussed trial of antibiotics for chronic sinusitis or imaging of the sinuses given the duration of his symptoms, patient would like to start with above measures and follow-up for new or worsening concerns.    3. Obesity (BMI 30-39.9)  Chronic, not at goal.  Encouraged diet high in fruits, vegetables, and fiber. And a diet low in salt, refined carbohydrates, cholesterol, saturated fat, and trans fatty acids. Also encouraged  a minimum of 30 minutes of moderate intensity aerobic exercise (eg, brisk walking) on five days each week to meet recommendations which the patient has been doing.        I spent a total of 31 minutes with record review, exam, communication with the patient, communication with other providers, and documentation of this encounter.    Please note that this dictation was created using voice recognition software. I have made every reasonable attempt to correct obvious errors, but I expect that there are errors of grammar and possibly content that I did not discover before finalizing the note.

## 2024-09-24 DIAGNOSIS — B35.4 TINEA CORPORIS: ICD-10-CM

## 2024-09-24 RX ORDER — KETOCONAZOLE 20 MG/G
CREAM TOPICAL
Qty: 60 G | Refills: 0 | Status: SHIPPED | OUTPATIENT
Start: 2024-09-24

## 2024-09-24 NOTE — TELEPHONE ENCOUNTER
Received request via: Pharmacy    Was the patient seen in the last year in this department? Yes    Does the patient have an active prescription (recently filled or refills available) for medication(s) requested? No    [facility]    Does the patient have [facility] Plus and need 100-day supply? (This applies to ALL medications) Patient does not have SCP

## 2024-12-11 DIAGNOSIS — I10 ESSENTIAL HYPERTENSION: ICD-10-CM

## 2024-12-11 RX ORDER — ATORVASTATIN CALCIUM 40 MG/1
40 TABLET, FILM COATED ORAL DAILY
Qty: 90 TABLET | Refills: 3 | Status: SHIPPED | OUTPATIENT
Start: 2024-12-11

## 2024-12-11 RX ORDER — AMLODIPINE BESYLATE 10 MG/1
10 TABLET ORAL DAILY
Qty: 90 TABLET | Refills: 3 | Status: SHIPPED | OUTPATIENT
Start: 2024-12-11

## 2024-12-11 NOTE — TELEPHONE ENCOUNTER
Received request via: Pharmacy    Was the patient seen in the last year in this department? Yes    Does the patient have an active prescription (recently filled or refills available) for medication(s) requested? No    Pharmacy Name:     [facility] [place] - [address] (Pharmacy) [phone]       Does the patient have [facility] Plus and need 100-day supply? (This applies to ALL medications) Patient does not have SCP

## 2025-01-31 DIAGNOSIS — E78.1 HYPERTRIGLYCERIDEMIA: ICD-10-CM

## 2025-01-31 DIAGNOSIS — E78.5 DYSLIPIDEMIA: ICD-10-CM

## 2025-01-31 NOTE — TELEPHONE ENCOUNTER
Received request via: Pharmacy    Was the patient seen in the last year in this department? Yes    Does the patient have an active prescription (recently filled or refills available) for medication(s) requested? No    [facility]    Does the patient have [facility] Plus and need 100-day supply? (This applies to ALL medications) Patient does not have SCP

## 2025-02-03 ENCOUNTER — APPOINTMENT (OUTPATIENT)
Dept: MEDICAL GROUP | Facility: PHYSICIAN GROUP | Age: 60
End: 2025-02-03
Payer: COMMERCIAL

## 2025-02-03 VITALS
OXYGEN SATURATION: 96 % | SYSTOLIC BLOOD PRESSURE: 118 MMHG | DIASTOLIC BLOOD PRESSURE: 80 MMHG | HEART RATE: 88 BPM | RESPIRATION RATE: 18 BRPM | WEIGHT: 225 LBS | HEIGHT: 68 IN | TEMPERATURE: 97.3 F | BODY MASS INDEX: 34.1 KG/M2

## 2025-02-03 DIAGNOSIS — E78.1 HYPERTRIGLYCERIDEMIA: ICD-10-CM

## 2025-02-03 DIAGNOSIS — G89.29 CHRONIC RIGHT SHOULDER PAIN: ICD-10-CM

## 2025-02-03 DIAGNOSIS — B35.4 TINEA CORPORIS: ICD-10-CM

## 2025-02-03 DIAGNOSIS — R42 DIZZINESS: ICD-10-CM

## 2025-02-03 DIAGNOSIS — M25.511 CHRONIC RIGHT SHOULDER PAIN: ICD-10-CM

## 2025-02-03 PROCEDURE — 99214 OFFICE O/P EST MOD 30 MIN: CPT | Performed by: PHYSICIAN ASSISTANT

## 2025-02-03 PROCEDURE — 3079F DIAST BP 80-89 MM HG: CPT | Performed by: PHYSICIAN ASSISTANT

## 2025-02-03 PROCEDURE — 3074F SYST BP LT 130 MM HG: CPT | Performed by: PHYSICIAN ASSISTANT

## 2025-02-03 RX ORDER — FENOFIBRATE 145 MG/1
145 TABLET, COATED ORAL DAILY
Qty: 90 TABLET | Refills: 3 | Status: CANCELLED | OUTPATIENT
Start: 2025-02-03

## 2025-02-03 RX ORDER — KETOCONAZOLE 20 MG/G
CREAM TOPICAL
Qty: 60 G | Refills: 0 | Status: SHIPPED | OUTPATIENT
Start: 2025-02-03 | End: 2025-02-03

## 2025-02-03 RX ORDER — OLMESARTAN MEDOXOMIL AND HYDROCHLOROTHIAZIDE 40/12.5 40; 12.5 MG/1; MG/1
1 TABLET ORAL DAILY
Qty: 100 TABLET | Refills: 1 | Status: SHIPPED | OUTPATIENT
Start: 2025-02-03 | End: 2026-03-10

## 2025-02-03 RX ORDER — KETOCONAZOLE 20 MG/G
CREAM TOPICAL
Qty: 60 G | Refills: 0 | Status: SHIPPED | OUTPATIENT
Start: 2025-02-03

## 2025-02-03 RX ORDER — FENOFIBRATE 145 MG/1
145 TABLET, COATED ORAL DAILY
Qty: 90 TABLET | Refills: 3 | Status: SHIPPED | OUTPATIENT
Start: 2025-02-03

## 2025-02-03 RX ORDER — IBUPROFEN 800 MG/1
800 TABLET, FILM COATED ORAL EVERY 8 HOURS PRN
Qty: 90 TABLET | Refills: 1 | Status: SHIPPED | OUTPATIENT
Start: 2025-02-03

## 2025-02-03 ASSESSMENT — PATIENT HEALTH QUESTIONNAIRE - PHQ9: CLINICAL INTERPRETATION OF PHQ2 SCORE: 0

## 2025-02-03 ASSESSMENT — FIBROSIS 4 INDEX: FIB4 SCORE: 1.19

## 2025-02-04 NOTE — TELEPHONE ENCOUNTER
Received request via: Pharmacy    Was the patient seen in the last year in this department? Yes    Does the patient have an active prescription (recently filled or refills available) for medication(s) requested? No    Pharmacy Name:     [facility] [address] [place] [address] (Pharmacy) [phone]       Does the patient have [facility] Plus and need 100-day supply? (This applies to ALL medications) Patient does not have SCP

## 2025-02-04 NOTE — PROGRESS NOTES
"Verbal consent was acquired by the patient to use [employer] ambient listening note generation during this visit     Subjective:     HPI:   History of Present Illness  The patient is a 59-year-old male presenting with chronic right shoulder pain, dizziness, and for medication management.    The patient has a history of bilateral shoulder arthroscopy, with the most recent procedure performed on the right shoulder two years ago. Postoperatively, he received an intra-articular corticosteroid injection in September, which provided symptomatic relief for three months. The pain has since recurred and is described as muscular in nature. He is contemplating a repeat magnetic resonance imaging (MRI) scan and has been advised that a shoulder arthroplasty may be necessary. The patient is scheduled for travel in April and seeks a treatment plan prior to his departure. He requests a refill of ibuprofen 800 mg, which has previously been effective in managing his pain.    The patient reports experiencing frequent episodes of dizziness, particularly when dehydrated, accompanied by leg weakness and a sensation of presyncope, which is alleviated by sitting or standing still. He consumes approximately 64 ounces of water daily, maintains regular meal times, does not use electrolyte supplements, and avoids adding salt to his food. He monitors his blood pressure at home and is considering increasing his water intake.    The patient also requests a refill of fenofibrate.    MEDICATIONS  Current: Amlodipine, hydrochlorothiazide, tamsulosin, fenofibrate, ketoconazole cream.  Past: Motrin    Health Maintenance: Completed    ROS:  Gen: no fevers/chills  Eyes: no changes in vision  ENT: no sore throat  Pulm: no sob, no cough  CV: no chest pain  GI: no nausea/vomiting  : no dysuria  MSk: no myalgias  Neuro: no headaches    Objective:     Exam:  /80   Pulse 88   Temp 36.3 °C (97.3 °F) (Temporal)   Resp 18   Ht 1.727 m (5' 8\")   Wt " 102 kg (225 lb)   SpO2 96%   BMI 34.21 kg/m²  Body mass index is 34.21 kg/m².    PHYSICAL EXAM  Constitutional: Alert, no distress, well-groomed.  Skin: Warm, dry, good turgor, no rashes in visible areas.  Eye: Equal, round and reactive, conjunctiva clear, lids normal.  ENMT: Lips without lesions, good dentition, moist mucous membranes.  Neck: Trachea midline, no masses, no thyromegaly.  Respiratory: Unlabored respiratory effort, no cough.  MSK: Normal gait, moves all extremities.  Neuro: Grossly non-focal.   Psych: Alert and oriented x3, normal affect and mood.    Results  MRI of right shoulder from July 31, 2024 reviewed and discussed    Assessment & Plan:     1. Chronic right shoulder pain        2. Hypertriglyceridemia        3. Tinea corporis  ketoconazole (NIZORAL) 2 % Cream      4. Dizziness            Assessment & Plan  1. Chronic right shoulder pain: Chronic, worsening.  - Consult surgery before March vacation for surgical options or another injection  - Prescribed Motrin 800 mg PRN, sent to [facility].  Educated about potential side effects of medication    2. Dizziness: Chronic, worsening.  - Increase water intake  - Incorporate electrolyte drinks  - Monitor BP every other day with a target of 130/90  - Reduced hydrochlorothiazide dosage to assess impact on dizziness, and decrease further if necessary and blood pressure is well-controlled    3. Medication management:   - Refill for fenofibrate sent to [facility]  - Prescription for ketoconazole cream sent to [facility]          I spent a total of 32 minutes with record review, exam, communication with the patient, communication with other providers, and documentation of this encounter.    Please note that this dictation was created using voice recognition software. I have made every reasonable attempt to correct obvious errors, but I expect that there are errors of grammar and possibly content that I did not discover before  finalizing the note.

## 2025-04-17 ENCOUNTER — OFFICE VISIT (OUTPATIENT)
Dept: URGENT CARE | Facility: PHYSICIAN GROUP | Age: 60
End: 2025-04-17
Payer: COMMERCIAL

## 2025-04-17 VITALS
HEIGHT: 68 IN | DIASTOLIC BLOOD PRESSURE: 80 MMHG | RESPIRATION RATE: 14 BRPM | SYSTOLIC BLOOD PRESSURE: 124 MMHG | HEART RATE: 85 BPM | OXYGEN SATURATION: 95 % | BODY MASS INDEX: 33.65 KG/M2 | TEMPERATURE: 97.9 F | WEIGHT: 222 LBS

## 2025-04-17 DIAGNOSIS — J01.90 ACUTE BACTERIAL SINUSITIS: ICD-10-CM

## 2025-04-17 DIAGNOSIS — B96.89 ACUTE BACTERIAL SINUSITIS: ICD-10-CM

## 2025-04-17 PROCEDURE — 3079F DIAST BP 80-89 MM HG: CPT

## 2025-04-17 PROCEDURE — 3074F SYST BP LT 130 MM HG: CPT

## 2025-04-17 PROCEDURE — 99213 OFFICE O/P EST LOW 20 MIN: CPT

## 2025-04-17 ASSESSMENT — ENCOUNTER SYMPTOMS
COUGH: 1
SINUS PAIN: 1

## 2025-04-17 ASSESSMENT — FIBROSIS 4 INDEX: FIB4 SCORE: 1.21

## 2025-04-17 NOTE — PROGRESS NOTES
"Subjective:   Lawrence Garrett is a 60 y.o. male who presents for URI (Has had a cold over last 2 weeks issues with congestion /Has a trip in next couple days )      URI   This is a new problem. The current episode started 1 to 4 weeks ago. The problem has been gradually worsening. Associated symptoms include congestion, coughing and sinus pain. Pertinent negatives include no abdominal pain, chest pain, diarrhea, dysuria, ear pain, headaches, nausea, rash, sore throat or vomiting. He has tried decongestant for the symptoms. The treatment provided no relief.       Review of Systems   Constitutional:  Negative for chills, fever and malaise/fatigue.   HENT:  Positive for congestion and sinus pain. Negative for ear pain, hearing loss and sore throat.    Respiratory:  Positive for cough. Negative for shortness of breath.    Cardiovascular:  Negative for chest pain.   Gastrointestinal:  Negative for abdominal pain, diarrhea, nausea and vomiting.   Genitourinary:  Negative for dysuria.   Musculoskeletal:  Negative for myalgias.   Skin:  Negative for rash.   Neurological:  Negative for headaches.       Medications, Allergies, and current problem list reviewed today in Epic.     Objective:     /80 (BP Location: Left arm, Patient Position: Sitting, BP Cuff Size: Adult)   Pulse 85   Temp 36.6 °C (97.9 °F) (Temporal)   Resp 14   Ht 1.727 m (5' 8\")   Wt 101 kg (222 lb)   SpO2 95%     Physical Exam  Vitals and nursing note reviewed.   Constitutional:       General: He is not in acute distress.     Appearance: Normal appearance. He is not ill-appearing.   HENT:      Head: Normocephalic and atraumatic.      Right Ear: Tympanic membrane normal.      Left Ear: Tympanic membrane normal.      Nose: Congestion present.      Right Turbinates: Enlarged.      Left Turbinates: Enlarged.      Right Sinus: Maxillary sinus tenderness present.      Left Sinus: Maxillary sinus tenderness present.      Mouth/Throat:      Mouth: " Mucous membranes are moist.      Pharynx: Oropharynx is clear.   Eyes:      Conjunctiva/sclera: Conjunctivae normal.      Pupils: Pupils are equal, round, and reactive to light.   Cardiovascular:      Rate and Rhythm: Normal rate.      Heart sounds: Normal heart sounds.   Pulmonary:      Effort: Pulmonary effort is normal.      Breath sounds: Normal breath sounds.   Abdominal:      General: Abdomen is flat.      Palpations: Abdomen is soft.   Skin:     General: Skin is warm and dry.      Capillary Refill: Capillary refill takes less than 2 seconds.   Neurological:      Mental Status: He is alert and oriented to person, place, and time.   Psychiatric:         Mood and Affect: Mood normal.         Behavior: Behavior normal.         Assessment/Plan:       1. Acute bacterial sinusitis  amoxicillin-clavulanate (AUGMENTIN) 875-125 MG Tab        After assessment does appear the patient has a possible sinusitis.  At this time patient was placed on Augmentin.  Patient instructed to take as prescribed.  Instructed to monitor for any worsening signs and symptoms of any other concerns he was instructed to return to urgent care for reevaluation.    Take full course of antibiotic  Tylenol and Motrin for fever and pain  OTC meds as discussed including oral decongestant if tolerated  Increase fluids and rest  Nasal spray, nasal rinse/wash, marylou pot    Differential diagnosis, natural history, and supportive care discussed. We also reviewed side effects of medication including allergic response, GI upset, tendon injury, rash, sedation etc. Patient and/or guardian voices understanding.      Advised the patient to follow-up with the primary care physician for recheck, reevaluation, and consideration of further management.    I personally reviewed prior external notes and test results pertinent to today's visit as well as additional imaging and testing completed in clinic today.     Please note that this dictation was created using  voice recognition software. I have made every reasonable attempt to correct obvious errors, but I expect that there are errors of grammar and possibly content that I did not discover before finalizing the note.    This note was electronically signed by VERONICA Rosario

## 2025-04-19 ASSESSMENT — ENCOUNTER SYMPTOMS
CHILLS: 0
HEADACHES: 0
SHORTNESS OF BREATH: 0
NAUSEA: 0
FEVER: 0
SORE THROAT: 0
ABDOMINAL PAIN: 0
MYALGIAS: 0
DIARRHEA: 0
VOMITING: 0

## 2025-05-30 ENCOUNTER — APPOINTMENT (OUTPATIENT)
Dept: ADMISSIONS | Facility: MEDICAL CENTER | Age: 60
End: 2025-05-30
Attending: ORTHOPAEDIC SURGERY
Payer: COMMERCIAL

## 2025-06-03 ENCOUNTER — PRE-ADMISSION TESTING (OUTPATIENT)
Dept: ADMISSIONS | Facility: MEDICAL CENTER | Age: 60
End: 2025-06-03
Attending: ORTHOPAEDIC SURGERY
Payer: COMMERCIAL

## 2025-06-03 VITALS — BODY MASS INDEX: 33.75 KG/M2 | HEIGHT: 68 IN

## 2025-06-03 DIAGNOSIS — Z01.812 PRE-OPERATIVE LABORATORY EXAMINATION: Primary | ICD-10-CM

## 2025-06-03 DIAGNOSIS — Z01.810 PRE-OPERATIVE CARDIOVASCULAR EXAMINATION: ICD-10-CM

## 2025-06-03 RX ORDER — FLUTICASONE PROPIONATE 50 MCG
1 SPRAY, SUSPENSION (ML) NASAL DAILY
COMMUNITY

## 2025-06-03 NOTE — PREADMIT AVS NOTE
Current Medications   Medication Instructions    Multiple Vitamins-Minerals (MULTIVITAMIN MEN PO) Stop 7 days before surgery    fluticasone (FLONASE) 50 MCG/ACT nasal spray As needed medication, may take if needed, including morning of procedure     Xylitol 500 MG DISK Follow instructions from surgeon or specialist.    fenofibrate (TRICOR) 145 MG Tab Stop 24 hours before surgery    ibuprofen (MOTRIN) 800 MG Tab Stop 5 days before surgery    olmesartan-hydrochlorothiazide (BENICAR HCT) 40-12.5 MG per tablet Stop 24 hours before surgery    ketoconazole (NIZORAL) 2 % Cream Follow instructions from surgeon or specialist.    atorvastatin (LIPITOR) 40 MG Tab Continue taking as prescribed.    amLODIPine (NORVASC) 10 MG Tab Continue taking as prescribed.    Omega-3 Fatty Acids (FISH OIL PO) Stop 7 days before surgery    tamsulosin (FLOMAX) 0.4 MG capsule Continue taking as prescribed.    aspirin (ASA) 325 MG TABS Follow instructions from surgeon or specialist.    vitamin D (CHOLECALCIFEROL) 1000 UNIT TABS Stop 7 days before surgery

## 2025-06-04 ENCOUNTER — PRE-ADMISSION TESTING (OUTPATIENT)
Dept: ADMISSIONS | Facility: MEDICAL CENTER | Age: 60
End: 2025-06-04
Attending: ORTHOPAEDIC SURGERY
Payer: COMMERCIAL

## 2025-06-04 VITALS — BODY MASS INDEX: 35.16 KG/M2 | HEIGHT: 68 IN | WEIGHT: 232 LBS

## 2025-06-04 DIAGNOSIS — Z01.810 PRE-OPERATIVE CARDIOVASCULAR EXAMINATION: ICD-10-CM

## 2025-06-04 DIAGNOSIS — Z01.812 PRE-OPERATIVE LABORATORY EXAMINATION: ICD-10-CM

## 2025-06-04 LAB
ALBUMIN SERPL BCP-MCNC: 4.4 G/DL (ref 3.2–4.9)
ALBUMIN/GLOB SERPL: 1.5 G/DL
ALP SERPL-CCNC: 77 U/L (ref 30–99)
ALT SERPL-CCNC: 22 U/L (ref 2–50)
ANION GAP SERPL CALC-SCNC: 11 MMOL/L (ref 7–16)
AST SERPL-CCNC: 25 U/L (ref 12–45)
BILIRUB SERPL-MCNC: 0.6 MG/DL (ref 0.1–1.5)
BUN SERPL-MCNC: 16 MG/DL (ref 8–22)
CALCIUM ALBUM COR SERPL-MCNC: 9.2 MG/DL (ref 8.5–10.5)
CALCIUM SERPL-MCNC: 9.5 MG/DL (ref 8.5–10.5)
CHLORIDE SERPL-SCNC: 106 MMOL/L (ref 96–112)
CO2 SERPL-SCNC: 23 MMOL/L (ref 20–33)
CREAT SERPL-MCNC: 1.03 MG/DL (ref 0.5–1.4)
EKG IMPRESSION: NORMAL
ERYTHROCYTE [DISTWIDTH] IN BLOOD BY AUTOMATED COUNT: 42.7 FL (ref 35.9–50)
GFR SERPLBLD CREATININE-BSD FMLA CKD-EPI: 82 ML/MIN/1.73 M 2
GLOBULIN SER CALC-MCNC: 2.9 G/DL (ref 1.9–3.5)
GLUCOSE SERPL-MCNC: 90 MG/DL (ref 65–99)
HCT VFR BLD AUTO: 44 % (ref 42–52)
HGB BLD-MCNC: 14.5 G/DL (ref 14–18)
MCH RBC QN AUTO: 29.3 PG (ref 27–33)
MCHC RBC AUTO-ENTMCNC: 33 G/DL (ref 32.3–36.5)
MCV RBC AUTO: 88.9 FL (ref 81.4–97.8)
PLATELET # BLD AUTO: 269 K/UL (ref 164–446)
PMV BLD AUTO: 10 FL (ref 9–12.9)
POTASSIUM SERPL-SCNC: 4.1 MMOL/L (ref 3.6–5.5)
PROT SERPL-MCNC: 7.3 G/DL (ref 6–8.2)
RBC # BLD AUTO: 4.95 M/UL (ref 4.7–6.1)
SCCMEC + MECA PNL NOSE NAA+PROBE: NEGATIVE
SCCMEC + MECA PNL NOSE NAA+PROBE: NEGATIVE
SODIUM SERPL-SCNC: 140 MMOL/L (ref 135–145)
WBC # BLD AUTO: 4.8 K/UL (ref 4.8–10.8)

## 2025-06-04 PROCEDURE — 80053 COMPREHEN METABOLIC PANEL: CPT

## 2025-06-04 PROCEDURE — 85027 COMPLETE CBC AUTOMATED: CPT

## 2025-06-04 PROCEDURE — 36415 COLL VENOUS BLD VENIPUNCTURE: CPT

## 2025-06-04 PROCEDURE — 87640 STAPH A DNA AMP PROBE: CPT

## 2025-06-04 PROCEDURE — 87641 MR-STAPH DNA AMP PROBE: CPT

## 2025-06-04 PROCEDURE — 93005 ELECTROCARDIOGRAM TRACING: CPT | Mod: TC

## 2025-06-04 PROCEDURE — 93010 ELECTROCARDIOGRAM REPORT: CPT | Performed by: INTERNAL MEDICINE

## 2025-06-04 ASSESSMENT — FIBROSIS 4 INDEX: FIB4 SCORE: 1.21

## 2025-06-04 NOTE — DISCHARGE PLANNING
DISCHARGE PLANNING NOTE - TOTAL JOINT    Procedure: RIGHT TOTAL SHOULDER ARTHROPLASTY   Procedure Date: 6/20/2025  Insurance: Payor: [facility] / Plan: UMR - PEBP / Product Type: *No Product type* /    Equipment currently available at home?  Recliner, ice packs  Steps into the home? 2  Steps within the home? 0  Toilet height? Standard  Type of shower? tub-shower w/anti-slip mat  Home Oxygen? No  Portable tank?    Oxygen Provider:  Planning same day discharge: Yes    Is Outpatient Physical Therapy set up after surgery? Yes  Did you take the Total Joint Class and where or did you receive an Educational booklet? Yes, received NAON book.  Who will be your transportation home on day of discharge? Robles- son    Have you made arrangements to have someone stay with you at home for the first 3 days following discharge, and if so, whom? Robles- son and wife    Have you notified your surgeon that you do not have transportation or someone to help you after discharge? N/A    Are you planning on going to a transitional care facility, for example a skilled nursing facility, post operatively for rehab, and if so, have you contacted your insurance plan to see if they cover this? No      Met with the pt. Pt given a copy of Home Safety Checklist, Equipment Resource Guide, CHG scrub kit and instructions. Expected process in Recovery Room and dc criteria discussed with pt. All questions answered and verbalizes understanding of all instructions. No dc needs identified at this time. Anticipate dc to home without barriers. MRSA swab obtained.

## 2025-06-20 ENCOUNTER — APPOINTMENT (OUTPATIENT)
Dept: RADIOLOGY | Facility: MEDICAL CENTER | Age: 60
End: 2025-06-20
Attending: ORTHOPAEDIC SURGERY
Payer: COMMERCIAL

## 2025-06-20 ENCOUNTER — HOSPITAL ENCOUNTER (OUTPATIENT)
Facility: MEDICAL CENTER | Age: 60
End: 2025-06-20
Attending: ORTHOPAEDIC SURGERY | Admitting: ORTHOPAEDIC SURGERY
Payer: COMMERCIAL

## 2025-06-20 ENCOUNTER — ANESTHESIA (OUTPATIENT)
Facility: MEDICAL CENTER | Age: 60
End: 2025-06-20
Payer: COMMERCIAL

## 2025-06-20 ENCOUNTER — ANESTHESIA EVENT (OUTPATIENT)
Facility: MEDICAL CENTER | Age: 60
End: 2025-06-20
Payer: COMMERCIAL

## 2025-06-20 VITALS
WEIGHT: 224.87 LBS | SYSTOLIC BLOOD PRESSURE: 148 MMHG | DIASTOLIC BLOOD PRESSURE: 72 MMHG | OXYGEN SATURATION: 92 % | TEMPERATURE: 97.2 F | BODY MASS INDEX: 34.08 KG/M2 | RESPIRATION RATE: 18 BRPM | HEIGHT: 68 IN | HEART RATE: 88 BPM

## 2025-06-20 PROBLEM — M25.512 CHRONIC LEFT SHOULDER PAIN: Chronic | Status: ACTIVE | Noted: 2019-04-23

## 2025-06-20 PROBLEM — E66.9 OBESITY (BMI 30-39.9): Chronic | Status: ACTIVE | Noted: 2019-06-25

## 2025-06-20 PROBLEM — G89.29 CHRONIC LEFT SHOULDER PAIN: Chronic | Status: ACTIVE | Noted: 2019-04-23

## 2025-06-20 PROCEDURE — 160191 HCHG ANESTHESIA STANDARD: Performed by: ORTHOPAEDIC SURGERY

## 2025-06-20 PROCEDURE — 700111 HCHG RX REV CODE 636 W/ 250 OVERRIDE (IP): Performed by: ANESTHESIOLOGY

## 2025-06-20 PROCEDURE — 160196 HCHG PACU COMPLEX - EA ADDL 30 MINS: Performed by: ORTHOPAEDIC SURGERY

## 2025-06-20 PROCEDURE — 700111 HCHG RX REV CODE 636 W/ 250 OVERRIDE (IP): Mod: JZ | Performed by: ANESTHESIOLOGY

## 2025-06-20 PROCEDURE — 700101 HCHG RX REV CODE 250: Performed by: ORTHOPAEDIC SURGERY

## 2025-06-20 PROCEDURE — 700105 HCHG RX REV CODE 258: Performed by: ANESTHESIOLOGY

## 2025-06-20 PROCEDURE — 700101 HCHG RX REV CODE 250: Performed by: ANESTHESIOLOGY

## 2025-06-20 PROCEDURE — 97166 OT EVAL MOD COMPLEX 45 MIN: CPT

## 2025-06-20 PROCEDURE — C1713 ANCHOR/SCREW BN/BN,TIS/BN: HCPCS | Performed by: ORTHOPAEDIC SURGERY

## 2025-06-20 PROCEDURE — 502240 HCHG MISC OR SUPPLY RC 0272: Performed by: ORTHOPAEDIC SURGERY

## 2025-06-20 PROCEDURE — C1776 JOINT DEVICE (IMPLANTABLE): HCPCS | Performed by: ORTHOPAEDIC SURGERY

## 2025-06-20 PROCEDURE — 700105 HCHG RX REV CODE 258: Performed by: ORTHOPAEDIC SURGERY

## 2025-06-20 PROCEDURE — 160195 HCHG PACU COMPLEX - 1ST 60 MINS: Performed by: ORTHOPAEDIC SURGERY

## 2025-06-20 PROCEDURE — 160046 HCHG PACU - 1ST 60 MINS PHASE II: Performed by: ORTHOPAEDIC SURGERY

## 2025-06-20 PROCEDURE — 160041 HCHG SURGERY MINUTES - EA ADDL 1 MIN LEVEL 4: Performed by: ORTHOPAEDIC SURGERY

## 2025-06-20 PROCEDURE — 160025 RECOVERY II MINUTES (STATS): Performed by: ORTHOPAEDIC SURGERY

## 2025-06-20 PROCEDURE — 160015 HCHG STAT PREOP MINUTES: Performed by: ORTHOPAEDIC SURGERY

## 2025-06-20 PROCEDURE — 73020 X-RAY EXAM OF SHOULDER: CPT | Mod: RT

## 2025-06-20 PROCEDURE — 160047 HCHG PACU  - EA ADDL 30 MINS PHASE II: Performed by: ORTHOPAEDIC SURGERY

## 2025-06-20 PROCEDURE — 502000 HCHG MISC OR IMPLANTS RC 0278: Performed by: ORTHOPAEDIC SURGERY

## 2025-06-20 PROCEDURE — 97535 SELF CARE MNGMENT TRAINING: CPT

## 2025-06-20 PROCEDURE — 160048 HCHG OR STATISTICAL LEVEL 1-5: Performed by: ORTHOPAEDIC SURGERY

## 2025-06-20 PROCEDURE — 64415 NJX AA&/STRD BRCH PLXS IMG: CPT | Performed by: ORTHOPAEDIC SURGERY

## 2025-06-20 PROCEDURE — 160029 HCHG SURGERY MINUTES - 1ST 30 MINS LEVEL 4: Performed by: ORTHOPAEDIC SURGERY

## 2025-06-20 PROCEDURE — 700111 HCHG RX REV CODE 636 W/ 250 OVERRIDE (IP)

## 2025-06-20 PROCEDURE — 160002 HCHG RECOVERY MINUTES (STAT): Performed by: ORTHOPAEDIC SURGERY

## 2025-06-20 DEVICE — CEMENT BONE SIMPLEX SPEEDSET FULL DOSE (10EA/BX): Type: IMPLANTABLE DEVICE | Status: FUNCTIONAL

## 2025-06-20 RX ORDER — ONDANSETRON 2 MG/ML
4 INJECTION INTRAMUSCULAR; INTRAVENOUS
Status: COMPLETED | OUTPATIENT
Start: 2025-06-20 | End: 2025-06-20

## 2025-06-20 RX ORDER — OXYCODONE HCL 5 MG/5 ML
5 SOLUTION, ORAL ORAL
Status: DISCONTINUED | OUTPATIENT
Start: 2025-06-20 | End: 2025-06-20 | Stop reason: HOSPADM

## 2025-06-20 RX ORDER — SODIUM CHLORIDE, SODIUM LACTATE, POTASSIUM CHLORIDE, CALCIUM CHLORIDE 600; 310; 30; 20 MG/100ML; MG/100ML; MG/100ML; MG/100ML
INJECTION, SOLUTION INTRAVENOUS CONTINUOUS
Status: ACTIVE | OUTPATIENT
Start: 2025-06-20 | End: 2025-06-20

## 2025-06-20 RX ORDER — DIPHENHYDRAMINE HYDROCHLORIDE 50 MG/ML
12.5 INJECTION, SOLUTION INTRAMUSCULAR; INTRAVENOUS
Status: DISCONTINUED | OUTPATIENT
Start: 2025-06-20 | End: 2025-06-20 | Stop reason: HOSPADM

## 2025-06-20 RX ORDER — LIDOCAINE HYDROCHLORIDE 20 MG/ML
INJECTION, SOLUTION EPIDURAL; INFILTRATION; INTRACAUDAL; PERINEURAL PRN
Status: DISCONTINUED | OUTPATIENT
Start: 2025-06-20 | End: 2025-06-20 | Stop reason: SURG

## 2025-06-20 RX ORDER — HYDROMORPHONE HYDROCHLORIDE 1 MG/ML
0.2 INJECTION, SOLUTION INTRAMUSCULAR; INTRAVENOUS; SUBCUTANEOUS
Status: DISCONTINUED | OUTPATIENT
Start: 2025-06-20 | End: 2025-06-20 | Stop reason: HOSPADM

## 2025-06-20 RX ORDER — HYDROMORPHONE HYDROCHLORIDE 1 MG/ML
0.4 INJECTION, SOLUTION INTRAMUSCULAR; INTRAVENOUS; SUBCUTANEOUS
Status: DISCONTINUED | OUTPATIENT
Start: 2025-06-20 | End: 2025-06-20 | Stop reason: HOSPADM

## 2025-06-20 RX ORDER — EPHEDRINE SULFATE 50 MG/ML
INJECTION, SOLUTION INTRAVENOUS PRN
Status: DISCONTINUED | OUTPATIENT
Start: 2025-06-20 | End: 2025-06-20 | Stop reason: SURG

## 2025-06-20 RX ORDER — HYDROMORPHONE HYDROCHLORIDE 1 MG/ML
0.1 INJECTION, SOLUTION INTRAMUSCULAR; INTRAVENOUS; SUBCUTANEOUS
Status: DISCONTINUED | OUTPATIENT
Start: 2025-06-20 | End: 2025-06-20 | Stop reason: HOSPADM

## 2025-06-20 RX ORDER — ONDANSETRON 2 MG/ML
INJECTION INTRAMUSCULAR; INTRAVENOUS PRN
Status: DISCONTINUED | OUTPATIENT
Start: 2025-06-20 | End: 2025-06-20 | Stop reason: SURG

## 2025-06-20 RX ORDER — DEXAMETHASONE SODIUM PHOSPHATE 4 MG/ML
INJECTION, SOLUTION INTRA-ARTICULAR; INTRALESIONAL; INTRAMUSCULAR; INTRAVENOUS; SOFT TISSUE PRN
Status: DISCONTINUED | OUTPATIENT
Start: 2025-06-20 | End: 2025-06-20 | Stop reason: SURG

## 2025-06-20 RX ORDER — OXYCODONE HCL 5 MG/5 ML
10 SOLUTION, ORAL ORAL
Status: DISCONTINUED | OUTPATIENT
Start: 2025-06-20 | End: 2025-06-20 | Stop reason: HOSPADM

## 2025-06-20 RX ORDER — SODIUM CHLORIDE, SODIUM LACTATE, POTASSIUM CHLORIDE, CALCIUM CHLORIDE 600; 310; 30; 20 MG/100ML; MG/100ML; MG/100ML; MG/100ML
INJECTION, SOLUTION INTRAVENOUS
Status: DISCONTINUED | OUTPATIENT
Start: 2025-06-20 | End: 2025-06-20 | Stop reason: SURG

## 2025-06-20 RX ORDER — ROCURONIUM BROMIDE 10 MG/ML
INJECTION, SOLUTION INTRAVENOUS PRN
Status: DISCONTINUED | OUTPATIENT
Start: 2025-06-20 | End: 2025-06-20 | Stop reason: SURG

## 2025-06-20 RX ORDER — CEFAZOLIN SODIUM 1 G/3ML
INJECTION, POWDER, FOR SOLUTION INTRAMUSCULAR; INTRAVENOUS PRN
Status: DISCONTINUED | OUTPATIENT
Start: 2025-06-20 | End: 2025-06-20 | Stop reason: SURG

## 2025-06-20 RX ORDER — VANCOMYCIN HCL 900 MCG/MG
POWDER (GRAM) MISCELLANEOUS
Status: DISCONTINUED | OUTPATIENT
Start: 2025-06-20 | End: 2025-06-20 | Stop reason: HOSPADM

## 2025-06-20 RX ORDER — BUPIVACAINE HYDROCHLORIDE 5 MG/ML
INJECTION, SOLUTION EPIDURAL; INTRACAUDAL; PERINEURAL PRN
Status: DISCONTINUED | OUTPATIENT
Start: 2025-06-20 | End: 2025-06-20 | Stop reason: SURG

## 2025-06-20 RX ORDER — HALOPERIDOL 5 MG/ML
1 INJECTION INTRAMUSCULAR
Status: DISCONTINUED | OUTPATIENT
Start: 2025-06-20 | End: 2025-06-20 | Stop reason: HOSPADM

## 2025-06-20 RX ORDER — CEFAZOLIN SODIUM 1 G/3ML
2 INJECTION, POWDER, FOR SOLUTION INTRAMUSCULAR; INTRAVENOUS ONCE
Status: DISCONTINUED | OUTPATIENT
Start: 2025-06-20 | End: 2025-06-20 | Stop reason: HOSPADM

## 2025-06-20 RX ORDER — TRANEXAMIC ACID 100 MG/ML
INJECTION, SOLUTION INTRAVENOUS PRN
Status: DISCONTINUED | OUTPATIENT
Start: 2025-06-20 | End: 2025-06-20 | Stop reason: SURG

## 2025-06-20 RX ORDER — MEPERIDINE HYDROCHLORIDE 25 MG/ML
12.5 INJECTION INTRAMUSCULAR; INTRAVENOUS; SUBCUTANEOUS
Status: DISCONTINUED | OUTPATIENT
Start: 2025-06-20 | End: 2025-06-20 | Stop reason: HOSPADM

## 2025-06-20 RX ORDER — SODIUM CHLORIDE, SODIUM LACTATE, POTASSIUM CHLORIDE, CALCIUM CHLORIDE 600; 310; 30; 20 MG/100ML; MG/100ML; MG/100ML; MG/100ML
INJECTION, SOLUTION INTRAVENOUS CONTINUOUS
Status: DISCONTINUED | OUTPATIENT
Start: 2025-06-20 | End: 2025-06-20 | Stop reason: HOSPADM

## 2025-06-20 RX ADMIN — LIDOCAINE HYDROCHLORIDE 50 MG: 20 INJECTION, SOLUTION EPIDURAL; INFILTRATION; INTRACAUDAL; PERINEURAL at 08:03

## 2025-06-20 RX ADMIN — ROCURONIUM BROMIDE 50 MG: 10 INJECTION INTRAVENOUS at 08:04

## 2025-06-20 RX ADMIN — EPHEDRINE SULFATE 15 MG: 50 INJECTION, SOLUTION INTRAVENOUS at 08:40

## 2025-06-20 RX ADMIN — HALOPERIDOL LACTATE 1 MG: 5 INJECTION, SOLUTION INTRAMUSCULAR at 10:43

## 2025-06-20 RX ADMIN — SODIUM CHLORIDE, POTASSIUM CHLORIDE, SODIUM LACTATE AND CALCIUM CHLORIDE: 600; 310; 30; 20 INJECTION, SOLUTION INTRAVENOUS at 06:58

## 2025-06-20 RX ADMIN — SUGAMMADEX 200 MG: 100 INJECTION, SOLUTION INTRAVENOUS at 09:35

## 2025-06-20 RX ADMIN — TRANEXAMIC ACID 1000 MG: 100 INJECTION, SOLUTION INTRAVENOUS at 08:03

## 2025-06-20 RX ADMIN — TRANEXAMIC ACID 1000 MG: 100 INJECTION, SOLUTION INTRAVENOUS at 09:35

## 2025-06-20 RX ADMIN — PROPOFOL 200 MG: 10 INJECTION, EMULSION INTRAVENOUS at 08:03

## 2025-06-20 RX ADMIN — EPHEDRINE SULFATE 10 MG: 50 INJECTION, SOLUTION INTRAVENOUS at 09:29

## 2025-06-20 RX ADMIN — BUPIVACAINE HYDROCHLORIDE 10 ML: 5 INJECTION, SOLUTION EPIDURAL; INTRACAUDAL at 07:48

## 2025-06-20 RX ADMIN — CEFAZOLIN 2 G: 1 INJECTION, POWDER, FOR SOLUTION INTRAMUSCULAR; INTRAVENOUS at 08:02

## 2025-06-20 RX ADMIN — ONDANSETRON 4 MG: 2 INJECTION INTRAMUSCULAR; INTRAVENOUS at 09:35

## 2025-06-20 RX ADMIN — SODIUM CHLORIDE, POTASSIUM CHLORIDE, SODIUM LACTATE AND CALCIUM CHLORIDE: 600; 310; 30; 20 INJECTION, SOLUTION INTRAVENOUS at 08:00

## 2025-06-20 RX ADMIN — BUPIVACAINE 10 ML: 13.3 INJECTION, SUSPENSION, LIPOSOMAL INFILTRATION at 07:48

## 2025-06-20 RX ADMIN — DEXAMETHASONE SODIUM PHOSPHATE 8 MG: 4 INJECTION INTRA-ARTICULAR; INTRALESIONAL; INTRAMUSCULAR; INTRAVENOUS; SOFT TISSUE at 08:03

## 2025-06-20 RX ADMIN — ONDANSETRON 4 MG: 2 INJECTION INTRAMUSCULAR; INTRAVENOUS at 10:27

## 2025-06-20 ASSESSMENT — COGNITIVE AND FUNCTIONAL STATUS - GENERAL
DAILY ACTIVITIY SCORE: 20
DRESSING REGULAR UPPER BODY CLOTHING: A LOT
SUGGESTED CMS G CODE MODIFIER DAILY ACTIVITY: CJ
DRESSING REGULAR LOWER BODY CLOTHING: A LITTLE
HELP NEEDED FOR BATHING: A LITTLE

## 2025-06-20 ASSESSMENT — PAIN DESCRIPTION - PAIN TYPE
TYPE: SURGICAL PAIN
TYPE: CHRONIC PAIN

## 2025-06-20 ASSESSMENT — GAIT ASSESSMENTS: DISTANCE (FEET): 50

## 2025-06-20 ASSESSMENT — FIBROSIS 4 INDEX: FIB4 SCORE: 1.23

## 2025-06-20 ASSESSMENT — ACTIVITIES OF DAILY LIVING (ADL): TOILETING: INDEPENDENT

## 2025-06-20 NOTE — THERAPY
Occupational Therapy   Initial Evaluation     Patient Name:  Lawrence Garrett  Age:  60 y.o., Sex:  male  Medical Record #:  [id number]  Today's Date:  6/20/2025     Precautions  Orthopedic: (P) Shoulder Immobilizer Right Upper Extremity  Weight Bearing: (P) Non Weight Bearing Right Upper Extremity    Assessment  Patient is 60 y.o. male s/p R TSA, POD 0. Additional factors influencing patient status / progress: Pt active prior. Lives with spouse who is currently out of town. Son will be with pt upon dc. Immobilizer in pace to RUE; pt is R hand dominant. A&Ox4, pleasant and cooperative. Nerve block in effect to RUE; pendulums demonstrated and handout issued. Pt performs FB dressing, toileting, ambulation; good balance. See below for CLOF.       Pt and son educated in detail on ADL's after R Total Shoulder surgery.   Summary of education completed:  How to adjust sling/immobilizer for best fit.  To keep sling positioned so hand is level or slightly above elbow to reduce pull of gravity on arm and maintain shoulder in neutral positioning.  How to don & doff sling/immobilizer safely and appropriately for dressing and bathing.  How to dress upper body while maintaining post surgical precautions.  How to shower safely.  How to appropriately cover incision for showering if needed prior to removal of staples.    Proper positioning of arm during showering.   Encouraged use of hand held shower (using the non-operative extremity) to keep water away from the incision site.    Safe shower and toilet transfers.  Proper positioning while sleeping either in bed or recliner  Encouraged patient to support arm with pillows under forearm when sitting to reduce strain on the neck from the weight of the arm and immobilizer.   Proper bed mobility and transfer techniques while maintaining NWB on surgical arm.  Proper positioning of arm for gentle wrist/hand ROM and passive elbow extension.    Pain management education - around the clock. Pt  and son verbalized and demonstrated understanding of education provided. Pt is DC ready from OT.  Plan  1x only OT Eval.  DC Equipment Recommendations: (P) Tub / Shower Seat, Hand Held Shower  Discharge Recommendations: (P) Anticipate that the patient will have no further occupational therapy needs after discharge from the hospital   Subjective  Agreeable  Objective   06/20/25 1210   Prior Living Situation   Prior Services Home-Independent   Housing / Facility 1 Story House   Steps Into Home 2   Steps In Home 0   Bathroom Set up Bathtub / Shower Combination;Shower Glass Doors;Grab Bars   Equipment Owned Grab Bar(s) In Tub / Shower   Lives with - Patient's Self Care Capacity Spouse   Comments Spouse is currently out of town; son will stay with pt upon dc.   Prior Level of ADL Function   Self Feeding Independent   Grooming / Hygiene Independent   Bathing Independent   Dressing Independent   Toileting Independent   Prior Level of IADL Function   Medication Management Independent   Laundry Independent   Kitchen Mobility Independent   Finances Independent   Home Management Independent   Shopping Independent   Prior Level Of Mobility Independent Without Device in Home   Driving / Transportation Driving Independent   Occupation (Pre-Hospital Vocational) Employed Full Time   Leisure Interests Hobbies;Television;Family   History of Falls   History of Falls No   Precautions   Orthopedic Shoulder Immobilizer Right Upper Extremity   Weight Bearing Non Weight Bearing Right Upper Extremity   Vitals   O2 Delivery Device None - Room Air   Pain 0 - 10 Group   Location Shoulder   Location Orientation Right   Therapist Pain Assessment Post Activity Pain Same as Prior to Activity;Nurse Notified   Cognition    Cognition / Consciousness WDL   Level of Consciousness Alert   Comments Ox4   Passive ROM Upper Body   Comments eulalia demo'd; nerve block in effect   Active ROM Upper Body   Active ROM Upper Body  X   Dominant Hand Right;Using  Non-Dominant Hand   Comments RUE in brace. Pt able to perfrom rom to R hand/wrist   Strength Upper Body   Upper Body Strength  Not Tested   Sensation Upper Body   Upper Extremity Sensation  X   Comments due to nerve block   Upper Body Muscle Tone   Upper Body Muscle Tone  X   Coordination Upper Body   Coordination X   Balance Assessment   Sitting Balance (Static) Good   Sitting Balance (Dynamic) Good   Standing Balance (Static) Good   Standing Balance (Dynamic) Fair +   Weight Shift Sitting Good   Weight Shift Standing Good   ADL Assessment   Upper Body Dressing Moderate Assist   Lower Body Dressing Standby Assist   Toileting Supervision   How much help from another person does the patient currently need...   Putting on and taking off regular lower body clothing? 3   Bathing (including washing, rinsing, and drying)? 3   Toileting, which includes using a toilet, bedpan, or urinal? 4   Putting on and taking off regular upper body clothing? 2   Taking care of personal grooming such as brushing teeth? 4   Eating meals? 4   6 Clicks Daily Activity Score 20   Functional Mobility   Sit to Stand Modified Independent   Bed, Chair, Wheelchair Transfer Supervised   Toilet Transfers Supervised   Distance (Feet) 50   # of Times Distance was Traveled 2   Visual Perception   Visual Perception  WDL   Activity Tolerance   Sitting in Chair >30   Standing 6   Education Group   Education Provided Upper Extremity Range of Motion;Brace Wear and Care;Home Safety;Activities of Daily Living   Role of Occupational Therapist Patient Response Patient;Family;Acceptance;Explanation;Verbal Demonstration   Upper Ext ROM Patient Response Patient;Family;Acceptance;Explanation;Demonstration;Handout;Verbal Demonstration   Brace Wear & Care Patient Response Patient;Family;Acceptance;Explanation;Handout;Verbal Demonstration   Home Safety Patient Response Patient;Family;Acceptance;Explanation;Verbal Demonstration   ADL Patient Response  Patient;Family;Acceptance;Explanation;Verbal Demonstration;Action Demonstration   Additional Comments son states he will obtain the recommended tub seat and install HH shower   Anticipated Discharge Equipment and Recommendations   DC Equipment Recommendations Tub / Shower Seat;Hand Held Shower   Discharge Recommendations Anticipate that the patient will have no further occupational therapy needs after discharge from the hospital   Interdisciplinary Plan of Care Collaboration   IDT Collaboration with  Family / Caregiver;Nursing   Patient Position at End of Therapy Seated;Family / Friend in Room;Tray Table within Reach;Phone within Reach   Collaboration Comments OT Eval/Education.

## 2025-06-20 NOTE — OR NURSING
"0955 Patient arrived to PACU from OR via [facility] VSS on 8L mask, resp even and unlabored, OPA in place. Patient drowsy. ID band verified. Patient bp low on arrival - per Dr Gansert, as long as systolic is >70 and map is close to 60, no interventions warranted. No meds ordered. Patient placed in trendelenburg position.     1022 OPA removed.     1024 XR at bedside    1025 Took a few sips of water, tolerating po well. Offered juice/soda.    1035 Notified Dr Gansert of new slur - patient stating he is \"just waking up\". Per pre op RN, he did not speak like this before surgery. RUE noted n/t 2/2 block, but ble equal strength and sensation. Noted nystagmus to bilateral eyes. Equal symmetry with facial movement.    1030 Pulling 1500 volume on incentive spirometer, with goal volume of 2780. Good effort.    1045 Called Robles, support person, with update.    1049 Dr Gansert bedside - not concerned about slur/nystagmus.     1100 Meets criteria to transfer to stage 2, VSS.  Good condition for dc.     "

## 2025-06-20 NOTE — ANESTHESIA PROCEDURE NOTES
Airway    Date/Time: 6/20/2025 8:05 AM    Performed by: Tobey Gansert, M.D.  Authorized by: Tobey Gansert, M.D.    Location:  OR  Urgency:  Elective  Indications for Airway Management:  Anesthesia      Spontaneous Ventilation: absent    Sedation Level:  Deep  Preoxygenated: Yes    Patient Position:  Sniffing  Final Airway Type:  Endotracheal airway  Final Endotracheal Airway:  ETT  Cuffed: Yes    Technique Used for Successful ETT Placement:  Direct laryngoscopy    Insertion Site:  Oral  Blade Type:  Evelio  Laryngoscope Blade/Videolaryngoscope Blade Size:  3  ETT Size (mm):  7.5  Measured from:  Teeth  ETT to Teeth (cm):  23  Placement Verified by: auscultation and capnometry    Cormack-Lehane Classification:  Grade I - full view of glottis  Number of Attempts at Approach:  1

## 2025-06-20 NOTE — OR SURGEON
Immediate Post OP Note    PreOp Diagnosis: RIGHT shoulder OA      PostOp Diagnosis: SAME      Procedure(s):  RIGHT TOTAL SHOULDER ARTHROPLASTY - Wound Class: Clean    Surgeon(s):  Dodie Huang M.D.    Anesthesiologist/Type of Anesthesia:  Anesthesiologist: Tobey Gansert, M.D./General    Surgical Staff:  Circulator: Barrington Staley R.N.  Limb Macario: Lexie Mccarthy Circulator: Michelle Lancaster R.N.  Relief Scrub: Alejandro Pennington  Scrub Person: Leilani Cárdenas Assist: Del Tapia    Specimens removed if any:  * No specimens in log *    Estimated Blood Loss: 100 cc    Findings: as above    Complications: none    SI        6/20/2025 10:03 AM Dodie Huang M.D.

## 2025-06-20 NOTE — OP REPORT
DATE OF SERVICE:  06/20/2025     PREOPERATIVE DIAGNOSIS:  Severe glenohumeral joint arthritis, right shoulder.     POSTOPERATIVE DIAGNOSIS:  Severe glenohumeral joint arthritis, right shoulder.     PROCEDURE:  Right primary total shoulder arthroplasty.     SURGEON:  Dodie Huang MD     ASSISTANT:  FLORIDA German     ANESTHESIOLOGIST:  Tobey Gansert, MD     TYPE OF ANESTHESIA:  General, with preoperative interscalene nerve block using   Exparel.     INTRAVENOUS FLUID:  1 liter crystalloid.     ESTIMATED BLOOD LOSS:  100 mL.     DRAINS:  None.     SPECIMENS:  None.     COMPLICATIONS:  None.     IMPLANTS:  Shoulder Innovations 5-degree inset plus glenoid component cemented   with single batch of Simplex cement, size 0 stemless humeral implant with 46  x 18 mm humeral head.     REASON FOR PROCEDURE:  The patient is a 60-year-old male who underwent a prior   right shoulder arthroscopy with proximal biceps tenodesis with me several   years ago.  He did well, but his arthritis progressed, with both plain x-ray   as well as CT Provoyance scan findings reviewed.  He failed to definitively respond   to nonoperative measures and thus we decided to proceed with arthroplasty.     OPERATION:  The patient was given a right interscalene nerve block using   Exparel by the anesthesiologist before surgery.  Once back in the operating   room, a breathing tube was placed.  He was given 2 grams of IV Ancef as well   as 1 gram of tranexamic acid.  He was placed in the beach chair position.  The   right upper extremity was prepped with ChloraPrep and draped in standard   sterile fashion.  It was then placed in the spider articulating arm holcomb.  A   deltopectoral interval incision was then made using the PlasmaBlade.  The   cephalic vein was identified and retracted laterally.  A Bankart retractor was   placed.  The biceps tendon was not encountered due to previous tenodesis.    The rotator cuff was intact.  Using a peel-off  technique, the upper corner of   the subscapularis tendon was tagged and with progressive external rotation,   access was gained to the arthritic glenohumeral joint.  There were minimal   peripheral osteophytes, which were removed with a rongeur.  An extramedullary   cutting guide was then used.  The humeral neck cut was made.  Bone quality was   noted to be quite good.  The proximal humerus was then prepared using a   broach, with the trial then left in place followed by a cut protector.  There   were some subdeltoid and subacromial bursal adhesions and using a finger sweep   and a Ram elevator, these were broken free.  Retractors were then placed   circumferentially around the glenoid.  What remained of the labrum was   removed.  A caliper was then used, referencing the patient's preoperative CT   ____ program, to maynor the central hole.  The 5-degree guide was then placed at   the predetermined position, and the pin was driven through the far cortex.    The 25-mm reamer was then used to create the pocket.  The central peg was then   drilled.  The patient's preoperative CT blueprint was referenced, and the   guide was held at the appropriate angle to allow for the 5-degree inset to be   at the 11:30 position as planned.  Drill holes were then placed   anterosuperiorly as well as posteroinferiorly.  The guide pin was removed and   the pegs were evaluated with the trial noting to have appropriate depth.  The   wound was then irrigated with dilute Betadine, IrriSept, as well as sterile   saline.  A single batch of cement was mixed and placed into a syringe.    Thrombin-soaked Gelfoam was used to fill all holes.  The Gelfoam was removed   and all holes were filled with cement.  Some cement was placed on the backside   of the component, which was impacted and held with direct pressure until the   cement had thoroughly cured.  Excess cement was removed from around the   periphery of the implant.  Attention was then  turned back to the proximal   humerus.  This was done after direct thumb pressure had been held until the   cement had thoroughly cured.  A trial humeral head was used, with a 46 chosen.    The head was dialed into the appropriate position.  This allowed for   appropriate offset as to not put undue pressure on the subscapularis tendon.    The shoulder was reduced.  This was determined to be the appropriate sizing   for the humeral implants.  The humeral head and stem were then removed.  The   lesser tuberosity was roughened and then three drill holes were placed through   the lesser tuberosity with XBraid suture placed through each one.  After   further irrigation of the humeral canal, a size 0 stemless implant was   impacted partway down followed by the humeral head, engaging ____ and allowing   for impaction of the humeral head on the stem.  An excellent overall fit was   noted.  The shoulder was reduced.  The subscapularis tendon was then closed   with multiple modified Misael-Alpesh sutures and then oversewn with an   additional XBraid.  The biceps tendon had been previously tenodesed with a   different procedure.  The rotator interval was closed with XBraid suture.  One   gram of vancomycin powder was placed in the deep and superficial soft   tissues.  The deltopectoral interval was closed with 0 Vicryl.  A running 2-0   Monocryl was used followed by a running 3-0 Monocryl in subcutaneous and   subcuticular layers.  Mastisol and Steri-Strips were then placed over the skin   incision followed by a sterile Aquacel dressing.  All drapes were then   removed and the arm was carefully taken out of traction and placed into a   shoulder abduction sling.  The patient was placed supine on a stretcher and   taken to the recovery room, in stable condition.        ______________________________  MD EDUARDO MONTIEL/SONYA    DD:  06/20/2025 10:10  DT:  06/20/2025 11:26    Job#:  [id number]

## 2025-06-20 NOTE — ANESTHESIA POSTPROCEDURE EVALUATION
Patient: Lawrence Garrett    Procedure Summary       Date: 06/20/25 Room / Location: [place] [address] / SURGERY DAY SURGERY [facility]    Anesthesia Start: 0800 Anesthesia Stop: 0951    Procedure: RIGHT TOTAL SHOULDER ARTHROPLASTY (Right: Shoulder) Diagnosis: (OSTEOARTHRITIS OF RIGHT GLENOHUMERAL JOINT)    Surgeons: Dodie Huang M.D. Responsible Provider: Tobey Gansert, M.D.    Anesthesia Type: general, peripheral nerve block ASA Status: 2            Final Anesthesia Type: general, peripheral nerve block  Last vitals  BP   Blood Pressure: 128/75    Temp   36 °C (96.8 °F)    Pulse   83   Resp   20    SpO2   91 %      Anesthesia Post Evaluation    Patient location during evaluation: PACU  Patient participation: complete - patient participated  Level of consciousness: awake and alert    Airway patency: patent  Anesthetic complications: no  Cardiovascular status: hemodynamically stable  Respiratory status: acceptable  Hydration status: euvolemic    PONV: none          There were no known notable events for this encounter.     Nurse Pain Score: 0 (NPRS)

## 2025-06-20 NOTE — OR NURSING
0557- Pt brought back to pre- op holding. Assumed care.     0643- Patient allergies and NPO status verified, home medication reconciliation completed and belongings secures. Patient verbalizes understanding of pain scale, expected course of stay and plan of care. Surgical site verified with patient. IV access established. SCD'S and José Manuel's in place.

## 2025-06-20 NOTE — ANESTHESIA PROCEDURE NOTES
Peripheral Block    Date/Time: 6/20/2025 7:45 AM    Performed by: Tobey Gansert, M.D.  Authorized by: Tobey Gansert, M.D.    Patient Location:  Pre-op  Start Time:  6/20/2025 7:45 AM  End Time:  6/20/2025 7:52 AM  Reason for Block: at surgeon's request and post-op pain management ONLY    patient identified, IV checked, site marked, risks and benefits discussed, surgical consent, monitors and equipment checked, pre-op evaluation and timeout performed    Patient Position:  Supine  Prep: ChloraPrep    Monitoring:  Heart rate, continuous pulse ox and cardiac monitor  Block Region:  Upper Extremity  Upper Extremity - Block Type:  BRACHIAL PLEXUS block, Interscalene approach    Laterality:  Right  Procedures: ultrasound guided  Image captured, interpreted and electronically stored.  Local Infiltration:  Lidocaine  Strength:  1 %  Dose:  3 ml  Block Type:  Single-shot  Needle Length:  50mm  Needle Gauge:  22 G  Needle Localization:  Ultrasound guidance  Ultrasound picture in chart  Injection Assessment:  Negative aspiration for heme, no paresthesia on injection, incremental injection and local visualized surrounding nerve on ultrasound  Evidence of intravascular injection: No     US Guided Interscalene Brachial Plexus Block   US transducer placed on the neck in oblique plane approximately at the level of C6.  Anterior and Middle Scalene (MSM) muscles identified with nerve trunks identified between the muscles.  Needle inserted lateral to probe and advanced under direct visualization through the MSM into a perineural position.  After negative aspiration, LA injected with ease and visualized surrounding the nerve trunks.

## 2025-06-20 NOTE — ANESTHESIA TIME REPORT
Anesthesia Start and Stop Event Times       Date Time Event    6/20/2025 0730 Ready for Procedure     0800 Anesthesia Start     0951 Anesthesia Stop          Responsible Staff  06/20/25      Name Role Begin End    Tobey Gansert, M.D. Anesth 0800 0951          Overtime Reason:  no overtime (within assigned shift)    Comments:

## 2025-06-20 NOTE — DISCHARGE INSTRUCTIONS
Dodie Huang MD     Office telephone:  [phone]     Shoulder Total Joint Replacement   Post-Operative Instructions       Nerve Block:  The effects of the nerve block may last from 12-60 hours depending on the medication used.   It is recommended to sleep in a semi upright position for the first few days as the nerve block may cause shortness of breath when lying flat.        Dressings: Keep your dressing clean after surgery.  A waterproof adherent dressing will be placed over your incision at the conclusion of your surgery.  Change your dressing one week following surgery with the extra dressing provided.  Please inspect your incision for any surrounding redness or drainage when you change the dressing.  If necessary, you may clean the edges of the wound with soap & water.  Dry the area with a clean cloth.  Then, place a new dressing over your incision.  If the dressing becomes loose or saturated with blood or fluid, it may be replaced at any time. Please keep your incision covered until your first post-operative appointment. At your first post-op appointment, your dressing will be removed.   In most cases, the surgical incision will not need to be covered after two weeks.     Baths/Pools/Hot Tubs.  Please do not submerge your incision in a hot tub, pool, or bath until at least six weeks after surgery.  Make sure that your incision is healed with no remaining scab or drainage before submerging in water.     Compression Hose/MICH hose: Compression hose/MICH hose will be placed on your legs prior to surgery at the hospital.  Please keep these on for at least two days, as they help control leg swelling as well as reduce the risk of a blood clot.   You may remove the compression hose temporarily to shower.     Ice: Use ice or cold therapy to your shoulder as you feel necessary to help with the pain and swelling.  Typically, this is 20 minutes on and 20 minutes off for the first several days following surgery.   Cold therapy units can be used as directed.        Sling:  Please wear your sling when walking about and when sleeping.  You may carefully remove the sling when awake and seated.  Please support your forearm on a pillow when the sling is removed. You may remove the pillow portion of the sling after two weeks. If you have questions regarding how to wear sling or need a replacement, please contact [facility] @ [phone].     Activity:  You may remove your sling when awake and seated.  Please support your forearm on a pillow.  Once your sling is removed, you may move your elbow, wrist and hand as tolerated. Please do not lift anything heavier than a cup of coffee on your operative side.  Codman/pendulum exercises are encouraged 3-5 times a day. You will need to remove the sling to perform these exercises.     Pain Medication:  Take your pain medication as prescribed, only as needed.  Do not drive or operate machinery while taking narcotic pain medication.  You may resume anti-inflammatory medications any time after surgery. Please take medication with food to avoid stomach upset.     Aspirin:  Please take Aspirin 81 mg twice daily starting the morning after surgery and continue for 2 weeks, to help prevent a blood clot.     Driving:  Please arrange a ride to and from the hospital/surgery center on the day of surgery.   You may drive as soon as you are off pain medication and feel comfortable behind the wheel.     Physical Therapy:  Contact a physical therapy facility approved by your insurance plan to schedule your initial visits.  Typically, physical therapy is scheduled twice weekly to begin at approximately two weeks after surgery for primary total shoulder patients and six weeks after surgery for reverse total shoulder patients.     Follow-up:  Your first post-op appointment should be scheduled 10-14 days after surgery.  The details of your procedure and specifics of rehabilitation will be discussed.      Problems/concerns: If you have a problem or significant concern (unexpected pain, excessive bleeding or discharge from your incisions, fever or chills) or do not hesitate to call the office @ [phone] or go to your local emergency room.       Dodie Huang MD     [facility]   [address], [address]   [place], [place] [address]     Office telephone:  [phone]   Office fax: [phone]       If any questions arise, call your provider.  If your provider is not available, please feel free to call the Surgical Center at [phone].    MEDICATIONS: Resume taking daily medication.  Take prescribed pain medication with food.  If no medication is prescribed, you may take non-aspirin pain medication if needed.  PAIN MEDICATION CAN BE VERY CONSTIPATING.  Take a stool softener or laxative such as senokot, pericolace, or milk of magnesia if needed.    NO PAIN MEDS GIVEN IN PACU.     What to Expect Post Anesthesia    Rest and take it easy for the first 24 hours.  A responsible adult is recommended to remain with you during that time.  It is normal to feel sleepy.  We encourage you to not do anything that requires balance, judgment or coordination.    FOR 24 HOURS DO NOT:  Drive, operate machinery or run household appliances.  Drink beer or alcoholic beverages.  Make important decisions or sign legal documents.    To avoid nausea, slowly advance diet as tolerated, avoiding spicy or greasy foods for the first day.  Add more substantial food to your diet according to your provider's instructions.  INCREASE FLUIDS AND FIBER TO AVOID CONSTIPATION.    MILD FLU-LIKE SYMPTOMS ARE NORMAL.  YOU MAY EXPERIENCE GENERALIZED MUSCLE ACHES, THROAT IRRITATION, HEADACHE AND/OR SOME NAUSEA.      Upper Extremity Peripheral Nerve Block Discharge Instructions    What to Expect - Upper Extremity  You may experience numbness and weakness in shoulder  on the same side as your surgery  This is normal. For some  "people, this may be an unpleasant sensation. Be very careful with your numb limb  Ask for help when you need it    Prevent Injury  Protect the limb like a baby  Beware of exposing your limb to extreme heat or cold or trauma  The limb may be injured without you noticing because it is numb  Keep the limb elevated whenever possible  Do not sleep on the limb  Change the position of the limb regularly  Avoid putting pressure on your surgical limb    Pain Control  The initial block on the day of surgery will make your extremity feel \"numb\"  Any consecutive injection including prior to discharge from the hospital will make your extremity feel \"numb\"  You may feel an aching or burning when the local anesthesia starts to wear off  Take pain pills as prescribed by your surgeon  Call your surgeon or anesthesiologist if you do not have adequate pain control        IMPORTANT NOTE IF YOU RECEIVED EXPAREL:    The liposomal bupivacaine (Exparel) teal arm band is used as a visual queue to alert healthcare professionals that you received a long-acting form of bupivicaine during your recent surgery. You should not receieve additional local anesthetics (i.e. bupivicaine, lidocaine) for 96 hours after surgery. There may be situations where you are unable to communicate this information, so it is important for your safety to keep the teal arm band on for 96 hours (4 days ) after surgery.     "

## 2025-06-20 NOTE — ANESTHESIA PREPROCEDURE EVALUATION
Case: [id number] Date/Time: 06/20/25 0745    Procedure: RIGHT TOTAL SHOULDER ARTHROPLASTY    Pre-op diagnosis: OSTEOARTHRITIS OF RIGHT GLENOHUMERAL JOINT    Location: [facility] OR 10 / SURGERY DAY SURGERY [facility]    Surgeons: Dodie Hunag M.D.            Relevant Problems   ANESTHESIA   (positive) Sea sickness      CARDIAC   (positive) Essential hypertension      Other   (positive) Anxiety   (positive) Chronic right shoulder pain       Physical Exam    Airway   Mallampati: II  TM distance: >3 FB  Neck ROM: full       Cardiovascular - normal exam  Rhythm: regular  Rate: normal    (-) murmur     Dental - normal exam           Pulmonary - normal examBreath sounds clear to auscultation     Abdominal    Neurological - normal exam                   Anesthesia Plan    ASA 2       Plan - general and peripheral nerve block     Peripheral nerve block will be post-op pain control  Airway plan will be ETT          Induction: intravenous    Postoperative Plan: Postoperative administration of opioids is intended.    Pertinent diagnostic labs and testing reviewed    Informed Consent:    Anesthetic plan and risks discussed with patient.    Use of blood products discussed with: patient whom consented to blood products.

## 2025-06-20 NOTE — OR NURSING
1147 Assumed care of patient; pt sitting up in recliner with OT and states that pain level is tolerable and denies nausea. Sling in place to RUE with extra padding to Rt elbow. Pink/warm fingers to RUE with +2 pedal pulse with fresh ice pack placed over CDI dressing to R shoulder.   1200 Up walking in hallway with OT and denies dizziness or any problems.   1215 Pt sitting up in recliner at rest, sipping coffee. Pulse ox 92-93% on RA.

## 2025-06-20 NOTE — OR NURSING
1110- Report received from Jaqueline FIGUEROA  Pt settled in to recliner following short ambulation from [facility]. Pt pain 0/10. Pt denies nausea. Ice pack in place over c/d/I Right shoulder surgical dressing.    1125- Discharge instructions read to the patient and family.     1137- PT at the bedside.     1147- Report to Valorie FIGUEROA.

## 2025-07-01 DIAGNOSIS — R39.12 BENIGN PROSTATIC HYPERPLASIA WITH WEAK URINARY STREAM: ICD-10-CM

## 2025-07-01 DIAGNOSIS — N40.1 BENIGN PROSTATIC HYPERPLASIA WITH WEAK URINARY STREAM: ICD-10-CM

## 2025-07-01 NOTE — TELEPHONE ENCOUNTER
Received request via: Pharmacy    Was the patient seen in the last year in this department? Yes    Does the patient have an active prescription (recently filled or refills available) for medication(s) requested? No    Pharmacy Name:     [facility] - [address] (Pharmacy) [phone]       Does the patient have [facility] and need 100-day supply? (This applies to ALL medications) Patient does not have SCP

## 2025-07-02 RX ORDER — TAMSULOSIN HYDROCHLORIDE 0.4 MG/1
0.4 CAPSULE ORAL
Qty: 90 CAPSULE | Refills: 3 | Status: SHIPPED | OUTPATIENT
Start: 2025-07-02

## 2025-07-08 ENCOUNTER — APPOINTMENT (OUTPATIENT)
Dept: MEDICAL GROUP | Facility: PHYSICIAN GROUP | Age: 60
End: 2025-07-08
Payer: COMMERCIAL

## 2025-07-08 VITALS
HEART RATE: 87 BPM | WEIGHT: 231 LBS | OXYGEN SATURATION: 96 % | BODY MASS INDEX: 35.01 KG/M2 | TEMPERATURE: 98.2 F | RESPIRATION RATE: 16 BRPM | SYSTOLIC BLOOD PRESSURE: 110 MMHG | HEIGHT: 68 IN | DIASTOLIC BLOOD PRESSURE: 64 MMHG

## 2025-07-08 DIAGNOSIS — R94.31 ABNORMAL EKG: Primary | ICD-10-CM

## 2025-07-08 DIAGNOSIS — Z96.611 STATUS POST REPLACEMENT OF RIGHT SHOULDER JOINT: ICD-10-CM

## 2025-07-08 DIAGNOSIS — Z13.228 SCREENING FOR ENDOCRINE, METABOLIC AND IMMUNITY DISORDER: ICD-10-CM

## 2025-07-08 DIAGNOSIS — I10 ESSENTIAL HYPERTENSION: Chronic | ICD-10-CM

## 2025-07-08 DIAGNOSIS — Z13.29 SCREENING FOR ENDOCRINE, METABOLIC AND IMMUNITY DISORDER: ICD-10-CM

## 2025-07-08 DIAGNOSIS — Z12.5 PROSTATE CANCER SCREENING: ICD-10-CM

## 2025-07-08 DIAGNOSIS — Z13.0 SCREENING FOR ENDOCRINE, METABOLIC AND IMMUNITY DISORDER: ICD-10-CM

## 2025-07-08 DIAGNOSIS — Z23 NEED FOR VACCINATION: ICD-10-CM

## 2025-07-08 PROCEDURE — 3074F SYST BP LT 130 MM HG: CPT | Performed by: PHYSICIAN ASSISTANT

## 2025-07-08 PROCEDURE — 3078F DIAST BP <80 MM HG: CPT | Performed by: PHYSICIAN ASSISTANT

## 2025-07-08 PROCEDURE — 90471 IMMUNIZATION ADMIN: CPT | Performed by: PHYSICIAN ASSISTANT

## 2025-07-08 PROCEDURE — 99214 OFFICE O/P EST MOD 30 MIN: CPT | Mod: 25 | Performed by: PHYSICIAN ASSISTANT

## 2025-07-08 PROCEDURE — 90677 PCV20 VACCINE IM: CPT | Performed by: PHYSICIAN ASSISTANT

## 2025-07-08 RX ORDER — OXYCODONE AND ACETAMINOPHEN 5; 325 MG/1; MG/1
TABLET ORAL
COMMUNITY
Start: 2025-05-28 | End: 2025-07-15

## 2025-07-08 ASSESSMENT — FIBROSIS 4 INDEX: FIB4 SCORE: 1.23

## 2025-07-08 NOTE — PROGRESS NOTES
"Verbal consent was acquired by the patient to use [employer] ambient listening note generation during this visit     Subjective:     HPI:   History of Present Illness  The patient, a 60-year-old male, presents for evaluation of hypertension.    He reports slightly hypotensive readings today without associated vertigo. Vertigo typically manifests with significant hypotensive episodes.    The patient underwent successful shoulder arthroplasty three weeks ago, has initiated physical therapy, and will continue to utilize a sling for an additional three weeks. He reports minimal postoperative pain, is able to perform hair washing, but experiences difficulty with posterior reaching. He plans to resume occupational duties on Monday with a restriction limiting lifting to 5 pounds.    An electrocardiogram (EKG) performed prior to surgery revealed abnormal R wave progression, which may be contributing to episodic vertigo. A cardiology referral is under consideration.    The patient expresses interest in pharmacological interventions for weight management.    PAST SURGICAL HISTORY:  Shoulder arthroplasty: 06/2025    Health Maintenance: Completed    ROS:  Gen: no fevers/chills  Eyes: no changes in vision  ENT: no sore throat  Pulm: no sob, no cough  CV: no chest pain  GI: no nausea/vomiting  : no dysuria  Skin: no rash  Neuro: no headaches    Objective:     Exam:  /64   Pulse 87   Temp 36.8 °C (98.2 °F) (Temporal)   Resp 16   Ht 1.727 m (5' 8\")   Wt 105 kg (231 lb)   SpO2 96%   BMI 35.12 kg/m²  Body mass index is 35.12 kg/m².    PHYSICAL EXAM  Constitutional: Alert, no distress, well-groomed.  Skin: Warm, dry, good turgor, no rashes in visible areas.  Eye: Equal, round and reactive, conjunctiva clear, lids normal.  ENMT: Lips without lesions, good dentition, moist mucous membranes.  Neck: Trachea midline, no masses, no thyromegaly.  Respiratory: Unlabored respiratory effort, no cough.  MSK: Normal gait, moves " all extremities.  Neuro: Grossly non-focal.   Psych: Alert and oriented x3, normal affect and mood.    Results  EKG reviewed     Assessment & Plan:     1. Abnormal EKG  REFERRAL TO CARDIOLOGY      2. Screening for endocrine, metabolic and immunity disorder  Comp Metabolic Panel    VITAMIN D,25 HYDROXY (DEFICIENCY)    Lipid Profile    TSH WITH REFLEX TO FT4    HEMOGLOBIN A1C    CBC WITHOUT DIFFERENTIAL      3. Prostate cancer screening  PROSTATE SPECIFIC AG SCREENING      4. Need for vaccination  Pneumococcal Conjugate Vaccine 20-Valent (6 wks+)        Assessment & Plan            1. Abnormal EKG (Primary)  Patient had a preop EKG completed that showed abnormal R wave progression which we discussed   - Referral to cardiology for further evaluation.  - Repeat EKG or cardiac monitor if frequent dizziness or SOB.  - REFERRAL TO CARDIOLOGY    2. Prostate cancer screening  - PROSTATE SPECIFIC AG SCREENING; Future    3. Need for vaccination  - Pneumococcal Conjugate Vaccine 20-Valent (6 wks+)    4. Screening for endocrine, metabolic and immunity disorder  - Comp Metabolic Panel; Future  - VITAMIN D,25 HYDROXY (DEFICIENCY); Future  - Lipid Profile; Future  - TSH WITH REFLEX TO FT4; Future  - HEMOGLOBIN A1C; Future  - CBC WITHOUT DIFFERENTIAL; Future    5. Status post replacement of right shoulder joint  Patient is approximately 3 weeks out from shoulder surgery and is recovering well.  Currently in a sling and doing physical therapy.  Follow-up with specialist as scheduled.    6. Essential hypertension  Chronic, stable.  Blood pressure in office today 110/64 but patient reports occasional dizziness.  Discussed possibly decreasing his medication, but he would like to continue with it for now and monitor his symptoms.      I spent a total of 32 minutes with record review, exam, communication with the patient, communication with other providers, and documentation of this encounter.    Please note that this dictation was  created using voice recognition software. I have made every reasonable attempt to correct obvious errors, but I expect that there are errors of grammar and possibly content that I did not discover before finalizing the note.

## 2025-07-10 ENCOUNTER — HOSPITAL ENCOUNTER (OUTPATIENT)
Dept: LAB | Facility: MEDICAL CENTER | Age: 60
End: 2025-07-10
Attending: PHYSICIAN ASSISTANT
Payer: COMMERCIAL

## 2025-07-10 DIAGNOSIS — Z13.228 SCREENING FOR ENDOCRINE, METABOLIC AND IMMUNITY DISORDER: ICD-10-CM

## 2025-07-10 DIAGNOSIS — Z13.0 SCREENING FOR ENDOCRINE, METABOLIC AND IMMUNITY DISORDER: ICD-10-CM

## 2025-07-10 DIAGNOSIS — Z13.29 SCREENING FOR ENDOCRINE, METABOLIC AND IMMUNITY DISORDER: ICD-10-CM

## 2025-07-10 DIAGNOSIS — Z12.5 PROSTATE CANCER SCREENING: ICD-10-CM

## 2025-07-10 LAB
25(OH)D3 SERPL-MCNC: 48 NG/ML (ref 30–100)
ALBUMIN SERPL BCP-MCNC: 4.4 G/DL (ref 3.2–4.9)
ALBUMIN/GLOB SERPL: 1.4 G/DL
ALP SERPL-CCNC: 86 U/L (ref 30–99)
ALT SERPL-CCNC: 24 U/L (ref 2–50)
ANION GAP SERPL CALC-SCNC: 13 MMOL/L (ref 7–16)
AST SERPL-CCNC: 26 U/L (ref 12–45)
BILIRUB SERPL-MCNC: 0.9 MG/DL (ref 0.1–1.5)
BUN SERPL-MCNC: 13 MG/DL (ref 8–22)
CALCIUM ALBUM COR SERPL-MCNC: 9.5 MG/DL (ref 8.5–10.5)
CALCIUM SERPL-MCNC: 9.8 MG/DL (ref 8.5–10.5)
CHLORIDE SERPL-SCNC: 103 MMOL/L (ref 96–112)
CHOLEST SERPL-MCNC: 120 MG/DL (ref 100–199)
CO2 SERPL-SCNC: 23 MMOL/L (ref 20–33)
CREAT SERPL-MCNC: 1.1 MG/DL (ref 0.5–1.4)
ERYTHROCYTE [DISTWIDTH] IN BLOOD BY AUTOMATED COUNT: 41.1 FL (ref 35.9–50)
EST. AVERAGE GLUCOSE BLD GHB EST-MCNC: 120 MG/DL
FASTING STATUS PATIENT QL REPORTED: NORMAL
GFR SERPLBLD CREATININE-BSD FMLA CKD-EPI: 76 ML/MIN/1.73 M 2
GLOBULIN SER CALC-MCNC: 3.2 G/DL (ref 1.9–3.5)
GLUCOSE SERPL-MCNC: 112 MG/DL (ref 65–99)
HBA1C MFR BLD: 5.8 % (ref 4–5.6)
HCT VFR BLD AUTO: 43.4 % (ref 42–52)
HDLC SERPL-MCNC: 58 MG/DL
HGB BLD-MCNC: 14.5 G/DL (ref 14–18)
LDLC SERPL CALC-MCNC: 50 MG/DL
MCH RBC QN AUTO: 28.8 PG (ref 27–33)
MCHC RBC AUTO-ENTMCNC: 33.4 G/DL (ref 32.3–36.5)
MCV RBC AUTO: 86.1 FL (ref 81.4–97.8)
PLATELET # BLD AUTO: 273 K/UL (ref 164–446)
PMV BLD AUTO: 9.7 FL (ref 9–12.9)
POTASSIUM SERPL-SCNC: 4.7 MMOL/L (ref 3.6–5.5)
PROT SERPL-MCNC: 7.6 G/DL (ref 6–8.2)
PSA SERPL DL<=0.01 NG/ML-MCNC: 2.82 NG/ML (ref 0–4)
RBC # BLD AUTO: 5.04 M/UL (ref 4.7–6.1)
SODIUM SERPL-SCNC: 139 MMOL/L (ref 135–145)
TRIGL SERPL-MCNC: 60 MG/DL (ref 0–149)
TSH SERPL DL<=0.005 MIU/L-ACNC: 1.15 UIU/ML (ref 0.38–5.33)
WBC # BLD AUTO: 7.1 K/UL (ref 4.8–10.8)

## 2025-07-10 PROCEDURE — 83036 HEMOGLOBIN GLYCOSYLATED A1C: CPT

## 2025-07-10 PROCEDURE — 82306 VITAMIN D 25 HYDROXY: CPT

## 2025-07-10 PROCEDURE — 80061 LIPID PANEL: CPT

## 2025-07-10 PROCEDURE — 36415 COLL VENOUS BLD VENIPUNCTURE: CPT

## 2025-07-10 PROCEDURE — 80053 COMPREHEN METABOLIC PANEL: CPT

## 2025-07-10 PROCEDURE — 84153 ASSAY OF PSA TOTAL: CPT

## 2025-07-10 PROCEDURE — 85027 COMPLETE CBC AUTOMATED: CPT

## 2025-07-10 PROCEDURE — 84443 ASSAY THYROID STIM HORMONE: CPT

## 2025-07-15 ENCOUNTER — OFFICE VISIT (OUTPATIENT)
Dept: CARDIOLOGY | Facility: MEDICAL CENTER | Age: 60
End: 2025-07-15
Attending: PHYSICIAN ASSISTANT
Payer: COMMERCIAL

## 2025-07-15 VITALS
HEART RATE: 86 BPM | HEIGHT: 68 IN | DIASTOLIC BLOOD PRESSURE: 70 MMHG | BODY MASS INDEX: 34.71 KG/M2 | RESPIRATION RATE: 18 BRPM | SYSTOLIC BLOOD PRESSURE: 114 MMHG | WEIGHT: 229 LBS | OXYGEN SATURATION: 97 %

## 2025-07-15 DIAGNOSIS — R94.31 NONSPECIFIC ABNORMAL ELECTROCARDIOGRAM (ECG) (EKG): Primary | ICD-10-CM

## 2025-07-15 DIAGNOSIS — I10 ESSENTIAL HYPERTENSION: Chronic | ICD-10-CM

## 2025-07-15 PROCEDURE — 3074F SYST BP LT 130 MM HG: CPT | Performed by: INTERNAL MEDICINE

## 2025-07-15 PROCEDURE — 99204 OFFICE O/P NEW MOD 45 MIN: CPT | Performed by: INTERNAL MEDICINE

## 2025-07-15 PROCEDURE — 99212 OFFICE O/P EST SF 10 MIN: CPT | Performed by: INTERNAL MEDICINE

## 2025-07-15 PROCEDURE — 3078F DIAST BP <80 MM HG: CPT | Performed by: INTERNAL MEDICINE

## 2025-07-15 ASSESSMENT — FIBROSIS 4 INDEX: FIB4 SCORE: 1.21

## 2025-07-16 ENCOUNTER — TELEPHONE (OUTPATIENT)
Dept: CARDIOLOGY | Facility: MEDICAL CENTER | Age: 60
End: 2025-07-16
Payer: COMMERCIAL

## 2025-07-16 NOTE — PROGRESS NOTES
"INTERVENTIONAL CARDIOLOGY NEW PATIENT CONSULTATION    PCP: Eliza Cool P.A.-C.    1. Nonspecific abnormal electrocardiogram (ECG) (EKG)    2. Essential hypertension        Lawrence Garrett has early R wave transition on ECG of no clear clinical significance.  He does not meet any other criteria for posterior infarct.  Accordingly I believe this to be a normal variation and no additional follow-up was recommended.      He does have lightheadedness which may be a sign of blood pressure medication access.  He will track the blood pressure systematically over the next 2 weeks and we will check in on him and adjust medication accordingly.    Follow up: as needed      History: Lawrence Garrett is a 60 y.o. male with history of hypertension presenting for assessment of abnormal ECG which was obtained as part of preshoulder surgery assessment.     He feels well aside from episodic dizziness.  Has a blood pressure log from these dates and notes a trend towards lower readings.      PE:  /70 (BP Location: Left arm, Patient Position: Sitting, BP Cuff Size: Adult)   Pulse 86   Resp 18   Ht 1.727 m (5' 8\")   Wt 104 kg (229 lb)   SpO2 97%   BMI 34.82 kg/m²   GEN: NAD  RESP: CTAB  CVS: RRR, No M/R/G  ABD: Soft, NT/ND  EXT: WWP, no edema    Studies interpreted by me: ECG: Sinus rhythm with early precordial R//transition    The ASCVD Risk score (Xenia DK, et al., 2019) failed to calculate.    Studies  Lab Results   Component Value Date/Time    CHOLSTRLTOT 120 07/10/2025 06:23 AM    LDL 50 07/10/2025 06:23 AM    HDL 58 07/10/2025 06:23 AM    TRIGLYCERIDE 60 07/10/2025 06:23 AM       Lab Results   Component Value Date/Time    SODIUM 139 07/10/2025 06:23 AM    POTASSIUM 4.7 07/10/2025 06:23 AM    CHLORIDE 103 07/10/2025 06:23 AM    CO2 23 07/10/2025 06:23 AM    GLUCOSE 112 (H) 07/10/2025 06:23 AM    BUN 13 07/10/2025 06:23 AM    CREATININE 1.10 07/10/2025 06:23 AM    CREATININE 1.09 01/03/2011 08:40 AM    " "BUNCREATRAT 15 02/22/2024 07:00 AM    BUNCREATRAT 15 01/03/2011 08:40 AM    GLOMRATE >59 01/03/2011 08:40 AM      No results found for: \"PROTHROMBTM\", \"INR\"   Lab Results   Component Value Date/Time    WBC 7.1 07/10/2025 06:23 AM    RBC 5.04 07/10/2025 06:23 AM    HEMOGLOBIN 14.5 07/10/2025 06:23 AM    HEMATOCRIT 43.4 07/10/2025 06:23 AM    MCV 86.1 07/10/2025 06:23 AM    MCH 28.8 07/10/2025 06:23 AM    MCHC 33.4 07/10/2025 06:23 AM    MPV 9.7 07/10/2025 06:23 AM    NEUTSPOLYS 49 10/26/2021 10:44 AM    NEUTSPOLYS 51.90 04/23/2019 07:51 AM    LYMPHOCYTES 36 10/26/2021 10:44 AM    LYMPHOCYTES 34.00 04/23/2019 07:51 AM    MONOCYTES 11 10/26/2021 10:44 AM    MONOCYTES 11.00 04/23/2019 07:51 AM    EOSINOPHILS 3 10/26/2021 10:44 AM    EOSINOPHILS 2.60 04/23/2019 07:51 AM    BASOPHILS 1 10/26/2021 10:44 AM    BASOPHILS 0.50 04/23/2019 07:51 AM    HYPOCHROMIA 1+ 07/11/2012 07:02 AM        Past Medical History[1]  Past Surgical History[2]  Allergies[3]  Encounter Medications[4]  Social History     Socioeconomic History    Marital status:      Spouse name: Not on file    Number of children: Not on file    Years of education: Not on file    Highest education level: Master's degree (e.g., MA, MS, [place], MEd, MSW, [employer])   Occupational History    Not on file   Tobacco Use    Smoking status: Never    Smokeless tobacco: Never   Vaping Use    Vaping status: Never Used   Substance and Sexual Activity    Alcohol use: Yes     Alcohol/week: 1.2 oz     Types: 2 Shots of liquor per week    Drug use: No    Sexual activity: Yes     Partners: Female   Other Topics Concern    Not on file   Social History Narrative    Not on file     Social Drivers of Health     Financial Resource Strain: Medium Risk (10/21/2022)    Overall Financial Resource Strain (CARDIA)     Difficulty of Paying Living Expenses: Somewhat hard   Food Insecurity: No Food Insecurity (10/21/2022)    Hunger Vital Sign     Worried About Running Out of Food in the Last Year: " "Never true     Ran Out of Food in the Last Year: Never true   Transportation Needs: No Transportation Needs (10/21/2022)    PRAPARE - Transportation     Lack of Transportation (Medical): No     Lack of Transportation (Non-Medical): No   Physical Activity: Sufficiently Active (10/21/2022)    Exercise Vital Sign     Days of Exercise per Week: 7 days     Minutes of Exercise per Session: 60 min   Stress: No Stress Concern Present (10/21/2022)    [employer] - Occupational Stress Questionnaire     Feeling of Stress : Not at all   Social Connections: Moderately Integrated (10/21/2022)    Social Connection and Isolation Panel [NHANES]     Frequency of Communication with Friends and Family: Once a week     Frequency of Social Gatherings with Friends and Family: Once a week     Attends Zoroastrian Services: More than 4 times per year     Active Member of Clubs or Organizations: Yes     Attends Club or Organization Meetings: More than 4 times per year     Marital Status:    Intimate Partner Violence: Not on file   Housing Stability: Low Risk  (10/21/2022)    Housing Stability Vital Sign     Unable to Pay for Housing in the Last Year: No     Number of Places Lived in the Last Year: 1     Unstable Housing in the Last Year: No     Family History   Problem Relation Age of Onset    Heart Disease Father 70               Chief Complaint   Patient presents with    New Patient     Abnormal EKG       ROS:   10 point review systems is otherwise negative except as per the HPI       [1]   Past Medical History:  Diagnosis Date    Arrhythmia     \"extra beat\", holter monitor and cardiologist, no diagnosis    Dyslipidemia 2010    Elevated liver function tests 2010    Fatty liver     Hiatus hernia syndrome  or     Found on CT with [place] DX.    High cholesterol     HTN (hypertension) 2010    Hyperlipidemia     Hypertension     Infectious disease     chickenpox    Psychiatric " problem     Anxiety    Seizure (HCC) 1990    one time only, not taking any meds   [2]   Past Surgical History:  Procedure Laterality Date    PB RECONSTR TOTAL SHOULDER IMPLANT Right 6/20/2025    Procedure: RIGHT TOTAL SHOULDER ARTHROPLASTY;  Surgeon: Dodie Huang M.D.;  Location: SURGERY [facility];  Service: Orthopedics    MS LDR ARTHROSCOP,PART ACROMIOPLAS Right 3/8/2022    Procedure: DECOMPRESSION, SHOULDER, ARTHROSCOPIC - SUBACROMIAL;  Surgeon: Dodie Huang M.D.;  Location: SURGERY [facility];  Service: Orthopedics    MS SHLDR ARTHROSCOP PART DEBRIDE 1-2 Right 3/8/2022    Procedure: DEBRIDEMENT,SHOULDER,LIMITED,ARTHROSCOPIC - LABRAL;  Surgeon: Dodie Huang M.D.;  Location: SURGERY [facility];  Service: Orthopedics    PB ARTHROSCOPY SHOULDER SURGICAL BICEPS TENODES* Right 3/8/2022    Procedure: ARTHROSCOPY, SHOULDER, WITH BICEPS TENODESIS - PROXIMAL;  Surgeon: Dodie Huang M.D.;  Location: SURGERY [facility];  Service: Orthopedics    CHONDROPLASTY Right 3/8/2022    Procedure: CHONDROPLASTY;  Surgeon: Dodie Huang M.D.;  Location: SURGERY [facility];  Service: Orthopedics    OTHER ORTHOPEDIC SURGERY Left 05/2020    RCR    VASECTOMY     [3] No Known Allergies  [4]   Outpatient Encounter Medications as of 7/15/2025   Medication Sig Dispense Refill    tamsulosin (FLOMAX) 0.4 MG capsule TAKE 1 CAPSULE 1/2 HOUR AFTER BREAKFAST 90 Capsule 3    Multiple Vitamins-Minerals (MULTIVITAMIN MEN PO) Take 1 Tablet by mouth every day.      fluticasone (FLONASE) 50 MCG/ACT nasal spray Administer 1 Spray into affected nostril(S) every day.      Xylitol 500 MG DISK Use  in the mouth or throat. (Patient taking differently: Use  in the mouth or throat as needed.)      fenofibrate (TRICOR) 145 MG Tab TAKE 1 TABLET DAILY 90 Tablet 3    ibuprofen (MOTRIN) 800 MG Tab Take 1 Tablet by mouth every 8 hours as needed for Moderate Pain. AS NEEDED FOR MILD PAIN. 90 Tablet 1     olmesartan-hydrochlorothiazide (BENICAR HCT) 40-12.5 MG per tablet Take 1 Tablet by mouth every day. 100 Tablet 1    atorvastatin (LIPITOR) 40 MG Tab TAKE 1 TABLET DAILY 90 Tablet 3    amLODIPine (NORVASC) 10 MG Tab TAKE 1 TABLET DAILY (Patient taking differently: Take 10 mg by mouth every morning.) 90 Tablet 3    Omega-3 Fatty Acids (FISH OIL PO) Take 1,000 mg by mouth every day.      aspirin (ASA) 325 MG TABS Take 325 mg by mouth every day.      vitamin D (CHOLECALCIFEROL) 1000 UNIT TABS Take 6,000 Units by mouth every day.      [DISCONTINUED] oxyCODONE-acetaminophen (PERCOCET) 5-325 MG Tab TAKE 1 TABLET BY MOUTH EVERY 4 TO 6 HOURS AS NEEDED FOR 7 DAYS (Patient not taking: Reported on 7/8/2025)      [DISCONTINUED] ketoconazole (NIZORAL) 2 % Cream APPLY 0.5 G TO AFFECTED AREAS TWICE DAILY AS NEEDED FOR RASH (Patient taking differently: as needed. APPLY 0.5 G TO AFFECTED AREAS TWICE DAILY AS NEEDED FOR RASH) 60 g 0     No facility-administered encounter medications on file as of 7/15/2025.

## 2025-07-16 NOTE — TELEPHONE ENCOUNTER
----- Message from Physician Kendrick Foster M.D. sent at 7/15/2025  5:03 PM PDT -----  Can you check on patients BP in 2 weeks. If <120 can cut amlodipine to 5 mg daily    Thx  BE

## 2025-07-30 NOTE — TELEPHONE ENCOUNTER
Phone Number Called: [phone]      Call outcome: Did not leave a detailed message. Requested patient to call back.    Message: Called to request updated blood pressure measurements. Will send [employer].

## 2025-08-05 ENCOUNTER — PATIENT MESSAGE (OUTPATIENT)
Dept: CARDIOLOGY | Facility: MEDICAL CENTER | Age: 60
End: 2025-08-05
Payer: COMMERCIAL

## 2025-08-05 DIAGNOSIS — I10 ESSENTIAL HYPERTENSION: ICD-10-CM

## 2025-08-05 RX ORDER — AMLODIPINE BESYLATE 5 MG/1
5 TABLET ORAL DAILY
Qty: 100 TABLET | Refills: 3 | Status: SHIPPED | OUTPATIENT
Start: 2025-08-05

## 2025-08-12 ENCOUNTER — PATIENT MESSAGE (OUTPATIENT)
Dept: CARDIOLOGY | Facility: MEDICAL CENTER | Age: 60
End: 2025-08-12
Payer: COMMERCIAL

## 2025-08-12 VITALS — DIASTOLIC BLOOD PRESSURE: 81 MMHG | SYSTOLIC BLOOD PRESSURE: 122 MMHG

## (undated) DEVICE — HEAD HOLDER JUNIOR/ADULT

## (undated) DEVICE — KIT ANESTHESIA W/CIRCUIT & 3/LT BAG W/FILTER (20EA/CA)

## (undated) DEVICE — HUMID-VENT HEAT AND MOISTURE EXCHANGE- (50/BX)

## (undated) DEVICE — SENSOR OXIMETER ADULT SPO2 RD SET (20EA/BX)

## (undated) DEVICE — NEEDLE MAYO CATGUT TPR POINT - (2EA/PK20PK/BX)

## (undated) DEVICE — Device

## (undated) DEVICE — SUTURE GENERAL

## (undated) DEVICE — PROTECTOR ULNA NERVE - (36PR/CA)

## (undated) DEVICE — BLADE SAGITTAL SAW DUAL CUT 75.0 X 25.0MM (1/EA)

## (undated) DEVICE — SUCTION INSTRUMENT YANKAUER BULBOUS TIP W/O VENT (50EA/CA)

## (undated) DEVICE — GLOVE 7.0 LF PF PROTEXIS (50PR/BX)

## (undated) DEVICE — GOWN WARMING STANDARD FLEX - (30/CA)

## (undated) DEVICE — SODIUM CHL. IRRIGATION 0.9% 3000ML (4EA/CA 65CA/PF)

## (undated) DEVICE — DEVICE MONOPOLAR RF PEAK PLASMABLADE 3.0S

## (undated) DEVICE — DRAPE LARGE 3 QUARTER - (20/CA)

## (undated) DEVICE — SYSTEM WOUND IRRIGATION IRRISEPT GLUCONATE DEBRIDEMENT CLEANSING

## (undated) DEVICE — ADHESIVE MASTISOL - (48/BX)

## (undated) DEVICE — SODIUM CHL IRRIGATION 0.9% 1000ML (12EA/CA)

## (undated) DEVICE — NEEDLE W/FACET TIP DULL VERSION W/STIMULATION CABLE SONOPLEX 21G X 4 (10/EA)"

## (undated) DEVICE — BAG SPONGE COUNT 10.25 X 32 - BLUE (250/CA)

## (undated) DEVICE — GOWN SURGICAL X-LARGE ULTRA - FILM-REINFORCED (20/CA)

## (undated) DEVICE — SUTURE 3-0 ETHILON FS-1 - (36/BX) 30 INCH

## (undated) DEVICE — SHAVER, 5.5 RESECTOR

## (undated) DEVICE — HANDPIECE 10FT INTPLS SCT PLS IRRIGATION HAND CONTROL SET (6/PK)

## (undated) DEVICE — PADDING CAST 6 IN STERILE - 6 X 4 YDS (24/CA)

## (undated) DEVICE — GLOVE SURGICAL PROTEXIS PI 8.0 LF - (50PR/BX)

## (undated) DEVICE — PACK TOTAL HIP - (2EA/CA)

## (undated) DEVICE — GLOVE BIOGEL SZ 7 SURGICAL PF LTX - (50PR/BX 4BX/CA)

## (undated) DEVICE — ABLATOR WAND SERFAS 90-S CRUISE

## (undated) DEVICE — CANISTER SUCTION RIGID RED 1500CC (40EA/CA)

## (undated) DEVICE — ELECTRODE DUAL RETURN W/ CORD - (50/PK)

## (undated) DEVICE — PACK KNEE ARTHROSCOPY SM OR - (2EA/CA)

## (undated) DEVICE — DRAPE IOBAN II INCISE 23X17 - (10EA/BX 4BX/CA)

## (undated) DEVICE — LACTATED RINGERS INJ 1000 ML - (14EA/CA 60CA/PF)

## (undated) DEVICE — TOWEL STOP TIMEOUT SAFETY FLAG (40EA/CA)

## (undated) DEVICE — DRAPE SHOULDER FLUID CONTROL - 77 X 85 (10/CA)

## (undated) DEVICE — GLOVE BIOGEL INDICATOR SZ 6.5 SURGICAL PF LTX - (50PR/BX 4BX/CA)

## (undated) DEVICE — STOCKINETTE, TUBULAR 6

## (undated) DEVICE — SYRINGE ASEPTO - (50EA/CA

## (undated) DEVICE — COVER MAYO STAND X-LG - (22EA/CA)

## (undated) DEVICE — SPIDER SHOULDER HOLDER (12EA/BX)

## (undated) DEVICE — AQUACEL 4X4

## (undated) DEVICE — SUTURE 0 VICRYL PLUS CT-1 - 8 X 18 INCH (12/BX)

## (undated) DEVICE — DRAPETIBURON SHOULDER W/POUCH - (5EA/CA)

## (undated) DEVICE — MASK ANESTHESIA ADULT  - (100/CA)

## (undated) DEVICE — TUBE CONNECTING SUCTION - CLEAR PLASTIC STERILE 72 IN (50EA/CA)

## (undated) DEVICE — GLOVE SZ 6.5 BIOGEL PI MICRO - PF LF (50PR/BX)

## (undated) DEVICE — SUTURE 3-0 MONOCRYL PLUS PS-2 - (12/BX)

## (undated) DEVICE — CLOSURE SKIN STRIP 1/2 X 4 IN - (STERI STRIP) (50/BX 4BX/CA)

## (undated) DEVICE — TIP INTPLS HFLO ML ORFC BTRY - (12/CS) FOR SURGILAV

## (undated) DEVICE — SUTURE NONABSORBABLE XBRAID #2 26MM (12EA/BX)

## (undated) DEVICE — SENSOR SPO2 NEO LNCS ADHESIVE (20/BX) SEE USER NOTES

## (undated) DEVICE — SHAVER4.0 AGGRESSIVE + FORMLA (5EA/BX)

## (undated) DEVICE — CHLORAPREP 26 ML APPLICATOR - ORANGE TINT(25/CA)

## (undated) DEVICE — CANNULA THREADED 5X75 (5EA/BX)

## (undated) DEVICE — WATER IRRIGATION STERILE 1000ML (12EA/CA)

## (undated) DEVICE — TUBING PATIENT W/CONNECTOR REDEUCE (1EA)

## (undated) DEVICE — TUBING PUMP WITH CONNECTOR REDEUCE (1EA)

## (undated) DEVICE — CANNULA FULLY THREADED 8 X 75 (5EA/BX)

## (undated) DEVICE — SLEEVE SHOULDER DISP(ARTHREX) - (6/BX)

## (undated) DEVICE — SUTURE 2-0 MONOCRYL PLUS UNDYED CT-1 1 X 36 (36EA/BX)"

## (undated) DEVICE — GLOVE, LITE (PAIR)

## (undated) DEVICE — GLOVE BIOGEL INDICATOR SZ 8 SURGICAL PF LTX - (50/BX 4BX/CA)

## (undated) DEVICE — DRAPE SURG STERI-DRAPE 7X11OD - (10EA/BX, 40EA/CA)

## (undated) DEVICE — NEPTUNE 4 PORT MANIFOLD - (20/PK)

## (undated) DEVICE — DRAPE U SPLIT IMP 54 X 76 - (24/CA)

## (undated) DEVICE — TUBE E-T HI-LO CUFF 7.0MM (10EA/PK)

## (undated) DEVICE — ELECTRODE 850 FOAM ADHESIVE - HYDROGEL RADIOTRNSPRNT (50/PK)